# Patient Record
Sex: MALE | Race: WHITE | Employment: OTHER | URBAN - METROPOLITAN AREA
[De-identification: names, ages, dates, MRNs, and addresses within clinical notes are randomized per-mention and may not be internally consistent; named-entity substitution may affect disease eponyms.]

---

## 2017-01-24 ENCOUNTER — OFFICE VISIT (OUTPATIENT)
Dept: CARDIOLOGY | Age: 72
End: 2017-01-24

## 2017-01-24 VITALS
HEIGHT: 64 IN | RESPIRATION RATE: 12 BRPM | BODY MASS INDEX: 38.58 KG/M2 | WEIGHT: 226 LBS | HEART RATE: 56 BPM | SYSTOLIC BLOOD PRESSURE: 134 MMHG | DIASTOLIC BLOOD PRESSURE: 84 MMHG | OXYGEN SATURATION: 93 %

## 2017-01-24 DIAGNOSIS — I10 ESSENTIAL HYPERTENSION: ICD-10-CM

## 2017-01-24 DIAGNOSIS — E78.5 HYPERLIPIDEMIA, UNSPECIFIED HYPERLIPIDEMIA TYPE: Primary | ICD-10-CM

## 2017-01-24 DIAGNOSIS — N20.0 KIDNEY STONES: ICD-10-CM

## 2017-01-24 PROCEDURE — G8427 DOCREV CUR MEDS BY ELIG CLIN: HCPCS | Performed by: INTERNAL MEDICINE

## 2017-01-24 PROCEDURE — 1036F TOBACCO NON-USER: CPT | Performed by: INTERNAL MEDICINE

## 2017-01-24 PROCEDURE — G8419 CALC BMI OUT NRM PARAM NOF/U: HCPCS | Performed by: INTERNAL MEDICINE

## 2017-01-24 PROCEDURE — 99213 OFFICE O/P EST LOW 20 MIN: CPT | Performed by: INTERNAL MEDICINE

## 2017-01-24 PROCEDURE — G8484 FLU IMMUNIZE NO ADMIN: HCPCS | Performed by: INTERNAL MEDICINE

## 2017-01-24 PROCEDURE — 3017F COLORECTAL CA SCREEN DOC REV: CPT | Performed by: INTERNAL MEDICINE

## 2017-01-24 PROCEDURE — 1123F ACP DISCUSS/DSCN MKR DOCD: CPT | Performed by: INTERNAL MEDICINE

## 2017-01-24 PROCEDURE — 4040F PNEUMOC VAC/ADMIN/RCVD: CPT | Performed by: INTERNAL MEDICINE

## 2017-01-24 RX ORDER — IMIQUIMOD 12.5 MG/.25G
CREAM TOPICAL
Refills: 0 | COMMUNITY
Start: 2017-01-09 | End: 2017-09-05

## 2017-01-24 ASSESSMENT — ENCOUNTER SYMPTOMS
SHORTNESS OF BREATH: 0
APNEA: 0
COUGH: 0
CHEST TIGHTNESS: 0
COLOR CHANGE: 0

## 2017-03-08 ENCOUNTER — OFFICE VISIT (OUTPATIENT)
Dept: FAMILY MEDICINE CLINIC | Age: 72
End: 2017-03-08

## 2017-03-08 VITALS
OXYGEN SATURATION: 96 % | BODY MASS INDEX: 37.9 KG/M2 | SYSTOLIC BLOOD PRESSURE: 134 MMHG | HEART RATE: 53 BPM | WEIGHT: 222 LBS | HEIGHT: 64 IN | DIASTOLIC BLOOD PRESSURE: 80 MMHG

## 2017-03-08 DIAGNOSIS — N40.0 BENIGN NON-NODULAR PROSTATIC HYPERPLASIA WITHOUT LOWER URINARY TRACT SYMPTOMS: ICD-10-CM

## 2017-03-08 DIAGNOSIS — R06.09 DOE (DYSPNEA ON EXERTION): ICD-10-CM

## 2017-03-08 DIAGNOSIS — M19.90 OSTEOARTHRITIS, UNSPECIFIED OSTEOARTHRITIS TYPE, UNSPECIFIED SITE: ICD-10-CM

## 2017-03-08 DIAGNOSIS — K21.9 GASTROESOPHAGEAL REFLUX DISEASE WITHOUT ESOPHAGITIS: Primary | ICD-10-CM

## 2017-03-08 DIAGNOSIS — Z11.59 NEED FOR HEPATITIS C SCREENING TEST: ICD-10-CM

## 2017-03-08 DIAGNOSIS — G47.33 OSA (OBSTRUCTIVE SLEEP APNEA): ICD-10-CM

## 2017-03-08 DIAGNOSIS — E78.5 HYPERLIPIDEMIA, UNSPECIFIED HYPERLIPIDEMIA TYPE: ICD-10-CM

## 2017-03-08 DIAGNOSIS — I10 ESSENTIAL HYPERTENSION: ICD-10-CM

## 2017-03-08 DIAGNOSIS — R00.2 PALPITATIONS: ICD-10-CM

## 2017-03-08 DIAGNOSIS — C44.91 MALIGNANT BASAL CELL NEOPLASM OF SKIN: ICD-10-CM

## 2017-03-08 LAB
ALBUMIN SERPL-MCNC: 3.6 G/DL (ref 3.9–4.9)
ALP BLD-CCNC: 82 U/L (ref 35–104)
ALT SERPL-CCNC: 29 U/L (ref 0–41)
ANION GAP SERPL CALCULATED.3IONS-SCNC: 11 MEQ/L (ref 7–13)
AST SERPL-CCNC: 26 U/L (ref 0–40)
BILIRUB SERPL-MCNC: 0.4 MG/DL (ref 0–1.2)
BUN BLDV-MCNC: 16 MG/DL (ref 8–23)
CALCIUM SERPL-MCNC: 8.6 MG/DL (ref 8.6–10.2)
CHLORIDE BLD-SCNC: 105 MEQ/L (ref 98–107)
CHOLESTEROL, TOTAL: 110 MG/DL (ref 0–199)
CO2: 24 MEQ/L (ref 22–29)
CREAT SERPL-MCNC: 1.14 MG/DL (ref 0.7–1.2)
GFR AFRICAN AMERICAN: >60
GFR NON-AFRICAN AMERICAN: >60
GLOBULIN: 2.6 G/DL (ref 2.3–3.5)
GLUCOSE BLD-MCNC: 102 MG/DL (ref 74–109)
HCT VFR BLD CALC: 45.1 % (ref 42–52)
HDLC SERPL-MCNC: 38 MG/DL (ref 40–59)
HEMOGLOBIN: 15.2 G/DL (ref 14–18)
LDL CHOLESTEROL CALCULATED: 54 MG/DL (ref 0–129)
MCH RBC QN AUTO: 30.8 PG (ref 27–31.3)
MCHC RBC AUTO-ENTMCNC: 33.7 % (ref 33–37)
MCV RBC AUTO: 91.5 FL (ref 80–100)
PDW BLD-RTO: 13.4 % (ref 11.5–14.5)
PLATELET # BLD: 178 K/UL (ref 130–400)
POTASSIUM SERPL-SCNC: 4 MEQ/L (ref 3.5–5.1)
RBC # BLD: 4.93 M/UL (ref 4.7–6.1)
SODIUM BLD-SCNC: 140 MEQ/L (ref 132–144)
TOTAL PROTEIN: 6.2 G/DL (ref 6.4–8.1)
TRIGL SERPL-MCNC: 89 MG/DL (ref 0–200)
WBC # BLD: 6.8 K/UL (ref 4.8–10.8)

## 2017-03-08 PROCEDURE — 3017F COLORECTAL CA SCREEN DOC REV: CPT | Performed by: FAMILY MEDICINE

## 2017-03-08 PROCEDURE — G8427 DOCREV CUR MEDS BY ELIG CLIN: HCPCS | Performed by: FAMILY MEDICINE

## 2017-03-08 PROCEDURE — 1036F TOBACCO NON-USER: CPT | Performed by: FAMILY MEDICINE

## 2017-03-08 PROCEDURE — G8419 CALC BMI OUT NRM PARAM NOF/U: HCPCS | Performed by: FAMILY MEDICINE

## 2017-03-08 PROCEDURE — 99214 OFFICE O/P EST MOD 30 MIN: CPT | Performed by: FAMILY MEDICINE

## 2017-03-08 PROCEDURE — 4040F PNEUMOC VAC/ADMIN/RCVD: CPT | Performed by: FAMILY MEDICINE

## 2017-03-08 PROCEDURE — G8484 FLU IMMUNIZE NO ADMIN: HCPCS | Performed by: FAMILY MEDICINE

## 2017-03-08 PROCEDURE — 1123F ACP DISCUSS/DSCN MKR DOCD: CPT | Performed by: FAMILY MEDICINE

## 2017-03-11 LAB — HEPATITIS C ANTIBODY INTERPRETATION: NORMAL

## 2017-03-14 RX ORDER — ALLOPURINOL 100 MG/1
TABLET ORAL
Qty: 30 TABLET | Refills: 5 | Status: SHIPPED | OUTPATIENT
Start: 2017-03-14 | End: 2017-09-04 | Stop reason: SDUPTHER

## 2017-04-06 RX ORDER — FINASTERIDE 5 MG/1
TABLET, FILM COATED ORAL
Qty: 90 TABLET | Refills: 2 | Status: SHIPPED | OUTPATIENT
Start: 2017-04-06 | End: 2017-12-03 | Stop reason: SDUPTHER

## 2017-04-06 RX ORDER — TERAZOSIN 10 MG/1
CAPSULE ORAL
Qty: 90 CAPSULE | Refills: 2 | Status: SHIPPED | OUTPATIENT
Start: 2017-04-06 | End: 2017-12-03 | Stop reason: SDUPTHER

## 2017-06-12 RX ORDER — METOPROLOL TARTRATE 50 MG/1
TABLET, FILM COATED ORAL
Qty: 60 TABLET | Refills: 5 | Status: SHIPPED | OUTPATIENT
Start: 2017-06-12 | End: 2017-12-03 | Stop reason: SDUPTHER

## 2017-06-12 RX ORDER — HYDROCHLOROTHIAZIDE 25 MG/1
TABLET ORAL
Qty: 30 TABLET | Refills: 5 | Status: SHIPPED | OUTPATIENT
Start: 2017-06-12 | End: 2017-12-03 | Stop reason: SDUPTHER

## 2017-07-10 RX ORDER — EZETIMIBE AND SIMVASTATIN 10; 20 MG/1; MG/1
TABLET ORAL
Qty: 30 TABLET | Refills: 5 | Status: CANCELLED | OUTPATIENT
Start: 2017-07-10

## 2017-07-10 RX ORDER — EZETIMIBE AND SIMVASTATIN 10; 20 MG/1; MG/1
TABLET ORAL
Qty: 30 TABLET | Refills: 5 | Status: SHIPPED | OUTPATIENT
Start: 2017-07-10 | End: 2017-12-03 | Stop reason: SDUPTHER

## 2017-07-25 ENCOUNTER — OFFICE VISIT (OUTPATIENT)
Dept: CARDIOLOGY | Age: 72
End: 2017-07-25

## 2017-07-25 VITALS
HEIGHT: 64 IN | OXYGEN SATURATION: 98 % | DIASTOLIC BLOOD PRESSURE: 82 MMHG | RESPIRATION RATE: 12 BRPM | SYSTOLIC BLOOD PRESSURE: 138 MMHG | TEMPERATURE: 96.9 F | BODY MASS INDEX: 39.44 KG/M2 | WEIGHT: 231 LBS | HEART RATE: 56 BPM

## 2017-07-25 DIAGNOSIS — G47.33 OSA (OBSTRUCTIVE SLEEP APNEA): ICD-10-CM

## 2017-07-25 DIAGNOSIS — E78.5 HYPERLIPIDEMIA, UNSPECIFIED HYPERLIPIDEMIA TYPE: ICD-10-CM

## 2017-07-25 DIAGNOSIS — N40.0 BENIGN NON-NODULAR PROSTATIC HYPERPLASIA WITHOUT LOWER URINARY TRACT SYMPTOMS: ICD-10-CM

## 2017-07-25 DIAGNOSIS — I10 ESSENTIAL HYPERTENSION: ICD-10-CM

## 2017-07-25 DIAGNOSIS — K21.9 GASTROESOPHAGEAL REFLUX DISEASE WITHOUT ESOPHAGITIS: Primary | ICD-10-CM

## 2017-07-25 PROCEDURE — 1123F ACP DISCUSS/DSCN MKR DOCD: CPT | Performed by: INTERNAL MEDICINE

## 2017-07-25 PROCEDURE — G8417 CALC BMI ABV UP PARAM F/U: HCPCS | Performed by: INTERNAL MEDICINE

## 2017-07-25 PROCEDURE — G8428 CUR MEDS NOT DOCUMENT: HCPCS | Performed by: INTERNAL MEDICINE

## 2017-07-25 PROCEDURE — 3017F COLORECTAL CA SCREEN DOC REV: CPT | Performed by: INTERNAL MEDICINE

## 2017-07-25 PROCEDURE — 99213 OFFICE O/P EST LOW 20 MIN: CPT | Performed by: INTERNAL MEDICINE

## 2017-07-25 PROCEDURE — 1036F TOBACCO NON-USER: CPT | Performed by: INTERNAL MEDICINE

## 2017-07-25 PROCEDURE — 4040F PNEUMOC VAC/ADMIN/RCVD: CPT | Performed by: INTERNAL MEDICINE

## 2017-07-25 ASSESSMENT — ENCOUNTER SYMPTOMS
CHEST TIGHTNESS: 0
APNEA: 0
COUGH: 0
SHORTNESS OF BREATH: 0
COLOR CHANGE: 0

## 2017-09-05 ENCOUNTER — OFFICE VISIT (OUTPATIENT)
Dept: FAMILY MEDICINE CLINIC | Age: 72
End: 2017-09-05

## 2017-09-05 VITALS
BODY MASS INDEX: 39.34 KG/M2 | SYSTOLIC BLOOD PRESSURE: 122 MMHG | DIASTOLIC BLOOD PRESSURE: 80 MMHG | WEIGHT: 230.4 LBS | OXYGEN SATURATION: 98 % | HEIGHT: 64 IN | HEART RATE: 51 BPM

## 2017-09-05 DIAGNOSIS — G47.33 OSA (OBSTRUCTIVE SLEEP APNEA): ICD-10-CM

## 2017-09-05 DIAGNOSIS — Z23 NEEDS FLU SHOT: ICD-10-CM

## 2017-09-05 DIAGNOSIS — E78.5 HYPERLIPIDEMIA, UNSPECIFIED HYPERLIPIDEMIA TYPE: ICD-10-CM

## 2017-09-05 DIAGNOSIS — N40.0 BENIGN NON-NODULAR PROSTATIC HYPERPLASIA WITHOUT LOWER URINARY TRACT SYMPTOMS: ICD-10-CM

## 2017-09-05 DIAGNOSIS — I10 ESSENTIAL HYPERTENSION: ICD-10-CM

## 2017-09-05 DIAGNOSIS — K21.9 GASTROESOPHAGEAL REFLUX DISEASE WITHOUT ESOPHAGITIS: Primary | ICD-10-CM

## 2017-09-05 DIAGNOSIS — E79.0 HYPERURICEMIA: ICD-10-CM

## 2017-09-05 LAB
ALBUMIN SERPL-MCNC: 3.9 G/DL (ref 3.9–4.9)
ALP BLD-CCNC: 72 U/L (ref 35–104)
ALT SERPL-CCNC: 23 U/L (ref 0–41)
ANION GAP SERPL CALCULATED.3IONS-SCNC: 15 MEQ/L (ref 7–13)
AST SERPL-CCNC: 26 U/L (ref 0–40)
BILIRUB SERPL-MCNC: 0.4 MG/DL (ref 0–1.2)
BUN BLDV-MCNC: 18 MG/DL (ref 8–23)
CALCIUM SERPL-MCNC: 8.5 MG/DL (ref 8.6–10.2)
CHLORIDE BLD-SCNC: 103 MEQ/L (ref 98–107)
CHOLESTEROL, TOTAL: 111 MG/DL (ref 0–199)
CO2: 24 MEQ/L (ref 22–29)
CREAT SERPL-MCNC: 1 MG/DL (ref 0.7–1.2)
GFR AFRICAN AMERICAN: >60
GFR NON-AFRICAN AMERICAN: >60
GLOBULIN: 2.7 G/DL (ref 2.3–3.5)
GLUCOSE BLD-MCNC: 115 MG/DL (ref 74–109)
HCT VFR BLD CALC: 45.7 % (ref 42–52)
HDLC SERPL-MCNC: 41 MG/DL (ref 40–59)
HEMOGLOBIN: 15.4 G/DL (ref 14–18)
LDL CHOLESTEROL CALCULATED: 43 MG/DL (ref 0–129)
MCH RBC QN AUTO: 30.9 PG (ref 27–31.3)
MCHC RBC AUTO-ENTMCNC: 33.7 % (ref 33–37)
MCV RBC AUTO: 91.8 FL (ref 80–100)
PDW BLD-RTO: 13.7 % (ref 11.5–14.5)
PLATELET # BLD: 157 K/UL (ref 130–400)
POTASSIUM SERPL-SCNC: 3.7 MEQ/L (ref 3.5–5.1)
RBC # BLD: 4.98 M/UL (ref 4.7–6.1)
SODIUM BLD-SCNC: 142 MEQ/L (ref 132–144)
TOTAL PROTEIN: 6.6 G/DL (ref 6.4–8.1)
TRIGL SERPL-MCNC: 135 MG/DL (ref 0–200)
URIC ACID, SERUM: 6.7 MG/DL (ref 3.4–7)
WBC # BLD: 7.4 K/UL (ref 4.8–10.8)

## 2017-09-05 PROCEDURE — 3017F COLORECTAL CA SCREEN DOC REV: CPT | Performed by: FAMILY MEDICINE

## 2017-09-05 PROCEDURE — G8427 DOCREV CUR MEDS BY ELIG CLIN: HCPCS | Performed by: FAMILY MEDICINE

## 2017-09-05 PROCEDURE — G0008 ADMIN INFLUENZA VIRUS VAC: HCPCS | Performed by: FAMILY MEDICINE

## 2017-09-05 PROCEDURE — 1036F TOBACCO NON-USER: CPT | Performed by: FAMILY MEDICINE

## 2017-09-05 PROCEDURE — 90662 IIV NO PRSV INCREASED AG IM: CPT | Performed by: FAMILY MEDICINE

## 2017-09-05 PROCEDURE — G8417 CALC BMI ABV UP PARAM F/U: HCPCS | Performed by: FAMILY MEDICINE

## 2017-09-05 PROCEDURE — 4040F PNEUMOC VAC/ADMIN/RCVD: CPT | Performed by: FAMILY MEDICINE

## 2017-09-05 PROCEDURE — 99213 OFFICE O/P EST LOW 20 MIN: CPT | Performed by: FAMILY MEDICINE

## 2017-09-05 PROCEDURE — 1123F ACP DISCUSS/DSCN MKR DOCD: CPT | Performed by: FAMILY MEDICINE

## 2017-09-05 RX ORDER — MULTIVIT-MIN/IRON/FOLIC ACID/K 18-600-40
CAPSULE ORAL
COMMUNITY
End: 2022-09-12

## 2017-09-05 RX ORDER — ALLOPURINOL 100 MG/1
TABLET ORAL
Qty: 30 TABLET | Refills: 5 | Status: SHIPPED | OUTPATIENT
Start: 2017-09-05 | End: 2018-03-02 | Stop reason: SDUPTHER

## 2017-09-05 ASSESSMENT — PATIENT HEALTH QUESTIONNAIRE - PHQ9
2. FEELING DOWN, DEPRESSED OR HOPELESS: 0
1. LITTLE INTEREST OR PLEASURE IN DOING THINGS: 0
SUM OF ALL RESPONSES TO PHQ QUESTIONS 1-9: 0
SUM OF ALL RESPONSES TO PHQ9 QUESTIONS 1 & 2: 0

## 2017-12-04 RX ORDER — TERAZOSIN 10 MG/1
CAPSULE ORAL
Qty: 90 CAPSULE | Refills: 2 | Status: SHIPPED | OUTPATIENT
Start: 2017-12-04 | End: 2018-08-29 | Stop reason: SDUPTHER

## 2017-12-04 RX ORDER — EZETIMIBE AND SIMVASTATIN 10; 20 MG/1; MG/1
TABLET ORAL
Qty: 30 TABLET | Refills: 5 | Status: SHIPPED | OUTPATIENT
Start: 2017-12-04 | End: 2018-05-31 | Stop reason: SDUPTHER

## 2017-12-04 RX ORDER — HYDROCHLOROTHIAZIDE 25 MG/1
TABLET ORAL
Qty: 90 TABLET | Refills: 2 | Status: SHIPPED | OUTPATIENT
Start: 2017-12-04 | End: 2018-08-29 | Stop reason: SDUPTHER

## 2017-12-04 RX ORDER — FINASTERIDE 5 MG/1
TABLET, FILM COATED ORAL
Qty: 90 TABLET | Refills: 2 | Status: SHIPPED | OUTPATIENT
Start: 2017-12-04 | End: 2018-08-29 | Stop reason: SDUPTHER

## 2017-12-04 RX ORDER — METOPROLOL TARTRATE 50 MG/1
TABLET, FILM COATED ORAL
Qty: 180 TABLET | Refills: 2 | Status: SHIPPED | OUTPATIENT
Start: 2017-12-04 | End: 2018-08-29 | Stop reason: SDUPTHER

## 2018-01-24 ENCOUNTER — OFFICE VISIT (OUTPATIENT)
Dept: CARDIOLOGY CLINIC | Age: 73
End: 2018-01-24
Payer: MEDICARE

## 2018-01-24 VITALS
RESPIRATION RATE: 16 BRPM | WEIGHT: 226 LBS | BODY MASS INDEX: 38.79 KG/M2 | DIASTOLIC BLOOD PRESSURE: 64 MMHG | SYSTOLIC BLOOD PRESSURE: 112 MMHG | OXYGEN SATURATION: 98 % | HEART RATE: 62 BPM

## 2018-01-24 DIAGNOSIS — R00.2 PALPITATIONS: ICD-10-CM

## 2018-01-24 DIAGNOSIS — K21.9 GASTROESOPHAGEAL REFLUX DISEASE WITHOUT ESOPHAGITIS: ICD-10-CM

## 2018-01-24 DIAGNOSIS — G47.33 OSA (OBSTRUCTIVE SLEEP APNEA): ICD-10-CM

## 2018-01-24 DIAGNOSIS — I10 ESSENTIAL HYPERTENSION: Primary | ICD-10-CM

## 2018-01-24 DIAGNOSIS — R06.09 DOE (DYSPNEA ON EXERTION): ICD-10-CM

## 2018-01-24 PROCEDURE — G8484 FLU IMMUNIZE NO ADMIN: HCPCS | Performed by: INTERNAL MEDICINE

## 2018-01-24 PROCEDURE — G8427 DOCREV CUR MEDS BY ELIG CLIN: HCPCS | Performed by: INTERNAL MEDICINE

## 2018-01-24 PROCEDURE — 3017F COLORECTAL CA SCREEN DOC REV: CPT | Performed by: INTERNAL MEDICINE

## 2018-01-24 PROCEDURE — G8417 CALC BMI ABV UP PARAM F/U: HCPCS | Performed by: INTERNAL MEDICINE

## 2018-01-24 PROCEDURE — 99214 OFFICE O/P EST MOD 30 MIN: CPT | Performed by: INTERNAL MEDICINE

## 2018-01-24 PROCEDURE — 1123F ACP DISCUSS/DSCN MKR DOCD: CPT | Performed by: INTERNAL MEDICINE

## 2018-01-24 PROCEDURE — 4040F PNEUMOC VAC/ADMIN/RCVD: CPT | Performed by: INTERNAL MEDICINE

## 2018-01-24 PROCEDURE — 1036F TOBACCO NON-USER: CPT | Performed by: INTERNAL MEDICINE

## 2018-01-24 ASSESSMENT — ENCOUNTER SYMPTOMS
SHORTNESS OF BREATH: 0
CHEST TIGHTNESS: 0
APNEA: 0

## 2018-01-24 NOTE — PROGRESS NOTES
or call office sooner if needed.  > If refills are needed after appointment contact pharmacy.         Electronically signed by: Bryan Hernandez MD  1/24/2018 1:04 PM

## 2018-03-05 ENCOUNTER — OFFICE VISIT (OUTPATIENT)
Dept: FAMILY MEDICINE CLINIC | Age: 73
End: 2018-03-05
Payer: MEDICARE

## 2018-03-05 VITALS
BODY MASS INDEX: 38.48 KG/M2 | HEIGHT: 64 IN | SYSTOLIC BLOOD PRESSURE: 122 MMHG | DIASTOLIC BLOOD PRESSURE: 78 MMHG | HEART RATE: 50 BPM | WEIGHT: 225.4 LBS | OXYGEN SATURATION: 97 %

## 2018-03-05 DIAGNOSIS — K21.9 GASTROESOPHAGEAL REFLUX DISEASE WITHOUT ESOPHAGITIS: Primary | ICD-10-CM

## 2018-03-05 DIAGNOSIS — G47.33 OSA (OBSTRUCTIVE SLEEP APNEA): ICD-10-CM

## 2018-03-05 DIAGNOSIS — E78.5 HYPERLIPIDEMIA, UNSPECIFIED HYPERLIPIDEMIA TYPE: ICD-10-CM

## 2018-03-05 DIAGNOSIS — E79.0 HYPERURICEMIA: ICD-10-CM

## 2018-03-05 DIAGNOSIS — R73.9 HYPERGLYCEMIA: ICD-10-CM

## 2018-03-05 DIAGNOSIS — I10 ESSENTIAL HYPERTENSION: ICD-10-CM

## 2018-03-05 LAB
ALBUMIN SERPL-MCNC: 4.2 G/DL (ref 3.9–4.9)
ALP BLD-CCNC: 74 U/L (ref 35–104)
ALT SERPL-CCNC: 35 U/L (ref 0–41)
ANION GAP SERPL CALCULATED.3IONS-SCNC: 17 MEQ/L (ref 7–13)
AST SERPL-CCNC: 28 U/L (ref 0–40)
BILIRUB SERPL-MCNC: 0.3 MG/DL (ref 0–1.2)
BUN BLDV-MCNC: 20 MG/DL (ref 8–23)
CALCIUM SERPL-MCNC: 9.1 MG/DL (ref 8.6–10.2)
CHLORIDE BLD-SCNC: 100 MEQ/L (ref 98–107)
CHOLESTEROL, TOTAL: 121 MG/DL (ref 0–199)
CO2: 24 MEQ/L (ref 22–29)
CREAT SERPL-MCNC: 1.09 MG/DL (ref 0.7–1.2)
GFR AFRICAN AMERICAN: >60
GFR NON-AFRICAN AMERICAN: >60
GLOBULIN: 2.8 G/DL (ref 2.3–3.5)
GLUCOSE BLD-MCNC: 105 MG/DL (ref 74–109)
HBA1C MFR BLD: 6 % (ref 4.8–5.9)
HCT VFR BLD CALC: 45.3 % (ref 42–52)
HDLC SERPL-MCNC: 43 MG/DL (ref 40–59)
HEMOGLOBIN: 15.4 G/DL (ref 14–18)
LDL CHOLESTEROL CALCULATED: 48 MG/DL (ref 0–129)
MCH RBC QN AUTO: 31.5 PG (ref 27–31.3)
MCHC RBC AUTO-ENTMCNC: 34 % (ref 33–37)
MCV RBC AUTO: 92.6 FL (ref 80–100)
PDW BLD-RTO: 13.9 % (ref 11.5–14.5)
PLATELET # BLD: 165 K/UL (ref 130–400)
POTASSIUM SERPL-SCNC: 3.9 MEQ/L (ref 3.5–5.1)
RBC # BLD: 4.89 M/UL (ref 4.7–6.1)
SODIUM BLD-SCNC: 141 MEQ/L (ref 132–144)
TOTAL PROTEIN: 7 G/DL (ref 6.4–8.1)
TRIGL SERPL-MCNC: 148 MG/DL (ref 0–200)
URIC ACID, SERUM: 6.9 MG/DL (ref 3.4–7)
WBC # BLD: 7.4 K/UL (ref 4.8–10.8)

## 2018-03-05 PROCEDURE — 1123F ACP DISCUSS/DSCN MKR DOCD: CPT | Performed by: FAMILY MEDICINE

## 2018-03-05 PROCEDURE — G8484 FLU IMMUNIZE NO ADMIN: HCPCS | Performed by: FAMILY MEDICINE

## 2018-03-05 PROCEDURE — 3017F COLORECTAL CA SCREEN DOC REV: CPT | Performed by: FAMILY MEDICINE

## 2018-03-05 PROCEDURE — 99214 OFFICE O/P EST MOD 30 MIN: CPT | Performed by: FAMILY MEDICINE

## 2018-03-05 PROCEDURE — 1036F TOBACCO NON-USER: CPT | Performed by: FAMILY MEDICINE

## 2018-03-05 PROCEDURE — 4040F PNEUMOC VAC/ADMIN/RCVD: CPT | Performed by: FAMILY MEDICINE

## 2018-03-05 PROCEDURE — G8417 CALC BMI ABV UP PARAM F/U: HCPCS | Performed by: FAMILY MEDICINE

## 2018-03-05 PROCEDURE — G8427 DOCREV CUR MEDS BY ELIG CLIN: HCPCS | Performed by: FAMILY MEDICINE

## 2018-03-05 RX ORDER — ALLOPURINOL 100 MG/1
TABLET ORAL
Qty: 30 TABLET | Refills: 5 | Status: SHIPPED | OUTPATIENT
Start: 2018-03-05 | End: 2019-01-23 | Stop reason: SDUPTHER

## 2018-03-05 NOTE — PROGRESS NOTES
122/78 (Site: Right Arm)   Pulse 50   Ht 5' 4\" (1.626 m)   Wt 225 lb 6.4 oz (102.2 kg)   SpO2 97%   BMI 38.69 kg/m²     Gen: Well, NAD, Alert, Oriented x 3   HEENT: EOMI, eyes clear, MMM  Skin: without rash or jaundice  Neck: no significant lymphadenopathy or thyromegaly  Lungs: CTA B w/out Rales/Wheezes/Rhonchi, Good respiratory effort   Heart: RRR, S1S2, w/out M/R/G, non-displaced PMI   Abdomen: Soft NT/ND, w/out R/G, w/ +BSx4 , rectus diastasis  Ext: No C/C/E Bilaterally. Neuro: Neurovascularly intact w/ Sensory/Motor intact UE/LE Bilaterally. Rectal - deferred - sees urology annually  Psych:generally euthymic    Lab Results   Component Value Date    WBC 7.4 09/05/2017    HGB 15.4 09/05/2017    HCT 45.7 09/05/2017     09/05/2017    CHOL 111 09/05/2017    TRIG 135 09/05/2017    HDL 41 09/05/2017    ALT 23 09/05/2017    AST 26 09/05/2017     09/05/2017    K 3.7 09/05/2017     09/05/2017    CREATININE 1.00 09/05/2017    BUN 18 09/05/2017    CO2 24 09/05/2017    TSH 1.800 01/14/2015    PSA 1.0 04/01/2012    LABA1C 5.9 09/03/2013           A&P  1. Gastroesophageal reflux disease without esophagitis     2. Hyperlipidemia, unspecified hyperlipidemia type  Lipid Panel   3. Essential hypertension  Comprehensive Metabolic Panel    CBC   4. JOSE (obstructive sleep apnea)     5. Hyperglycemia  Hemoglobin A1C   6. Hyperuricemia  Uric Acid       Reviewed all chronic issues   Labs today    Chronic issues are generally well-controlled. Continue present regimen of medications    Patient Counseling:  --Nutrition: Stressed importance of moderation in sodium/caffeine intake, saturated fat and cholesterol, caloric balance, sufficient intake of fresh fruits, vegetables, fiber-  --Exercise: Stressed the importance of regular exercise.  he does useStairmaster[facility]treadmill [facility] the [facility]  --Dental health: Discussed importance of regulardental visits  --Immunizations reviewed - flu shot  --Discussed benefits of screening colonoscopy - last 9/20/13  --Discussed pros and cons of prostate CA screening - sees urology, will have PSA and rectal done there        Continue follow ups q6 mos.     Annual follow up with urology    He follows with derm    Labs as ordered    Karina Mccormack MD

## 2018-03-29 RX ORDER — ALLOPURINOL 100 MG/1
TABLET ORAL
Qty: 30 TABLET | Refills: 5 | Status: SHIPPED | OUTPATIENT
Start: 2018-03-29 | End: 2018-09-05 | Stop reason: SDUPTHER

## 2018-06-01 RX ORDER — EZETIMIBE AND SIMVASTATIN 10; 20 MG/1; MG/1
TABLET ORAL
Qty: 30 TABLET | Refills: 5 | Status: SHIPPED | OUTPATIENT
Start: 2018-06-01 | End: 2018-11-28 | Stop reason: SDUPTHER

## 2018-07-25 ENCOUNTER — OFFICE VISIT (OUTPATIENT)
Dept: CARDIOLOGY CLINIC | Age: 73
End: 2018-07-25
Payer: MEDICARE

## 2018-07-25 VITALS
DIASTOLIC BLOOD PRESSURE: 82 MMHG | TEMPERATURE: 97.5 F | HEIGHT: 64 IN | HEART RATE: 61 BPM | BODY MASS INDEX: 38.31 KG/M2 | OXYGEN SATURATION: 95 % | SYSTOLIC BLOOD PRESSURE: 122 MMHG | WEIGHT: 224.4 LBS | RESPIRATION RATE: 18 BRPM

## 2018-07-25 DIAGNOSIS — I10 ESSENTIAL HYPERTENSION: Primary | ICD-10-CM

## 2018-07-25 DIAGNOSIS — R00.2 PALPITATIONS: ICD-10-CM

## 2018-07-25 DIAGNOSIS — R06.09 DOE (DYSPNEA ON EXERTION): ICD-10-CM

## 2018-07-25 PROCEDURE — 1123F ACP DISCUSS/DSCN MKR DOCD: CPT | Performed by: INTERNAL MEDICINE

## 2018-07-25 PROCEDURE — 3017F COLORECTAL CA SCREEN DOC REV: CPT | Performed by: INTERNAL MEDICINE

## 2018-07-25 PROCEDURE — 4040F PNEUMOC VAC/ADMIN/RCVD: CPT | Performed by: INTERNAL MEDICINE

## 2018-07-25 PROCEDURE — 99213 OFFICE O/P EST LOW 20 MIN: CPT | Performed by: INTERNAL MEDICINE

## 2018-07-25 PROCEDURE — G8427 DOCREV CUR MEDS BY ELIG CLIN: HCPCS | Performed by: INTERNAL MEDICINE

## 2018-07-25 PROCEDURE — G8417 CALC BMI ABV UP PARAM F/U: HCPCS | Performed by: INTERNAL MEDICINE

## 2018-07-25 PROCEDURE — 1101F PT FALLS ASSESS-DOCD LE1/YR: CPT | Performed by: INTERNAL MEDICINE

## 2018-07-25 PROCEDURE — 1036F TOBACCO NON-USER: CPT | Performed by: INTERNAL MEDICINE

## 2018-07-25 ASSESSMENT — ENCOUNTER SYMPTOMS
ABDOMINAL DISTENTION: 0
VOMITING: 0
APNEA: 0
NAUSEA: 0
CHEST TIGHTNESS: 0
BLOOD IN STOOL: 0
COLOR CHANGE: 0
SHORTNESS OF BREATH: 0
DIARRHEA: 0

## 2018-07-25 NOTE — PROGRESS NOTES
Subsequent Progress Note  Patient: Pablo Raza  YOB: 1945  MRN: [id number]    Chief Complaint:  Chief Complaint   Patient presents with    6 Month Follow-Up    Hypertension    Palpitations         Subjective/HPI:  7/25/18: Patient presents today for follow-up of hypertension hyperlipidemia. Retired from [employer]. Has kidney stones that is stable. Still fairly active. No chest pain angina. See me in 6 months     1/24/2018: Patient presents today for follow-up of hypertension hyperlipidemia. He is retired from [employer]. Has lost about 7 pounds. Denies any chest pain congestive heart failure. Has a history of kidney stones that are stable. See me in 6 months.        7/25/17: Patient presents today for follow-up of chest pain. Has hypertension and hyperlipidemia that stable also has a history of kidney stone. He kayaks near his home. Fairly active. No chest pain congestive heart failure symptoms or syncope. See me in 6 months in the [place] office.     1/24/17: For follow-up of chest pain. Doing well otherwise. Has hypertension and hyperlipidemia and history of kidney stones. He will call [facility] in physical therapy. He is fairly active doing Summer months. He will see the yard and does mowing and landscaping.     7/26/16: For fu of CP. Resolved. Retired from [employer]. Has HTN,HLP and obesity. H/O kidney stones. See in [facility]      1/26/16: No angina,CHF,syncopy or claudication. See in 6M.         Past Medical History:   Diagnosis Date    Basal cell cancer     multiple,  follows with Dr. Joyce Hanson BPH (benign prostatic hypertrophy)     Cataract     ophthalmologist - Dr. Omar Newman [facility] [place]    Chest pain     Chronic kidney disease     ARRIAGA (dyspnea on exertion)     GERD (gastroesophageal reflux disease)     Hyperlipidemia     Hypertension     Kidney stones     HX of    Normal cardiac stress test 1/2015    JOSE (obstructive sleep apnea)     Osteoarthritis     Palpitation     Rectus diastasis 9/10/2014       Past Surgical History:   Procedure Laterality Date    COLONOSCOPY  2005    COLONOSCOPY  9/20/13    DR. [facility] SURGERY  2012    cataracts    KNEE SURGERY      Left TKR    NOSE SURGERY      TONSILLECTOMY AND ADENOIDECTOMY         Family History   Problem Relation Age of Onset    Family history unknown: Yes       Social History     Social History    Marital status:      Spouse name: N/A    Number of children: N/A    Years of education: N/A     Social History Main Topics    Smoking status: Never Smoker    Smokeless tobacco: Never Used    Alcohol use No    Drug use: No    Sexual activity: Yes     Partners: Female     Other Topics Concern    None     Social History Narrative    None       No Known Allergies    Current Outpatient Prescriptions   Medication Sig Dispense Refill    ezetimibe-simvastatin (VYTORIN) 10-20 MG per tablet TAKE 1 TABLET BY MOUTH NIGHTLY 30 tablet 5    allopurinol (ZYLOPRIM) 100 MG tablet Take 1 tablet by mouth daily. 30 tablet 5    allopurinol (ZYLOPRIM) 100 MG tablet Take 1 tablet by mouth daily. 30 tablet 5    metoprolol tartrate (LOPRESSOR) 50 MG tablet TAKE 1 TABLET BY MOUTH 2 TIMES DAILY. 180 tablet 2    terazosin (HYTRIN) 10 MG capsule Take 1 Capsule by mouth at bedtime. 90 capsule 2    finasteride (PROSCAR) 5 MG tablet Take 1 Tablet by mouth daily. 90 tablet 2    hydrochlorothiazide (HYDRODIURIL) 25 MG tablet TAKE 1 TABLET BY MOUTH DAILY. 90 tablet 2    Cholecalciferol (VITAMIN D) 2000 units CAPS capsule Take by mouth      esomeprazole (NEXIUM) 20 MG delayed release capsule Take 1 capsule by mouth every morning (before breakfast) 30 capsule 5    Coenzyme Q10 (CO Q 10 PO) Take  by mouth.  aspirin 325 MG tablet Take 325 mg by mouth daily. No current facility-administered medications for this visit.         Review of Systems:   Review of Systems   Constitutional: Negative for appetite change, diaphoresis and fatigue. HENT: Negative for nosebleeds. Respiratory: Negative for apnea, chest tightness and shortness of breath. Cardiovascular: Negative for chest pain, palpitations and leg swelling. Gastrointestinal: Negative for abdominal distention, blood in stool, diarrhea, nausea and vomiting. Musculoskeletal: Negative for myalgias, neck pain and neck stiffness. Skin: Negative for color change, pallor, rash and wound. Neurological: Negative for dizziness, seizures, syncope, weakness, light-headedness, numbness and headaches. Hematological: Does not bruise/bleed easily. Psychiatric/Behavioral: Negative for agitation, behavioral problems and confusion. The patient is not nervous/anxious and is not hyperactive. All other systems reviewed and are negative. Review of System is negative except for as mentioned above. Physical Examination:    /82 (Site: Right Arm, Position: Sitting, Cuff Size: Medium Adult)   Pulse 61   Temp 97.5 °F (36.4 °C) (Temporal)   Resp 18   Ht 5' 4\" (1.626 m)   Wt 224 lb 6.4 oz (101.8 kg)   SpO2 95%   BMI 38.52 kg/m²    Physical Exam   Constitutional: He appears healthy. HENT:   Nose: Nose normal.   Mouth/Throat: Dentition is normal. Oropharynx is clear. Eyes: Pupils are equal, round, and reactive to light. Neck: Normal range of motion. Cardiovascular: Normal rate, regular rhythm, S1 normal, S2 normal, normal heart sounds, intact distal pulses and normal pulses. No extrasystoles are present. Exam reveals no gallop. No murmur heard. Pulmonary/Chest: Effort normal and breath sounds normal. He has no wheezes. He has no rales. He exhibits no tenderness. Abdominal: Soft. Bowel sounds are normal. He exhibits no distension and no mass. There is no splenomegaly or hepatomegaly. There is no tenderness. Musculoskeletal: Normal range of motion. He exhibits no edema, tenderness or deformity.    Neurological: He is alert and oriented to person, place, and time. He has normal motor skills and normal reflexes. Gait normal.   Skin: Skin is warm and dry.        LABS:  CBC:   Lab Results   Component Value Date    WBC 7.4 03/05/2018    RBC 4.89 03/05/2018    RBC 4.81 03/02/2012    HGB 15.4 03/05/2018    HCT 45.3 03/05/2018    MCV 92.6 03/05/2018    MCH 31.5 03/05/2018    MCHC 34.0 03/05/2018    RDW 13.9 03/05/2018     03/05/2018    MPV 10.0 09/10/2015     Lipids:  Lab Results   Component Value Date    CHOL 121 03/05/2018    CHOL 111 09/05/2017    CHOL 110 03/08/2017     Lab Results   Component Value Date    TRIG 148 03/05/2018    TRIG 135 09/05/2017    TRIG 89 03/08/2017     Lab Results   Component Value Date    HDL 43 03/05/2018    HDL 41 09/05/2017    HDL 38 (L) 03/08/2017     Lab Results   Component Value Date    LDLCALC 48 03/05/2018    LDLCALC 43 09/05/2017    LDLCALC 54 03/08/2017     No results found for: LABVLDL, VLDL  Lab Results   Component Value Date    CHOLHDLRATIO 2.2 03/06/2013    CHOLHDLRATIO 2.9 03/02/2012     CMP:    Lab Results   Component Value Date     03/05/2018    K 3.9 03/05/2018     03/05/2018    CO2 24 03/05/2018    BUN 20 03/05/2018    CREATININE 1.09 03/05/2018    GFRAA >60.0 03/05/2018    LABGLOM >60.0 03/05/2018    GLUCOSE 105 03/05/2018    GLUCOSE 121 03/02/2012    PROT 7.0 03/05/2018    LABALBU 4.2 03/05/2018    LABALBU 3.8 03/02/2012    CALCIUM 9.1 03/05/2018    BILITOT 0.3 03/05/2018    ALKPHOS 74 03/05/2018    AST 28 03/05/2018    ALT 35 03/05/2018     BMP:    Lab Results   Component Value Date     03/05/2018    K 3.9 03/05/2018     03/05/2018    CO2 24 03/05/2018    BUN 20 03/05/2018    LABALBU 4.2 03/05/2018    LABALBU 3.8 03/02/2012    CREATININE 1.09 03/05/2018    CALCIUM 9.1 03/05/2018    GFRAA >60.0 03/05/2018    LABGLOM >60.0 03/05/2018    GLUCOSE 105 03/05/2018    GLUCOSE 121 03/02/2012     Magnesium:  No results found for: MG  TSH:  Lab Results   Component Value Date    TSH 1.800 01/14/2015

## 2018-08-30 RX ORDER — HYDROCHLOROTHIAZIDE 25 MG/1
TABLET ORAL
Qty: 90 TABLET | Refills: 2 | Status: SHIPPED | OUTPATIENT
Start: 2018-08-30 | End: 2019-05-19 | Stop reason: SDUPTHER

## 2018-08-30 RX ORDER — FINASTERIDE 5 MG/1
TABLET, FILM COATED ORAL
Qty: 90 TABLET | Refills: 2 | Status: SHIPPED | OUTPATIENT
Start: 2018-08-30 | End: 2019-05-19 | Stop reason: SDUPTHER

## 2018-08-30 RX ORDER — METOPROLOL TARTRATE 50 MG/1
TABLET, FILM COATED ORAL
Qty: 180 TABLET | Refills: 2 | Status: SHIPPED | OUTPATIENT
Start: 2018-08-30 | End: 2019-05-19 | Stop reason: SDUPTHER

## 2018-08-30 RX ORDER — TERAZOSIN 10 MG/1
CAPSULE ORAL
Qty: 90 CAPSULE | Refills: 2 | Status: SHIPPED | OUTPATIENT
Start: 2018-08-30 | End: 2019-05-19 | Stop reason: SDUPTHER

## 2018-09-05 ENCOUNTER — OFFICE VISIT (OUTPATIENT)
Dept: FAMILY MEDICINE CLINIC | Age: 73
End: 2018-09-05
Payer: MEDICARE

## 2018-09-05 VITALS
SYSTOLIC BLOOD PRESSURE: 130 MMHG | WEIGHT: 229 LBS | BODY MASS INDEX: 39.09 KG/M2 | OXYGEN SATURATION: 97 % | HEIGHT: 64 IN | HEART RATE: 62 BPM | DIASTOLIC BLOOD PRESSURE: 86 MMHG

## 2018-09-05 DIAGNOSIS — R73.9 HYPERGLYCEMIA: ICD-10-CM

## 2018-09-05 DIAGNOSIS — I10 ESSENTIAL HYPERTENSION: ICD-10-CM

## 2018-09-05 DIAGNOSIS — K21.9 GASTROESOPHAGEAL REFLUX DISEASE WITHOUT ESOPHAGITIS: Primary | ICD-10-CM

## 2018-09-05 DIAGNOSIS — E78.5 HYPERLIPIDEMIA, UNSPECIFIED HYPERLIPIDEMIA TYPE: ICD-10-CM

## 2018-09-05 DIAGNOSIS — E79.0 HYPERURICEMIA: ICD-10-CM

## 2018-09-05 DIAGNOSIS — C44.91 MALIGNANT BASAL CELL NEOPLASM OF SKIN: ICD-10-CM

## 2018-09-05 DIAGNOSIS — Z23 NEEDS FLU SHOT: ICD-10-CM

## 2018-09-05 DIAGNOSIS — R06.09 DOE (DYSPNEA ON EXERTION): ICD-10-CM

## 2018-09-05 DIAGNOSIS — R00.2 PALPITATIONS: ICD-10-CM

## 2018-09-05 DIAGNOSIS — G47.33 OSA (OBSTRUCTIVE SLEEP APNEA): ICD-10-CM

## 2018-09-05 LAB
ALBUMIN SERPL-MCNC: 4.2 G/DL (ref 3.9–4.9)
ALP BLD-CCNC: 76 U/L (ref 35–104)
ALT SERPL-CCNC: 32 U/L (ref 0–41)
ANION GAP SERPL CALCULATED.3IONS-SCNC: 14 MEQ/L (ref 7–13)
AST SERPL-CCNC: 31 U/L (ref 0–40)
BILIRUB SERPL-MCNC: 0.4 MG/DL (ref 0–1.2)
BUN BLDV-MCNC: 16 MG/DL (ref 8–23)
CALCIUM SERPL-MCNC: 9.2 MG/DL (ref 8.6–10.2)
CHLORIDE BLD-SCNC: 102 MEQ/L (ref 98–107)
CHOLESTEROL, TOTAL: 117 MG/DL (ref 0–199)
CO2: 26 MEQ/L (ref 22–29)
CREAT SERPL-MCNC: 1.29 MG/DL (ref 0.7–1.2)
GFR AFRICAN AMERICAN: >60
GFR NON-AFRICAN AMERICAN: 54.5
GLOBULIN: 3.2 G/DL (ref 2.3–3.5)
GLUCOSE BLD-MCNC: 104 MG/DL (ref 74–109)
HBA1C MFR BLD: 6.3 % (ref 4.8–5.9)
HCT VFR BLD CALC: 46.9 % (ref 42–52)
HDLC SERPL-MCNC: 40 MG/DL (ref 40–59)
HEMOGLOBIN: 15.9 G/DL (ref 14–18)
LDL CHOLESTEROL CALCULATED: 48 MG/DL (ref 0–129)
MCH RBC QN AUTO: 32 PG (ref 27–31.3)
MCHC RBC AUTO-ENTMCNC: 33.8 % (ref 33–37)
MCV RBC AUTO: 94.6 FL (ref 80–100)
PDW BLD-RTO: 14 % (ref 11.5–14.5)
PLATELET # BLD: 163 K/UL (ref 130–400)
POTASSIUM SERPL-SCNC: 4.6 MEQ/L (ref 3.5–5.1)
RBC # BLD: 4.96 M/UL (ref 4.7–6.1)
SODIUM BLD-SCNC: 142 MEQ/L (ref 132–144)
TOTAL PROTEIN: 7.4 G/DL (ref 6.4–8.1)
TRIGL SERPL-MCNC: 143 MG/DL (ref 0–200)
URIC ACID, SERUM: 7.2 MG/DL (ref 3.4–7)
WBC # BLD: 7.3 K/UL (ref 4.8–10.8)

## 2018-09-05 PROCEDURE — 1101F PT FALLS ASSESS-DOCD LE1/YR: CPT | Performed by: FAMILY MEDICINE

## 2018-09-05 PROCEDURE — G0008 ADMIN INFLUENZA VIRUS VAC: HCPCS | Performed by: FAMILY MEDICINE

## 2018-09-05 PROCEDURE — 1036F TOBACCO NON-USER: CPT | Performed by: FAMILY MEDICINE

## 2018-09-05 PROCEDURE — 1123F ACP DISCUSS/DSCN MKR DOCD: CPT | Performed by: FAMILY MEDICINE

## 2018-09-05 PROCEDURE — 3017F COLORECTAL CA SCREEN DOC REV: CPT | Performed by: FAMILY MEDICINE

## 2018-09-05 PROCEDURE — 90662 IIV NO PRSV INCREASED AG IM: CPT | Performed by: FAMILY MEDICINE

## 2018-09-05 PROCEDURE — 4040F PNEUMOC VAC/ADMIN/RCVD: CPT | Performed by: FAMILY MEDICINE

## 2018-09-05 PROCEDURE — G8427 DOCREV CUR MEDS BY ELIG CLIN: HCPCS | Performed by: FAMILY MEDICINE

## 2018-09-05 PROCEDURE — 99213 OFFICE O/P EST LOW 20 MIN: CPT | Performed by: FAMILY MEDICINE

## 2018-09-05 PROCEDURE — G8417 CALC BMI ABV UP PARAM F/U: HCPCS | Performed by: FAMILY MEDICINE

## 2018-09-05 NOTE — PROGRESS NOTES
Vaccine Information Sheet, \"Influenza - Inactivated\"  given to [facility], or parent/legal guardian of  [facility] and verbalized understanding. Patient responses:    Have you ever had a reaction to a flu vaccine? No  Are you able to eat eggs without adverse effects? Yes  Do you have any current illness? No  Have you ever had Guillian [place] Syndrome? No    Flu vaccine given per order. Please see immunization tab.
Ht 5' 4\" (1.626 m)   Wt 229 lb (103.9 kg)   SpO2 97%   BMI 39.31 kg/m²     Gen: Well, NAD, Alert, Oriented x 3   HEENT: EOMI, eyes clear, MMM  Skin: without rash or jaundice  Neck: no significant lymphadenopathy or thyromegaly  Lungs: CTA B w/out Rales/Wheezes/Rhonchi, Good respiratory effort   Heart: RRR, S1S2, w/out M/R/G, non-displaced PMI   Abdomen: Soft NT/ND, w/out R/G, w/ +BSx4 , rectus diastasis  Ext: trace BLE edema   Neuro: Neurovascularly intact w/ Sensory/Motor intact UE/LE Bilaterally. Rectal - deferred - sees urology annually  Psych:generally euthymic    Lab Results   Component Value Date    WBC 7.4 03/05/2018    HGB 15.4 03/05/2018    HCT 45.3 03/05/2018     03/05/2018    CHOL 121 03/05/2018    TRIG 148 03/05/2018    HDL 43 03/05/2018    ALT 35 03/05/2018    AST 28 03/05/2018     03/05/2018    K 3.9 03/05/2018     03/05/2018    CREATININE 1.09 03/05/2018    BUN 20 03/05/2018    CO2 24 03/05/2018    TSH 1.800 01/14/2015    PSA 1.0 04/01/2012    LABA1C 6.0 (H) 03/05/2018           A&P   Diagnosis Orders   1. Gastroesophageal reflux disease without esophagitis     2. Hyperlipidemia, unspecified hyperlipidemia type  Lipid Panel   3. Essential hypertension  Comprehensive Metabolic Panel    CBC   4. JOSE (obstructive sleep apnea)     5. Malignant basal cell neoplasm of skin     6. ARRIAGA (dyspnea on exertion)     7. Palpitations     8. Needs flu shot  INFLUENZA, HIGH DOSE, 65 YRS +, IM, PF, PREFILL SYR, 0.5ML (FLUZONE HD)   9. Hyperglycemia  Hemoglobin A1C   10. Hyperuricemia  Uric Acid       psa through urology    Reviewed all chronic issues   Labs today    Chronic issues are generally well-controlled.      Continue present regimen of medications    Patient Counseling:  --Nutrition: Stressed importance of moderation in sodium/caffeine intake, saturated fat and cholesterol, caloric balance, sufficient intake of fresh fruits, vegetables, fiber-  --Exercise: Stressed the importance of

## 2018-10-01 RX ORDER — ALLOPURINOL 100 MG/1
TABLET ORAL
Qty: 30 TABLET | Refills: 5 | Status: SHIPPED | OUTPATIENT
Start: 2018-10-01 | End: 2019-03-23 | Stop reason: SDUPTHER

## 2018-11-08 ENCOUNTER — OFFICE VISIT (OUTPATIENT)
Dept: FAMILY MEDICINE CLINIC | Age: 73
End: 2018-11-08
Payer: MEDICARE

## 2018-11-08 VITALS
OXYGEN SATURATION: 98 % | TEMPERATURE: 97.6 F | HEIGHT: 64 IN | DIASTOLIC BLOOD PRESSURE: 100 MMHG | HEART RATE: 70 BPM | RESPIRATION RATE: 20 BRPM | BODY MASS INDEX: 39.71 KG/M2 | WEIGHT: 232.6 LBS | SYSTOLIC BLOOD PRESSURE: 140 MMHG

## 2018-11-08 DIAGNOSIS — L73.9 FOLLICULITIS: Primary | ICD-10-CM

## 2018-11-08 PROCEDURE — G8510 SCR DEP NEG, NO PLAN REQD: HCPCS | Performed by: NURSE PRACTITIONER

## 2018-11-08 PROCEDURE — 99213 OFFICE O/P EST LOW 20 MIN: CPT | Performed by: NURSE PRACTITIONER

## 2018-11-08 PROCEDURE — 3288F FALL RISK ASSESSMENT DOCD: CPT | Performed by: NURSE PRACTITIONER

## 2018-11-08 RX ORDER — CEPHALEXIN 500 MG/1
500 CAPSULE ORAL 3 TIMES DAILY
Qty: 21 CAPSULE | Refills: 0 | Status: SHIPPED | OUTPATIENT
Start: 2018-11-08 | End: 2018-11-15

## 2018-11-08 ASSESSMENT — ENCOUNTER SYMPTOMS
RHINORRHEA: 1
COUGH: 0
SHORTNESS OF BREATH: 0
CHEST TIGHTNESS: 0

## 2018-11-08 ASSESSMENT — PATIENT HEALTH QUESTIONNAIRE - PHQ9
SUM OF ALL RESPONSES TO PHQ9 QUESTIONS 1 & 2: 0
1. LITTLE INTEREST OR PLEASURE IN DOING THINGS: 0
SUM OF ALL RESPONSES TO PHQ QUESTIONS 1-9: 0
SUM OF ALL RESPONSES TO PHQ QUESTIONS 1-9: 0
2. FEELING DOWN, DEPRESSED OR HOPELESS: 0

## 2018-11-08 NOTE — PROGRESS NOTES
under left axilla. Surrounding area bright halo of erythema, as well as warm and firm. Psychiatric: He has a normal mood and affect. His behavior is normal. Judgment and thought content normal.   Vitals reviewed. POC Testing Today:No results found for this visit on 11/08/18. Assessment & Plan    Diagnosis Orders   1. Folliculitis  cephALEXin (KEFLEX) 500 MG capsule       No orders of the defined types were placed in this encounter. Patient is an MD.  Aware of treatment plan, verbalized understanding of treatment. Return if symptoms worsen or fail to improve, for follow up with your PCP. Side effects and adverse effects of any medication prescribed today, as well as treatment plan/rationale, follow-up care, and result expectations have been discussed with the patient. Expresses understanding and desires to proceed with treatment plan. Discussed signs and symptoms which require immediate follow-up in ED/call to 911. Understanding verbalized. I have reviewed and updated the electronic medical record.     Keri Almeida, APRN - CNP

## 2018-11-28 RX ORDER — EZETIMIBE AND SIMVASTATIN 10; 20 MG/1; MG/1
TABLET ORAL
Qty: 30 TABLET | Refills: 5 | Status: SHIPPED | OUTPATIENT
Start: 2018-11-28 | End: 2019-05-19 | Stop reason: SDUPTHER

## 2019-01-23 ENCOUNTER — OFFICE VISIT (OUTPATIENT)
Dept: CARDIOLOGY CLINIC | Age: 74
End: 2019-01-23
Payer: MEDICARE

## 2019-01-23 VITALS
HEIGHT: 64 IN | SYSTOLIC BLOOD PRESSURE: 130 MMHG | HEART RATE: 83 BPM | WEIGHT: 228.2 LBS | RESPIRATION RATE: 22 BRPM | OXYGEN SATURATION: 95 % | DIASTOLIC BLOOD PRESSURE: 82 MMHG | BODY MASS INDEX: 38.96 KG/M2

## 2019-01-23 DIAGNOSIS — I10 ESSENTIAL HYPERTENSION: Primary | ICD-10-CM

## 2019-01-23 DIAGNOSIS — R00.2 PALPITATIONS: ICD-10-CM

## 2019-01-23 PROCEDURE — 99213 OFFICE O/P EST LOW 20 MIN: CPT | Performed by: INTERNAL MEDICINE

## 2019-01-23 ASSESSMENT — ENCOUNTER SYMPTOMS
SHORTNESS OF BREATH: 0
COLOR CHANGE: 0
DIARRHEA: 0
APNEA: 0
BLOOD IN STOOL: 0
NAUSEA: 0
VOMITING: 0
CHEST TIGHTNESS: 0

## 2019-03-05 ENCOUNTER — OFFICE VISIT (OUTPATIENT)
Dept: FAMILY MEDICINE CLINIC | Age: 74
End: 2019-03-05
Payer: MEDICARE

## 2019-03-05 VITALS
WEIGHT: 231.4 LBS | OXYGEN SATURATION: 98 % | SYSTOLIC BLOOD PRESSURE: 122 MMHG | HEART RATE: 53 BPM | BODY MASS INDEX: 39.5 KG/M2 | HEIGHT: 64 IN | DIASTOLIC BLOOD PRESSURE: 74 MMHG

## 2019-03-05 DIAGNOSIS — K21.00 GASTROESOPHAGEAL REFLUX DISEASE WITH ESOPHAGITIS: Primary | ICD-10-CM

## 2019-03-05 DIAGNOSIS — R73.03 PREDIABETES: ICD-10-CM

## 2019-03-05 DIAGNOSIS — E78.5 HYPERLIPIDEMIA, UNSPECIFIED HYPERLIPIDEMIA TYPE: ICD-10-CM

## 2019-03-05 DIAGNOSIS — E79.0 HYPERURICEMIA: ICD-10-CM

## 2019-03-05 DIAGNOSIS — G47.33 OSA (OBSTRUCTIVE SLEEP APNEA): ICD-10-CM

## 2019-03-05 DIAGNOSIS — I10 ESSENTIAL HYPERTENSION: ICD-10-CM

## 2019-03-05 DIAGNOSIS — M19.011 PRIMARY OSTEOARTHRITIS OF RIGHT SHOULDER: ICD-10-CM

## 2019-03-05 DIAGNOSIS — R06.09 DOE (DYSPNEA ON EXERTION): ICD-10-CM

## 2019-03-05 LAB
ALBUMIN SERPL-MCNC: 4.6 G/DL (ref 3.5–4.6)
ALP BLD-CCNC: 66 U/L (ref 35–104)
ALT SERPL-CCNC: 37 U/L (ref 0–41)
ANION GAP SERPL CALCULATED.3IONS-SCNC: 16 MEQ/L (ref 9–15)
AST SERPL-CCNC: 33 U/L (ref 0–40)
BILIRUB SERPL-MCNC: 0.5 MG/DL (ref 0.2–0.7)
BUN BLDV-MCNC: 15 MG/DL (ref 8–23)
CALCIUM SERPL-MCNC: 9.3 MG/DL (ref 8.5–9.9)
CHLORIDE BLD-SCNC: 97 MEQ/L (ref 95–107)
CHOLESTEROL, TOTAL: 115 MG/DL (ref 0–199)
CO2: 25 MEQ/L (ref 20–31)
CREAT SERPL-MCNC: 1.13 MG/DL (ref 0.7–1.2)
GFR AFRICAN AMERICAN: >60
GFR NON-AFRICAN AMERICAN: >60
GLOBULIN: 3 G/DL (ref 2.3–3.5)
GLUCOSE BLD-MCNC: 80 MG/DL (ref 70–99)
HBA1C MFR BLD: 6.3 % (ref 4.8–5.9)
HCT VFR BLD CALC: 48 % (ref 42–52)
HDLC SERPL-MCNC: 40 MG/DL (ref 40–59)
HEMOGLOBIN: 16.3 G/DL (ref 14–18)
LDL CHOLESTEROL CALCULATED: 41 MG/DL (ref 0–129)
MCH RBC QN AUTO: 32.3 PG (ref 27–31.3)
MCHC RBC AUTO-ENTMCNC: 34 % (ref 33–37)
MCV RBC AUTO: 95.2 FL (ref 80–100)
PDW BLD-RTO: 13.9 % (ref 11.5–14.5)
PLATELET # BLD: 165 K/UL (ref 130–400)
POTASSIUM SERPL-SCNC: 4.9 MEQ/L (ref 3.4–4.9)
RBC # BLD: 5.05 M/UL (ref 4.7–6.1)
SODIUM BLD-SCNC: 138 MEQ/L (ref 135–144)
TOTAL PROTEIN: 7.6 G/DL (ref 6.3–8)
TRIGL SERPL-MCNC: 170 MG/DL (ref 0–150)
URIC ACID, SERUM: 6.7 MG/DL (ref 3.4–7)
WBC # BLD: 7.3 K/UL (ref 4.8–10.8)

## 2019-03-05 PROCEDURE — G8427 DOCREV CUR MEDS BY ELIG CLIN: HCPCS | Performed by: FAMILY MEDICINE

## 2019-03-05 PROCEDURE — 4040F PNEUMOC VAC/ADMIN/RCVD: CPT | Performed by: FAMILY MEDICINE

## 2019-03-05 PROCEDURE — 1123F ACP DISCUSS/DSCN MKR DOCD: CPT | Performed by: FAMILY MEDICINE

## 2019-03-05 PROCEDURE — 99213 OFFICE O/P EST LOW 20 MIN: CPT | Performed by: FAMILY MEDICINE

## 2019-03-05 PROCEDURE — 3017F COLORECTAL CA SCREEN DOC REV: CPT | Performed by: FAMILY MEDICINE

## 2019-03-05 PROCEDURE — 1036F TOBACCO NON-USER: CPT | Performed by: FAMILY MEDICINE

## 2019-03-05 PROCEDURE — G8482 FLU IMMUNIZE ORDER/ADMIN: HCPCS | Performed by: FAMILY MEDICINE

## 2019-03-05 PROCEDURE — 1101F PT FALLS ASSESS-DOCD LE1/YR: CPT | Performed by: FAMILY MEDICINE

## 2019-03-05 PROCEDURE — G8417 CALC BMI ABV UP PARAM F/U: HCPCS | Performed by: FAMILY MEDICINE

## 2019-03-05 RX ORDER — OMEPRAZOLE 10 MG/1
10 CAPSULE, DELAYED RELEASE ORAL DAILY
COMMUNITY

## 2019-03-05 RX ORDER — IBUPROFEN 200 MG
200 TABLET ORAL EVERY 6 HOURS PRN
COMMUNITY

## 2019-03-05 RX ORDER — DOCUSATE SODIUM 100 MG/1
100 CAPSULE, LIQUID FILLED ORAL 2 TIMES DAILY
COMMUNITY

## 2019-03-05 ASSESSMENT — PATIENT HEALTH QUESTIONNAIRE - PHQ9
SUM OF ALL RESPONSES TO PHQ QUESTIONS 1-9: 0
SUM OF ALL RESPONSES TO PHQ QUESTIONS 1-9: 0
1. LITTLE INTEREST OR PLEASURE IN DOING THINGS: 0
2. FEELING DOWN, DEPRESSED OR HOPELESS: 0
SUM OF ALL RESPONSES TO PHQ9 QUESTIONS 1 & 2: 0

## 2019-03-25 RX ORDER — ALLOPURINOL 100 MG/1
TABLET ORAL
Qty: 30 TABLET | Refills: 5 | Status: SHIPPED | OUTPATIENT
Start: 2019-03-25 | End: 2019-09-19 | Stop reason: SDUPTHER

## 2019-05-20 RX ORDER — METOPROLOL TARTRATE 50 MG/1
TABLET, FILM COATED ORAL
Qty: 180 TABLET | Refills: 2 | Status: SHIPPED | OUTPATIENT
Start: 2019-05-20 | End: 2020-02-18 | Stop reason: SDUPTHER

## 2019-05-20 RX ORDER — HYDROCHLOROTHIAZIDE 25 MG/1
TABLET ORAL
Qty: 90 TABLET | Refills: 2 | Status: SHIPPED | OUTPATIENT
Start: 2019-05-20 | End: 2020-03-09

## 2019-05-20 RX ORDER — EZETIMIBE AND SIMVASTATIN 10; 20 MG/1; MG/1
TABLET ORAL
Qty: 30 TABLET | Refills: 5 | Status: SHIPPED | OUTPATIENT
Start: 2019-05-20 | End: 2019-11-17 | Stop reason: SDUPTHER

## 2019-05-20 RX ORDER — TERAZOSIN 10 MG/1
CAPSULE ORAL
Qty: 90 CAPSULE | Refills: 2 | Status: SHIPPED | OUTPATIENT
Start: 2019-05-20 | End: 2020-02-18 | Stop reason: SDUPTHER

## 2019-05-20 RX ORDER — FINASTERIDE 5 MG/1
TABLET, FILM COATED ORAL
Qty: 90 TABLET | Refills: 2 | Status: SHIPPED | OUTPATIENT
Start: 2019-05-20 | End: 2020-02-18

## 2019-07-23 ENCOUNTER — OFFICE VISIT (OUTPATIENT)
Dept: CARDIOLOGY CLINIC | Age: 74
End: 2019-07-23
Payer: MEDICARE

## 2019-07-23 VITALS
HEART RATE: 56 BPM | WEIGHT: 231 LBS | BODY MASS INDEX: 39.44 KG/M2 | HEIGHT: 64 IN | RESPIRATION RATE: 12 BRPM | DIASTOLIC BLOOD PRESSURE: 80 MMHG | SYSTOLIC BLOOD PRESSURE: 120 MMHG

## 2019-07-23 DIAGNOSIS — I10 ESSENTIAL HYPERTENSION: Primary | ICD-10-CM

## 2019-07-23 DIAGNOSIS — R00.2 PALPITATIONS: ICD-10-CM

## 2019-07-23 PROCEDURE — G8417 CALC BMI ABV UP PARAM F/U: HCPCS | Performed by: INTERNAL MEDICINE

## 2019-07-23 PROCEDURE — 3017F COLORECTAL CA SCREEN DOC REV: CPT | Performed by: INTERNAL MEDICINE

## 2019-07-23 PROCEDURE — 99213 OFFICE O/P EST LOW 20 MIN: CPT | Performed by: INTERNAL MEDICINE

## 2019-07-23 PROCEDURE — G8427 DOCREV CUR MEDS BY ELIG CLIN: HCPCS | Performed by: INTERNAL MEDICINE

## 2019-07-23 PROCEDURE — 4040F PNEUMOC VAC/ADMIN/RCVD: CPT | Performed by: INTERNAL MEDICINE

## 2019-07-23 PROCEDURE — 1036F TOBACCO NON-USER: CPT | Performed by: INTERNAL MEDICINE

## 2019-07-23 PROCEDURE — 1123F ACP DISCUSS/DSCN MKR DOCD: CPT | Performed by: INTERNAL MEDICINE

## 2019-07-23 RX ORDER — PHENOL 1.4 %
1 AEROSOL, SPRAY (ML) MUCOUS MEMBRANE DAILY
COMMUNITY
End: 2021-11-05

## 2019-07-23 ASSESSMENT — ENCOUNTER SYMPTOMS
ANAL BLEEDING: 0
ABDOMINAL PAIN: 0
BLOOD IN STOOL: 0
APNEA: 0
SHORTNESS OF BREATH: 0
NAUSEA: 0
DIARRHEA: 0
VOMITING: 0
COUGH: 0
CHEST TIGHTNESS: 0
COLOR CHANGE: 0
ABDOMINAL DISTENTION: 0

## 2019-07-23 NOTE — PROGRESS NOTES
None     Emotionally abused: None     Physically abused: None     Forced sexual activity: None   Other Topics Concern    None   Social History Narrative    None       No Known Allergies    Current Outpatient Medications   Medication Sig Dispense Refill    Multiple Vitamins-Minerals (MULTIVITAMIN ADULTS 50+) TABS Take 1 tablet by mouth daily      finasteride (PROSCAR) 5 MG tablet TAKE 1 TABLET BY MOUTH DAILY 90 tablet 2    terazosin (HYTRIN) 10 MG capsule TAKE 1 CAPSULE BY MOUTH DAILY AT BEDTIME 90 capsule 2    metoprolol tartrate (LOPRESSOR) 50 MG tablet TAKE 1 TABLET BY MOUTH TWICE DAILY 180 tablet 2    ezetimibe-simvastatin (VYTORIN) 10-20 MG per tablet TAKE 1 TABLET BY MOUTH NIGHTLY 30 tablet 5    hydrochlorothiazide (HYDRODIURIL) 25 MG tablet TAKE 1 TABLET BY MOUTH DAILY 90 tablet 2    allopurinol (ZYLOPRIM) 100 MG tablet Take 1 tablet by mouth daily. 30 tablet 5    omeprazole (PRILOSEC) 10 MG delayed release capsule Take 10 mg by mouth daily      ibuprofen (ADVIL;MOTRIN) 200 MG tablet Take 200 mg by mouth every 6 hours as needed for Pain      docusate sodium (COLACE) 100 MG capsule Take 100 mg by mouth 2 times daily      Cholecalciferol (VITAMIN D) 2000 units CAPS capsule Take by mouth      Coenzyme Q10 (CO Q 10 PO) Take  by mouth.  aspirin 325 MG tablet Take 325 mg by mouth daily. No current facility-administered medications for this visit. Review of Systems:   Review of Systems   Constitutional: Negative for activity change, appetite change, diaphoresis and fatigue. Respiratory: Negative for apnea, cough, chest tightness and shortness of breath. Cardiovascular: Negative for chest pain, palpitations and leg swelling. Gastrointestinal: Negative for abdominal distention, abdominal pain, anal bleeding, blood in stool, diarrhea, nausea and vomiting. Musculoskeletal: Negative for gait problem and myalgias. Skin: Negative for color change, pallor, rash and wound. Neurological: Negative for dizziness, syncope, speech difficulty, weakness, light-headedness, numbness and headaches. Hematological: Does not bruise/bleed easily. Psychiatric/Behavioral: Negative for agitation, behavioral problems and confusion. The patient is not nervous/anxious and is not hyperactive. All other systems reviewed and are negative. Review of System is negative except for as mentioned above. Physical Examination:    /80   Pulse 56   Resp 12   Ht 5' 4\" (1.626 m)   Wt 231 lb (104.8 kg)   BMI 39.65 kg/m²    Physical Exam   Constitutional: He appears healthy. HENT:   Nose: Nose normal.   Mouth/Throat: Dentition is normal. Oropharynx is clear. Eyes: Pupils are equal, round, and reactive to light. Neck: Normal range of motion. Cardiovascular: Normal rate, regular rhythm, S1 normal, S2 normal, normal heart sounds, intact distal pulses and normal pulses. No extrasystoles are present. Exam reveals no gallop. No murmur heard. Pulmonary/Chest: Effort normal and breath sounds normal. He has no wheezes. He has no rales. He exhibits no tenderness. Abdominal: Soft. Bowel sounds are normal. He exhibits no distension and no mass. There is no splenomegaly or hepatomegaly. There is no tenderness. Musculoskeletal: Normal range of motion. He exhibits no edema, tenderness or deformity. Neurological: He is alert and oriented to person, place, and time. He has normal motor skills and normal reflexes. Gait normal.   Skin: Skin is warm and dry.            Patient Active Problem List   Diagnosis    GERD (gastroesophageal reflux disease)    Hyperlipidemia    Hypertension    JOSE (obstructive sleep apnea)    Osteoarthritis    Kidney stones    Benign prostatic hyperplasia    Cataract    Malignant basal cell neoplasm of skin    Cellulitis    Rectus diastasis    Chest pain    Palpitations    ARRIAGA (dyspnea on exertion)    Extrasystole    Hyperuricemia    Prediabetes

## 2019-09-05 ENCOUNTER — OFFICE VISIT (OUTPATIENT)
Dept: FAMILY MEDICINE CLINIC | Age: 74
End: 2019-09-05
Payer: MEDICARE

## 2019-09-05 VITALS
HEART RATE: 48 BPM | HEIGHT: 64 IN | SYSTOLIC BLOOD PRESSURE: 130 MMHG | DIASTOLIC BLOOD PRESSURE: 86 MMHG | OXYGEN SATURATION: 96 % | BODY MASS INDEX: 38.62 KG/M2 | WEIGHT: 226.2 LBS

## 2019-09-05 DIAGNOSIS — E79.0 HYPERURICEMIA: ICD-10-CM

## 2019-09-05 DIAGNOSIS — N40.0 BENIGN PROSTATIC HYPERPLASIA WITHOUT LOWER URINARY TRACT SYMPTOMS: ICD-10-CM

## 2019-09-05 DIAGNOSIS — G47.33 OSA (OBSTRUCTIVE SLEEP APNEA): ICD-10-CM

## 2019-09-05 DIAGNOSIS — K21.9 GASTROESOPHAGEAL REFLUX DISEASE, ESOPHAGITIS PRESENCE NOT SPECIFIED: ICD-10-CM

## 2019-09-05 DIAGNOSIS — I10 ESSENTIAL HYPERTENSION: ICD-10-CM

## 2019-09-05 DIAGNOSIS — R73.03 PREDIABETES: ICD-10-CM

## 2019-09-05 DIAGNOSIS — E78.5 HYPERLIPIDEMIA, UNSPECIFIED HYPERLIPIDEMIA TYPE: Primary | ICD-10-CM

## 2019-09-05 DIAGNOSIS — M19.90 OSTEOARTHRITIS, UNSPECIFIED OSTEOARTHRITIS TYPE, UNSPECIFIED SITE: ICD-10-CM

## 2019-09-05 DIAGNOSIS — Z23 NEEDS FLU SHOT: ICD-10-CM

## 2019-09-05 DIAGNOSIS — Z23 NEED FOR SHINGLES VACCINE: ICD-10-CM

## 2019-09-05 LAB
ALBUMIN SERPL-MCNC: 4.2 G/DL (ref 3.5–4.6)
ALP BLD-CCNC: 75 U/L (ref 35–104)
ALT SERPL-CCNC: 34 U/L (ref 0–41)
ANION GAP SERPL CALCULATED.3IONS-SCNC: 14 MEQ/L (ref 9–15)
AST SERPL-CCNC: 29 U/L (ref 0–40)
BILIRUB SERPL-MCNC: 0.5 MG/DL (ref 0.2–0.7)
BUN BLDV-MCNC: 20 MG/DL (ref 8–23)
CALCIUM SERPL-MCNC: 9.7 MG/DL (ref 8.5–9.9)
CHLORIDE BLD-SCNC: 107 MEQ/L (ref 95–107)
CHOLESTEROL, TOTAL: 120 MG/DL (ref 0–199)
CO2: 23 MEQ/L (ref 20–31)
CREAT SERPL-MCNC: 1.37 MG/DL (ref 0.7–1.2)
GFR AFRICAN AMERICAN: >60
GFR NON-AFRICAN AMERICAN: 50.7
GLOBULIN: 3.5 G/DL (ref 2.3–3.5)
GLUCOSE BLD-MCNC: 114 MG/DL (ref 70–99)
HBA1C MFR BLD: 6.2 % (ref 4.8–5.9)
HCT VFR BLD CALC: 46.7 % (ref 42–52)
HDLC SERPL-MCNC: 42 MG/DL (ref 40–59)
HEMOGLOBIN: 15.4 G/DL (ref 14–18)
LDL CHOLESTEROL CALCULATED: 51 MG/DL (ref 0–129)
MCH RBC QN AUTO: 31.7 PG (ref 27–31.3)
MCHC RBC AUTO-ENTMCNC: 33 % (ref 33–37)
MCV RBC AUTO: 95.9 FL (ref 80–100)
PDW BLD-RTO: 13.7 % (ref 11.5–14.5)
PLATELET # BLD: 166 K/UL (ref 130–400)
POTASSIUM SERPL-SCNC: 4.7 MEQ/L (ref 3.4–4.9)
RBC # BLD: 4.87 M/UL (ref 4.7–6.1)
SODIUM BLD-SCNC: 144 MEQ/L (ref 135–144)
TOTAL PROTEIN: 7.7 G/DL (ref 6.3–8)
TRIGL SERPL-MCNC: 136 MG/DL (ref 0–150)
URIC ACID, SERUM: 5.9 MG/DL (ref 3.4–7)
WBC # BLD: 7 K/UL (ref 4.8–10.8)

## 2019-09-05 PROCEDURE — 4040F PNEUMOC VAC/ADMIN/RCVD: CPT | Performed by: FAMILY MEDICINE

## 2019-09-05 PROCEDURE — G8427 DOCREV CUR MEDS BY ELIG CLIN: HCPCS | Performed by: FAMILY MEDICINE

## 2019-09-05 PROCEDURE — 90653 IIV ADJUVANT VACCINE IM: CPT | Performed by: FAMILY MEDICINE

## 2019-09-05 PROCEDURE — 3017F COLORECTAL CA SCREEN DOC REV: CPT | Performed by: FAMILY MEDICINE

## 2019-09-05 PROCEDURE — 1036F TOBACCO NON-USER: CPT | Performed by: FAMILY MEDICINE

## 2019-09-05 PROCEDURE — G8417 CALC BMI ABV UP PARAM F/U: HCPCS | Performed by: FAMILY MEDICINE

## 2019-09-05 PROCEDURE — G0008 ADMIN INFLUENZA VIRUS VAC: HCPCS | Performed by: FAMILY MEDICINE

## 2019-09-05 PROCEDURE — 99214 OFFICE O/P EST MOD 30 MIN: CPT | Performed by: FAMILY MEDICINE

## 2019-09-05 PROCEDURE — 1123F ACP DISCUSS/DSCN MKR DOCD: CPT | Performed by: FAMILY MEDICINE

## 2019-09-05 NOTE — PROGRESS NOTES
Chief Complaint   Patient presents with    Gastroesophageal Reflux     6 month        HPI:  Aman Salazar is a 76 y.o.   6 month f/u visit. Needs refills. 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      Patient Active Problem List   Diagnoses    GERD (gastroesophageal reflux disease) - controlled at this time    Hyperlipidemia - taking meds without incident, active, watching diet    Hypertension - controlled, normal stress in past few years    JOSE (obstructive sleep apnea) - refused sleep study     Osteoarthritis - general aches and pains - were better with CoQ10    Kidney stones - no attacks    BPH (benign prostatic hypertrophy) - sees urology annually    Cataract - he is following with ophthalmology    Hyperuricemia - no gout, only kidney stone in past,         Review of Systems:   General ROS: some fatigue on occ.    Respiratory ROS:no cough, shortness of breath, or wheezing  Cardiovascular ROS: occ palpitations, no chest pain or dyspnea on exertion  Gastrointestinal ROS: no blood in stool, occ constipation, lactulose on occasion, takes large BMs he states  Genito-Urinary ROS:occ trouble voiding, on hytrin, sees urology  Musculoskeletal ROS: knee pains, right knee mostly, hands hurt on occasion - takes occasional aleve/ibuprofen   Some right achilles pain, occ back pain  Neurological ROS:has area of paresthesia on right hand, reports cervical radiculopathy symptoms on occasion negative for - behavioral changes, memory loss, numbness/tingling, tremors or weakness  Skin ROS: basal cell, seeing derm    In general patient otherwise reports feeling well. Doing some exercise    Physical Exam:  /86 (Site: Left Upper Arm)   Pulse (!) 48   Ht 5' 4\" (1.626 m)   Wt 226 lb 3.2 oz (102.6 kg)   SpO2 96%   BMI 38.83 kg/m²     Gen: Well, NAD, Alert, Oriented x 3   HEENT: EOMI, eyes clear, MMM  Skin: without rash or jaundice  Neck: no significant lymphadenopathy or thyromegaly  Lungs: CTA B w/out Rales/Wheezes/Rhonchi, Good respiratory effort   Heart: RRR, S1S2, w/out M/R/G, non-displaced PMI   Abdomen: Soft NT/ND, w/out R/G, w/ +BSx4 , rectus diastasis  Ext: trace BLE edema   Neuro: Neurovascularly intact w/ Sensory/Motor intact UE/LE Bilaterally. Rectal - deferred - sees urology annually  Psych:generally euthymic    Lab Results   Component Value Date    WBC 7.3 03/05/2019    HGB 16.3 03/05/2019    HCT 48.0 03/05/2019     03/05/2019    CHOL 115 03/05/2019    TRIG 170 (H) 03/05/2019    HDL 40 03/05/2019    ALT 37 03/05/2019    AST 33 03/05/2019     03/05/2019    K 4.9 03/05/2019    CL 97 03/05/2019    CREATININE 1.13 03/05/2019    BUN 15 03/05/2019    CO2 25 03/05/2019    TSH 1.800 01/14/2015    PSA 1.0 04/01/2012    LABA1C 6.3 (H) 03/05/2019           A&P   Diagnosis Orders   1. Hyperlipidemia, unspecified hyperlipidemia type     2. Needs flu shot  INFLUENZA, TRIV, INACTIVATED, SUBUNIT, ADJUVANTED, 65 YRS AND OLDER, IM, PREFILL SYR, 0.5ML (FLUAD TRIV)   3. Need for shingles vaccine  zoster recombinant adjuvanted vaccine [facility]) 50 MCG/0.5ML SUSR injection   4. Gastroesophageal reflux disease, esophagitis presence not specified     5. Essential hypertension  CBC    Comprehensive Metabolic Panel    Lipid Panel   6. JOSE (obstructive sleep apnea)     7. Osteoarthritis, unspecified osteoarthritis type, unspecified site     8. Hyperuricemia  Uric Acid   9. Prediabetes  Hemoglobin A1C   10.  Benign prostatic hyperplasia without lower urinary tract symptoms       No changes to regimen    psa through urology    Cardiology follow up

## 2019-09-19 RX ORDER — ALLOPURINOL 100 MG/1
TABLET ORAL
Qty: 30 TABLET | Refills: 5 | Status: SHIPPED | OUTPATIENT
Start: 2019-09-19 | End: 2020-03-23

## 2019-11-19 RX ORDER — EZETIMIBE AND SIMVASTATIN 10; 20 MG/1; MG/1
TABLET ORAL
Qty: 30 TABLET | Refills: 5 | Status: SHIPPED | OUTPATIENT
Start: 2019-11-19 | End: 2020-05-14

## 2019-11-30 ENCOUNTER — OFFICE VISIT (OUTPATIENT)
Dept: FAMILY MEDICINE CLINIC | Age: 74
End: 2019-11-30
Payer: MEDICARE

## 2019-11-30 VITALS
DIASTOLIC BLOOD PRESSURE: 76 MMHG | HEIGHT: 64 IN | BODY MASS INDEX: 38.58 KG/M2 | OXYGEN SATURATION: 97 % | SYSTOLIC BLOOD PRESSURE: 137 MMHG | HEART RATE: 54 BPM | WEIGHT: 226 LBS

## 2019-11-30 DIAGNOSIS — L02.91 CUTANEOUS ABSCESS, UNSPECIFIED SITE: Primary | ICD-10-CM

## 2019-11-30 DIAGNOSIS — L73.9 FOLLICULITIS OF LEFT AXILLA: ICD-10-CM

## 2019-11-30 PROCEDURE — 1036F TOBACCO NON-USER: CPT | Performed by: NURSE PRACTITIONER

## 2019-11-30 PROCEDURE — 1123F ACP DISCUSS/DSCN MKR DOCD: CPT | Performed by: NURSE PRACTITIONER

## 2019-11-30 PROCEDURE — G8482 FLU IMMUNIZE ORDER/ADMIN: HCPCS | Performed by: NURSE PRACTITIONER

## 2019-11-30 PROCEDURE — 99213 OFFICE O/P EST LOW 20 MIN: CPT | Performed by: NURSE PRACTITIONER

## 2019-11-30 PROCEDURE — G8427 DOCREV CUR MEDS BY ELIG CLIN: HCPCS | Performed by: NURSE PRACTITIONER

## 2019-11-30 PROCEDURE — 3017F COLORECTAL CA SCREEN DOC REV: CPT | Performed by: NURSE PRACTITIONER

## 2019-11-30 PROCEDURE — 4040F PNEUMOC VAC/ADMIN/RCVD: CPT | Performed by: NURSE PRACTITIONER

## 2019-11-30 PROCEDURE — G8417 CALC BMI ABV UP PARAM F/U: HCPCS | Performed by: NURSE PRACTITIONER

## 2019-11-30 RX ORDER — CLINDAMYCIN HYDROCHLORIDE 300 MG/1
300 CAPSULE ORAL 3 TIMES DAILY
Qty: 30 CAPSULE | Refills: 0 | Status: SHIPPED | OUTPATIENT
Start: 2019-11-30 | End: 2019-12-10

## 2019-11-30 ASSESSMENT — ENCOUNTER SYMPTOMS
EYE ITCHING: 0
DIARRHEA: 0
NAUSEA: 0
EYE DISCHARGE: 0
ABDOMINAL PAIN: 0
COUGH: 0
EYE REDNESS: 0

## 2019-12-02 ENCOUNTER — OFFICE VISIT (OUTPATIENT)
Dept: FAMILY MEDICINE CLINIC | Age: 74
End: 2019-12-02
Payer: MEDICARE

## 2019-12-02 VITALS
SYSTOLIC BLOOD PRESSURE: 124 MMHG | OXYGEN SATURATION: 98 % | WEIGHT: 230.2 LBS | BODY MASS INDEX: 39.3 KG/M2 | HEIGHT: 64 IN | RESPIRATION RATE: 16 BRPM | DIASTOLIC BLOOD PRESSURE: 74 MMHG | HEART RATE: 67 BPM | TEMPERATURE: 96.3 F

## 2019-12-02 DIAGNOSIS — L02.91 ABSCESS: Primary | ICD-10-CM

## 2019-12-02 PROCEDURE — 10060 I&D ABSCESS SIMPLE/SINGLE: CPT | Performed by: PHYSICIAN ASSISTANT

## 2019-12-02 ASSESSMENT — PATIENT HEALTH QUESTIONNAIRE - PHQ9
SUM OF ALL RESPONSES TO PHQ QUESTIONS 1-9: 0
SUM OF ALL RESPONSES TO PHQ QUESTIONS 1-9: 0
2. FEELING DOWN, DEPRESSED OR HOPELESS: 0
1. LITTLE INTEREST OR PLEASURE IN DOING THINGS: 0
SUM OF ALL RESPONSES TO PHQ9 QUESTIONS 1 & 2: 0

## 2019-12-03 RX ORDER — LIDOCAINE HYDROCHLORIDE AND EPINEPHRINE 10; 10 MG/ML; UG/ML
3 INJECTION, SOLUTION INFILTRATION; PERINEURAL ONCE
Status: DISCONTINUED | OUTPATIENT
Start: 2019-12-03 | End: 2022-03-10

## 2019-12-05 ASSESSMENT — ENCOUNTER SYMPTOMS: COLOR CHANGE: 1

## 2020-01-21 ENCOUNTER — OFFICE VISIT (OUTPATIENT)
Dept: CARDIOLOGY CLINIC | Age: 75
End: 2020-01-21
Payer: MEDICARE

## 2020-01-21 VITALS
BODY MASS INDEX: 39.27 KG/M2 | HEIGHT: 64 IN | RESPIRATION RATE: 18 BRPM | WEIGHT: 230 LBS | SYSTOLIC BLOOD PRESSURE: 122 MMHG | HEART RATE: 51 BPM | DIASTOLIC BLOOD PRESSURE: 84 MMHG | OXYGEN SATURATION: 98 %

## 2020-01-21 PROCEDURE — G8427 DOCREV CUR MEDS BY ELIG CLIN: HCPCS | Performed by: INTERNAL MEDICINE

## 2020-01-21 PROCEDURE — 1123F ACP DISCUSS/DSCN MKR DOCD: CPT | Performed by: INTERNAL MEDICINE

## 2020-01-21 PROCEDURE — G8417 CALC BMI ABV UP PARAM F/U: HCPCS | Performed by: INTERNAL MEDICINE

## 2020-01-21 PROCEDURE — 3017F COLORECTAL CA SCREEN DOC REV: CPT | Performed by: INTERNAL MEDICINE

## 2020-01-21 PROCEDURE — 1036F TOBACCO NON-USER: CPT | Performed by: INTERNAL MEDICINE

## 2020-01-21 PROCEDURE — 4040F PNEUMOC VAC/ADMIN/RCVD: CPT | Performed by: INTERNAL MEDICINE

## 2020-01-21 PROCEDURE — G8482 FLU IMMUNIZE ORDER/ADMIN: HCPCS | Performed by: INTERNAL MEDICINE

## 2020-01-21 PROCEDURE — 99213 OFFICE O/P EST LOW 20 MIN: CPT | Performed by: INTERNAL MEDICINE

## 2020-01-21 ASSESSMENT — ENCOUNTER SYMPTOMS
VOMITING: 0
APNEA: 0
BLOOD IN STOOL: 0
NAUSEA: 0
DIARRHEA: 0
SHORTNESS OF BREATH: 0
ABDOMINAL DISTENTION: 0
COLOR CHANGE: 0
CHEST TIGHTNESS: 0

## 2020-01-21 NOTE — PROGRESS NOTES
[facility] CARDIOLOGY OFFICE FOLLOW-UP      Patient: Ashley Lucas  YOB: 1945  MRN: [id number]    Chief Complaint:  Chief Complaint   Patient presents with    6 Month Follow-Up    Hypertension    Palpitations         Subjective/HPI:  1/21/2020: Patient presents today for follow-up of hypertension hyperlipidemia. Retired from [employer]. Goes to see Dr. Sherryle Bouchard ([facility]) seizure. No congestive heart failure symptoms. In the summer months is busy. Mows his lawn. He has kayak. See me in 6 months.     7/23/19: Patient presents today for for follow-up of hypertension and hyperlipidemia. Retired from [employer]. Has slowed down a little bit. Still has not taken [place] the lake. He plans to do that this weekend. Has chronic back issues. No smoking or alcohol use. Continue same medication and see me in 6 months     1/23/19: Patient presents today for follow-up of hypertension hyperlipidemia. He is retired from [employer]. No chest pain congestive heart failure symptoms or syncope. History of kidney stones . No recent episodes. See me in 6 months.     7/25/18: Patient presents today for follow-up of hypertension hyperlipidemia. Retired from [employer]. Has kidney stones that is stable. Still fairly active. No chest pain angina. See me in 6 months     1/24/2018: Patient presents today for follow-up of hypertension hyperlipidemia. He is retired from [employer]. Has lost about 7 pounds. Denies any chest pain congestive heart failure. Has a history of kidney stones that are stable. See me in 6 months.        7/25/17: Patient presents today for follow-up of chest pain. Has hypertension and hyperlipidemia that stable also has a history of kidney stone. He kayaks near his home. Fairly active. No chest pain congestive heart failure symptoms or syncope. See me in 6 months in the [place] office.     1/24/17: For follow-up of chest pain. Doing well otherwise.  Has hypertension and hyperlipidemia and history of

## 2020-02-18 RX ORDER — TERAZOSIN 10 MG/1
CAPSULE ORAL
Qty: 90 CAPSULE | Refills: 0 | Status: SHIPPED
Start: 2020-02-18 | End: 2020-02-19 | Stop reason: SDUPTHER

## 2020-02-18 RX ORDER — METOPROLOL TARTRATE 50 MG/1
TABLET, FILM COATED ORAL
Qty: 180 TABLET | Refills: 0 | Status: SHIPPED
Start: 2020-02-18 | End: 2020-02-19 | Stop reason: SDUPTHER

## 2020-02-18 RX ORDER — FINASTERIDE 5 MG/1
TABLET, FILM COATED ORAL
Qty: 90 TABLET | Refills: 0 | Status: SHIPPED
Start: 2020-02-18 | End: 2020-02-19 | Stop reason: SDUPTHER

## 2020-02-19 RX ORDER — METOPROLOL TARTRATE 50 MG/1
TABLET, FILM COATED ORAL
Qty: 180 TABLET | Refills: 0 | Status: SHIPPED | OUTPATIENT
Start: 2020-02-19 | End: 2020-08-09

## 2020-02-19 RX ORDER — TERAZOSIN 10 MG/1
CAPSULE ORAL
Qty: 90 CAPSULE | Refills: 2 | Status: SHIPPED | OUTPATIENT
Start: 2020-02-19 | End: 2021-05-20

## 2020-03-05 ENCOUNTER — OFFICE VISIT (OUTPATIENT)
Dept: FAMILY MEDICINE CLINIC | Age: 75
End: 2020-03-05
Payer: MEDICARE

## 2020-03-05 VITALS
SYSTOLIC BLOOD PRESSURE: 116 MMHG | BODY MASS INDEX: 38.76 KG/M2 | DIASTOLIC BLOOD PRESSURE: 66 MMHG | HEIGHT: 64 IN | OXYGEN SATURATION: 95 % | WEIGHT: 227 LBS | HEART RATE: 55 BPM

## 2020-03-05 DIAGNOSIS — Z12.5 SCREENING PSA (PROSTATE SPECIFIC ANTIGEN): ICD-10-CM

## 2020-03-05 DIAGNOSIS — E79.0 HYPERURICEMIA: ICD-10-CM

## 2020-03-05 DIAGNOSIS — I10 ESSENTIAL HYPERTENSION: ICD-10-CM

## 2020-03-05 PROBLEM — E66.01 MORBIDLY OBESE (HCC): Status: ACTIVE | Noted: 2020-03-05

## 2020-03-05 LAB
ALBUMIN SERPL-MCNC: 4.1 G/DL (ref 3.5–4.6)
ALP BLD-CCNC: 72 U/L (ref 35–104)
ALT SERPL-CCNC: 31 U/L (ref 0–41)
ANION GAP SERPL CALCULATED.3IONS-SCNC: 15 MEQ/L (ref 9–15)
AST SERPL-CCNC: 30 U/L (ref 0–40)
BILIRUB SERPL-MCNC: 0.4 MG/DL (ref 0.2–0.7)
BUN BLDV-MCNC: 18 MG/DL (ref 8–23)
CALCIUM SERPL-MCNC: 9.3 MG/DL (ref 8.5–9.9)
CHLORIDE BLD-SCNC: 100 MEQ/L (ref 95–107)
CHOLESTEROL, TOTAL: 112 MG/DL (ref 0–199)
CO2: 25 MEQ/L (ref 20–31)
CREAT SERPL-MCNC: 1.28 MG/DL (ref 0.7–1.2)
GFR AFRICAN AMERICAN: >60
GFR NON-AFRICAN AMERICAN: 54.8
GLOBULIN: 3.6 G/DL (ref 2.3–3.5)
GLUCOSE BLD-MCNC: 114 MG/DL (ref 70–99)
HCT VFR BLD CALC: 46.9 % (ref 42–52)
HDLC SERPL-MCNC: 44 MG/DL (ref 40–59)
HEMOGLOBIN: 15.8 G/DL (ref 14–18)
LDL CHOLESTEROL CALCULATED: 41 MG/DL (ref 0–129)
MCH RBC QN AUTO: 31.9 PG (ref 27–31.3)
MCHC RBC AUTO-ENTMCNC: 33.7 % (ref 33–37)
MCV RBC AUTO: 94.6 FL (ref 80–100)
PDW BLD-RTO: 13.5 % (ref 11.5–14.5)
PLATELET # BLD: 170 K/UL (ref 130–400)
POTASSIUM SERPL-SCNC: 4 MEQ/L (ref 3.4–4.9)
PROSTATE SPECIFIC ANTIGEN: 0.64 NG/ML (ref 0–6.22)
RBC # BLD: 4.96 M/UL (ref 4.7–6.1)
SODIUM BLD-SCNC: 140 MEQ/L (ref 135–144)
TOTAL PROTEIN: 7.7 G/DL (ref 6.3–8)
TRIGL SERPL-MCNC: 134 MG/DL (ref 0–150)
URIC ACID, SERUM: 7.2 MG/DL (ref 3.4–7)
WBC # BLD: 8.1 K/UL (ref 4.8–10.8)

## 2020-03-05 PROCEDURE — G8482 FLU IMMUNIZE ORDER/ADMIN: HCPCS | Performed by: FAMILY MEDICINE

## 2020-03-05 PROCEDURE — 1123F ACP DISCUSS/DSCN MKR DOCD: CPT | Performed by: FAMILY MEDICINE

## 2020-03-05 PROCEDURE — 4040F PNEUMOC VAC/ADMIN/RCVD: CPT | Performed by: FAMILY MEDICINE

## 2020-03-05 PROCEDURE — 3017F COLORECTAL CA SCREEN DOC REV: CPT | Performed by: FAMILY MEDICINE

## 2020-03-05 PROCEDURE — G0438 PPPS, INITIAL VISIT: HCPCS | Performed by: FAMILY MEDICINE

## 2020-03-05 ASSESSMENT — PATIENT HEALTH QUESTIONNAIRE - PHQ9
SUM OF ALL RESPONSES TO PHQ QUESTIONS 1-9: 0
SUM OF ALL RESPONSES TO PHQ QUESTIONS 1-9: 0

## 2020-03-05 ASSESSMENT — LIFESTYLE VARIABLES: HOW OFTEN DO YOU HAVE A DRINK CONTAINING ALCOHOL: 0

## 2020-03-05 NOTE — PROGRESS NOTES
Medicare Annual Wellness Visit  Name: Nito Cullen Date: 3/5/2020   MRN: [id number] Sex: Male   Age: 76 y.o. Ethnicity: Non-/Non    : 1945 Race: Risa Ferguson is here for Medicare AWV    Screenings for behavioral, psychosocial and functional/safety risks, and cognitive dysfunction are all negative except as indicated below. These results, as well as other patient data from the [address] [facility] [address] form, are documented in Flowsheets linked to this Encounter. Nothing acute    Chronic issues stable    Patient Active Problem List   Diagnosis    GERD (gastroesophageal reflux disease)    Hyperlipidemia    Hypertension    JOSE (obstructive sleep apnea)    Osteoarthritis    Kidney stones    Benign prostatic hyperplasia    Cataract    Malignant basal cell neoplasm of skin    Cellulitis    Rectus diastasis    Chest pain    Palpitations    ARRIAGA (dyspnea on exertion)    Extrasystole    Hyperuricemia    Prediabetes    Morbidly obese (HCC)         No Known Allergies      Prior to Visit Medications    Medication Sig Taking?  Authorizing Provider   terazosin (HYTRIN) 10 MG capsule TAKE 1 CAPSULE BY MOUTH DAILY AT BEDTIME Yes Araceli Swenson MD   metoprolol tartrate (LOPRESSOR) 50 MG tablet TAKE 1 TABLET BY MOUTH TWICE DAILY Yes Araceli Swenson MD   ezetimibe-simvastatin (VYTORIN) 10-20 MG per tablet TAKE 1 TABLET BY MOUTH NIGHTLY Yes Araceli Swenson MD   allopurinol (ZYLOPRIM) 100 MG tablet TAKE 1 TABLET BY MOUTH DAILY Yes Araceli Swenson MD   Multiple Vitamins-Minerals (MULTIVITAMIN ADULTS 50+) TABS Take 1 tablet by mouth daily Yes Historical Provider, MD   hydrochlorothiazide (HYDRODIURIL) 25 MG tablet TAKE 1 TABLET BY MOUTH DAILY Yes Araceli Swenson MD   omeprazole (PRILOSEC) 10 MG delayed release capsule Take 10 mg by mouth daily Yes Historical Provider, MD   ibuprofen (ADVIL;MOTRIN) 200 MG tablet Take 200 mg by mouth every 6 hours as needed for Pain Yes Historical Provider, MD docusate sodium (COLACE) 100 MG capsule Take 100 mg by mouth 2 times daily Yes Historical Provider, MD   Cholecalciferol (VITAMIN D) 2000 units CAPS capsule Take by mouth Yes Historical Provider, MD   Coenzyme Q10 (CO Q 10 PO) Take  by mouth. Yes Historical Provider, MD   aspirin 325 MG tablet Take 325 mg by mouth daily. Yes Historical Provider, MD         Past Medical History:   Diagnosis Date    Basal cell cancer     multiple,  follows with Dr. Ruth Crawley (benign prostatic hypertrophy)     Cataract     ophthalmologist - Dr. Miriam [facility]en [facility]    Chest pain     Chronic kidney disease     ARRIAGA (dyspnea on exertion)     GERD (gastroesophageal reflux disease)     Hyperlipidemia     Hypertension     Hyperuricemia 9/5/2018    Kidney stones     HX of    Morbidly obese ([address] 3/5/2020    Normal cardiac stress test 1/2015    JOSE (obstructive sleep apnea)     Osteoarthritis     Palpitation     Rectus diastasis 9/10/2014       Past Surgical History:   Procedure Laterality Date    COLONOSCOPY  2005    COLONOSCOPY  9/20/13     [address] [address] EYE SURGERY  2012    cataracts    KNEE SURGERY      Left TKR    NOSE SURGERY      TONSILLECTOMY AND ADENOIDECTOMY           Family History   Family history unknown: Yes       CareTeam (Including outside providers/suppliers regularly involved in providing care):   Patient Care Team:  Tucker Aguilar MD as PCP - General (Family Medicine)  Tucker Aguilar MD as PCP - [facility] Empaneled Provider  Luisito Mota MD as Physician (Cardiology)  Joseph Oliva MD as Consulting Physician (Gastroenterology)    Wt Readings from Last 3 Encounters:   03/05/20 227 lb (103 kg)   01/21/20 230 lb (104.3 kg)   12/02/19 230 lb 3.2 oz (104.4 kg)     Vitals:    03/05/20 1014   BP: 116/66   Site: Left Upper Arm   Pulse: 55   SpO2: 95%   Weight: 227 lb (103 kg)   Height: 5' 4\" (1.626 m)     Body mass index is 38.96 kg/m².     Based upon direct observation of the patient, evaluation of cognition reveals recent and remote memory intact. Physical Exam:  /66 (Site: Left Upper Arm)   Pulse 55   Ht 5' 4\" (1.626 m)   Wt 227 lb (103 kg)   SpO2 95%   BMI 38.96 kg/m²     Gen: Well, NAD, Alert, Oriented x 3   HEENT: EOMI, eyes clear, MMM  Skin: without rash or jaundice  Neck: no significant lymphadenopathy or thyromegaly  Lungs: CTA B w/out Rales/Wheezes/Rhonchi, Good respiratory effort   Heart: RRR, S1S2, w/out M/R/G, non-displaced PMI   Abdomen: Soft NT/ND, w/out R/G, w/ +BSx4   Ext: No C/C/E Bilaterally. Neuro: Neurovascularly intact w/ Sensory/Motor intact UE/LE Bilaterally. Patient's complete Health Risk Assessment and screening values have been reviewed and are found in Flowsheets. The following problems were reviewed today and where indicated follow up appointments were made and/or referrals ordered. Positive Risk Factor Screenings with Interventions:     Health Habits/Nutrition:  Health Habits/Nutrition  Do you exercise for at least 20 minutes 2-3 times per week?: Yes  Have you lost any weight without trying in the past 3 months?: No  Do you eat fewer than 2 meals per day?: No  Have you seen a dentist within the past year?: Yes  Body mass index is 38.96 kg/m².   Health Habits/Nutrition Interventions:  · weight loss always advised    Safety:  Safety  Do you have working smoke detectors?: Yes  Have all throw rugs been removed or fastened?: (!) No  Do you have non-slip mats or surfaces in all bathtubs/showers?: Yes  Do all of your stairways have a railing or banister?: Yes  Are your doorways, halls and stairs free of clutter?: Yes  Do you always fasten your seatbelt when you are in a car?: Yes  Safety Interventions:  · Home safety tips provided    Personalized Preventive Plan   Current Health Maintenance Status  Immunization History   Administered Date(s) Administered    Influenza 10/12/2012    Influenza Virus Vaccine 09/22/2014    Influenza, High Dose (Fluzone

## 2020-03-09 RX ORDER — HYDROCHLOROTHIAZIDE 25 MG/1
TABLET ORAL
Qty: 90 TABLET | Refills: 2 | Status: SHIPPED | OUTPATIENT
Start: 2020-03-09 | End: 2020-11-08

## 2020-03-23 RX ORDER — ALLOPURINOL 100 MG/1
TABLET ORAL
Qty: 30 TABLET | Refills: 5 | Status: SHIPPED | OUTPATIENT
Start: 2020-03-23 | End: 2020-09-08

## 2020-05-14 RX ORDER — EZETIMIBE AND SIMVASTATIN 10; 20 MG/1; MG/1
TABLET ORAL
Qty: 30 TABLET | Refills: 5 | Status: SHIPPED | OUTPATIENT
Start: 2020-05-14 | End: 2020-11-08

## 2020-07-21 ENCOUNTER — VIRTUAL VISIT (OUTPATIENT)
Dept: CARDIOLOGY CLINIC | Age: 75
End: 2020-07-21
Payer: MEDICARE

## 2020-07-21 PROCEDURE — 99443 PR PHYS/QHP TELEPHONE EVALUATION 21-30 MIN: CPT | Performed by: INTERNAL MEDICINE

## 2020-07-21 ASSESSMENT — ENCOUNTER SYMPTOMS
COLOR CHANGE: 0
BLOOD IN STOOL: 0
CHEST TIGHTNESS: 0
DIARRHEA: 0
ABDOMINAL DISTENTION: 0
VOMITING: 0
SHORTNESS OF BREATH: 0
NAUSEA: 0
APNEA: 0

## 2020-07-21 NOTE — PROGRESS NOTES
plans to do that this weekend. Has chronic back issues. No smoking or alcohol use. Continue same medication and see me in 6 months     1/23/19: Patient presents today for follow-up of hypertension hyperlipidemia. He is retired from [employer]. No chest pain congestive heart failure symptoms or syncope. History of kidney stones . No recent episodes. See me in 6 months.     7/25/18: Patient presents today for follow-up of hypertension hyperlipidemia. Retired from [employer]. Has kidney stones that is stable. Still fairly active. No chest pain angina. See me in 6 months     1/24/2018: Patient presents today for follow-up of hypertension hyperlipidemia. He is retired from [employer]. Has lost about 7 pounds. Denies any chest pain congestive heart failure. Has a history of kidney stones that are stable. See me in 6 months.        7/25/17: Patient presents today for follow-up of chest pain. Has hypertension and hyperlipidemia that stable also has a history of kidney stone. He kayaks near his home. Fairly active. No chest pain congestive heart failure symptoms or syncope. See me in 6 months in the [place] office.     1/24/17: For follow-up of chest pain. Doing well otherwise. Has hypertension and hyperlipidemia and history of kidney stones. He will call [employer] in physical therapy. He is fairly active doing Summer months. He will see the yard and does mowing and landscaping.     7/26/16: For fu of CP. Resolved. Retired from [employer]. Has HTN,HLP and obesity. H/O kidney stones. See in [facility]      1/26/16: No angina,CHF,syncopy or claudication. See in 6M.          Past Medical History:   Diagnosis Date    Basal cell cancer     multiple,  follows with Dr. Cecile Neal BPH (benign prostatic hypertrophy)     Cataract     ophthalmologist - Dr. Beverely Lima [facility] eye [place]    Chest pain     Chronic kidney disease     ARRIAGA (dyspnea on exertion)     GERD (gastroesophageal reflux disease)     Hyperlipidemia     Hypertension     Hyperuricemia 9/5/2018    Kidney stones     HX of    Morbidly obese ([address] 3/5/2020    Normal cardiac stress test 1/2015    JOSE (obstructive sleep apnea)     Osteoarthritis     Palpitation     Rectus diastasis 9/10/2014       Past Surgical History:   Procedure Laterality Date    COLONOSCOPY  2005    COLONOSCOPY  9/20/13    DR. [facility] SURGERY  2012    cataracts    KNEE SURGERY      Left TKR    NOSE SURGERY      TONSILLECTOMY AND ADENOIDECTOMY         Family History   Family history unknown: Yes       Social History     Socioeconomic History    Marital status:      Spouse name: Not on file    Number of children: Not on file    Years of education: Not on file    Highest education level: Not on file   Occupational History    Not on file   Social Needs    Financial resource strain: Not on file    Food insecurity     Worry: Not on file     Inability: Not on file    Transportation needs     Medical: Not on file     Non-medical: Not on file   Tobacco Use    Smoking status: Never Smoker    Smokeless tobacco: Never Used   Substance and Sexual Activity    Alcohol use: No    Drug use: No    Sexual activity: Yes     Partners: Female   Lifestyle    Physical activity     Days per week: Not on file     Minutes per session: Not on file    Stress: Not on file   Relationships    Social connections     Talks on phone: Not on file     Gets together: Not on file     Attends Mor[employer] service: Not on file     Active member of club or organization: Not on file     Attends meetings of clubs or organizations: Not on file     Relationship status: Not on file    Intimate partner violence     Fear of current or ex partner: Not on file     Emotionally abused: Not on file     Physically abused: Not on file     Forced sexual activity: Not on file   Other Topics Concern    Not on file   Social History Narrative    Not on file       No Known Allergies    Current Outpatient Medications   Medication Sig Dispense Refill    ezetimibe-simvastatin (VYTORIN) 10-20 MG per tablet TAKE 1 TABLET BY MOUTH NIGHTLY 30 tablet 5    allopurinol (ZYLOPRIM) 100 MG tablet TAKE 1 TABLET BY MOUTH DAILY 30 tablet 5    hydroCHLOROthiazide (HYDRODIURIL) 25 MG tablet TAKE 1 TABLET BY MOUTH DAILY 90 tablet 2    terazosin (HYTRIN) 10 MG capsule TAKE 1 CAPSULE BY MOUTH DAILY AT BEDTIME 90 capsule 2    metoprolol tartrate (LOPRESSOR) 50 MG tablet TAKE 1 TABLET BY MOUTH TWICE DAILY 180 tablet 0    Multiple Vitamins-Minerals (MULTIVITAMIN ADULTS 50+) TABS Take 1 tablet by mouth daily      omeprazole (PRILOSEC) 10 MG delayed release capsule Take 10 mg by mouth daily      ibuprofen (ADVIL;MOTRIN) 200 MG tablet Take 200 mg by mouth every 6 hours as needed for Pain      docusate sodium (COLACE) 100 MG capsule Take 100 mg by mouth 2 times daily      Cholecalciferol (VITAMIN D) 2000 units CAPS capsule Take by mouth      Coenzyme Q10 (CO Q 10 PO) Take  by mouth.  aspirin 325 MG tablet Take 325 mg by mouth daily. Current Facility-Administered Medications   Medication Dose Route Frequency Provider Last Rate Last Dose    lidocaine-EPINEPHrine 1 percent-1:[id number] injection 3 mL  3 mL Other Once Jamglue, PA-C           Review of Systems:   Review of Systems   Constitutional: Negative for appetite change, diaphoresis and fatigue. HENT: Negative for nosebleeds. Respiratory: Negative for apnea, chest tightness and shortness of breath. Cardiovascular: Negative for chest pain, palpitations and leg swelling. Gastrointestinal: Negative for abdominal distention, blood in stool, diarrhea, nausea and vomiting. Musculoskeletal: Negative for myalgias, neck pain and neck stiffness. Skin: Negative for color change, pallor, rash and wound. Neurological: Negative for dizziness, seizures, syncope, weakness, light-headedness, numbness and headaches. Hematological: Does not bruise/bleed easily.    Psychiatric/Behavioral: Negative for agitation, behavioral problems and confusion. The patient is not nervous/anxious and is not hyperactive. All other systems reviewed and are negative. Review of System is negative except for as mentioned above. Physical Examination:    There were no vitals taken for this visit. Physical Exam   Constitutional: He appears healthy. HENT:   Nose: Nose normal.   Mouth/Throat: Dentition is normal. Oropharynx is clear. Eyes: Pupils are equal, round, and reactive to light. Neck: Normal range of motion. Cardiovascular: Normal rate, regular rhythm, S1 normal, S2 normal, normal heart sounds, intact distal pulses and normal pulses. No extrasystoles are present. Exam reveals no gallop. No murmur heard. Pulmonary/Chest: Effort normal and breath sounds normal. He has no wheezes. He has no rales. He exhibits no tenderness. Abdominal: Soft. Bowel sounds are normal. He exhibits no distension and no mass. There is no splenomegaly or hepatomegaly. There is no abdominal tenderness. Musculoskeletal: Normal range of motion. General: No tenderness, deformity or edema. Neurological: He is alert and oriented to person, place, and time. He has normal motor skills and normal reflexes. Gait normal.   Skin: Skin is warm and dry. Patient Active Problem List   Diagnosis    GERD (gastroesophageal reflux disease)    Hyperlipidemia    Hypertension    JOSE (obstructive sleep apnea)    Osteoarthritis    Kidney stones    Benign prostatic hyperplasia    Cataract    Malignant basal cell neoplasm of skin    Cellulitis    Rectus diastasis    Chest pain    Palpitations    ARRIAGA (dyspnea on exertion)    Extrasystole    Hyperuricemia    Prediabetes    Morbidly obese (HCC)           No orders of the defined types were placed in this encounter. No orders of the defined types were placed in this encounter. Assessment:    1. Essential hypertension    2.  Palpitations Plan:     Stay on same medications. See me in 6 months. This note was partially generated using Dragon voice recognition system, and there may be some incorrect words, spellings, punctuation that were not noticed in checking the note before saving.         Electronically signed by Bunny King MD on 7/21/2020 at 2:13 PM

## 2020-08-09 RX ORDER — METOPROLOL TARTRATE 50 MG/1
TABLET, FILM COATED ORAL
Qty: 180 TABLET | Refills: 1 | Status: SHIPPED | OUTPATIENT
Start: 2020-08-09 | End: 2021-02-16

## 2020-09-04 ENCOUNTER — OFFICE VISIT (OUTPATIENT)
Dept: FAMILY MEDICINE CLINIC | Age: 75
End: 2020-09-04
Payer: MEDICARE

## 2020-09-04 VITALS
OXYGEN SATURATION: 97 % | DIASTOLIC BLOOD PRESSURE: 80 MMHG | BODY MASS INDEX: 38.76 KG/M2 | TEMPERATURE: 97.1 F | HEART RATE: 42 BPM | HEIGHT: 64 IN | SYSTOLIC BLOOD PRESSURE: 118 MMHG | WEIGHT: 227 LBS

## 2020-09-04 DIAGNOSIS — R73.03 PREDIABETES: ICD-10-CM

## 2020-09-04 DIAGNOSIS — I10 ESSENTIAL HYPERTENSION: ICD-10-CM

## 2020-09-04 DIAGNOSIS — E79.0 HYPERURICEMIA: ICD-10-CM

## 2020-09-04 LAB
ALBUMIN SERPL-MCNC: 3.9 G/DL (ref 3.5–4.6)
ALP BLD-CCNC: 66 U/L (ref 35–104)
ALT SERPL-CCNC: 26 U/L (ref 0–41)
ANION GAP SERPL CALCULATED.3IONS-SCNC: 13 MEQ/L (ref 9–15)
AST SERPL-CCNC: 25 U/L (ref 0–40)
BILIRUB SERPL-MCNC: 0.4 MG/DL (ref 0.2–0.7)
BUN BLDV-MCNC: 18 MG/DL (ref 8–23)
CALCIUM SERPL-MCNC: 9.3 MG/DL (ref 8.5–9.9)
CHLORIDE BLD-SCNC: 105 MEQ/L (ref 95–107)
CHOLESTEROL, TOTAL: 113 MG/DL (ref 0–199)
CO2: 23 MEQ/L (ref 20–31)
CREAT SERPL-MCNC: 1.07 MG/DL (ref 0.7–1.2)
GFR AFRICAN AMERICAN: >60
GFR NON-AFRICAN AMERICAN: >60
GLOBULIN: 3.2 G/DL (ref 2.3–3.5)
GLUCOSE BLD-MCNC: 114 MG/DL (ref 70–99)
HBA1C MFR BLD: 6.3 % (ref 4.8–5.9)
HCT VFR BLD CALC: 45.9 % (ref 42–52)
HDLC SERPL-MCNC: 40 MG/DL (ref 40–59)
HEMOGLOBIN: 15.6 G/DL (ref 14–18)
LDL CHOLESTEROL CALCULATED: 45 MG/DL (ref 0–129)
MCH RBC QN AUTO: 32.4 PG (ref 27–31.3)
MCHC RBC AUTO-ENTMCNC: 34 % (ref 33–37)
MCV RBC AUTO: 95.3 FL (ref 80–100)
PDW BLD-RTO: 13.5 % (ref 11.5–14.5)
PLATELET # BLD: 156 K/UL (ref 130–400)
POTASSIUM SERPL-SCNC: 4.2 MEQ/L (ref 3.4–4.9)
RBC # BLD: 4.81 M/UL (ref 4.7–6.1)
SODIUM BLD-SCNC: 141 MEQ/L (ref 135–144)
TOTAL PROTEIN: 7.1 G/DL (ref 6.3–8)
TRIGL SERPL-MCNC: 139 MG/DL (ref 0–150)
URIC ACID, SERUM: 6.1 MG/DL (ref 3.4–7)
WBC # BLD: 7.7 K/UL (ref 4.8–10.8)

## 2020-09-04 PROCEDURE — 1036F TOBACCO NON-USER: CPT | Performed by: FAMILY MEDICINE

## 2020-09-04 PROCEDURE — 1123F ACP DISCUSS/DSCN MKR DOCD: CPT | Performed by: FAMILY MEDICINE

## 2020-09-04 PROCEDURE — 3017F COLORECTAL CA SCREEN DOC REV: CPT | Performed by: FAMILY MEDICINE

## 2020-09-04 PROCEDURE — G8417 CALC BMI ABV UP PARAM F/U: HCPCS | Performed by: FAMILY MEDICINE

## 2020-09-04 PROCEDURE — 99214 OFFICE O/P EST MOD 30 MIN: CPT | Performed by: FAMILY MEDICINE

## 2020-09-04 PROCEDURE — G8427 DOCREV CUR MEDS BY ELIG CLIN: HCPCS | Performed by: FAMILY MEDICINE

## 2020-09-04 PROCEDURE — 4040F PNEUMOC VAC/ADMIN/RCVD: CPT | Performed by: FAMILY MEDICINE

## 2020-09-08 RX ORDER — ALLOPURINOL 100 MG/1
TABLET ORAL
Qty: 30 TABLET | Refills: 5 | Status: SHIPPED | OUTPATIENT
Start: 2020-09-08 | End: 2021-04-01

## 2020-10-23 ENCOUNTER — NURSE ONLY (OUTPATIENT)
Dept: FAMILY MEDICINE CLINIC | Age: 75
End: 2020-10-23
Payer: MEDICARE

## 2020-10-23 PROCEDURE — G0008 ADMIN INFLUENZA VIRUS VAC: HCPCS | Performed by: FAMILY MEDICINE

## 2020-10-23 PROCEDURE — 90694 VACC AIIV4 NO PRSRV 0.5ML IM: CPT | Performed by: FAMILY MEDICINE

## 2020-10-23 NOTE — PROGRESS NOTES
Vaccine Information Sheet, \"Influenza - Inactivated\"  given to Robyn Shahid, or parent/legal guardian of  Robyn Shahid and verbalized understanding. Patient responses:    Have you ever had a reaction to a flu vaccine? No  Are you able to eat eggs without adverse effects? Yes  Do you have any current illness? No  Have you ever had Guillian Yeoman Syndrome? No    Flu vaccine given per order. Please see immunization tab. After obtaining consent, and per orders of Dr. Mitzi Parks, injection of FLUAD given in Left deltoid by Robert Gasca. Patient instructed to remain in clinic for 20 minutes afterwards, and to report any adverse reaction to me immediately.

## 2020-11-08 RX ORDER — HYDROCHLOROTHIAZIDE 25 MG/1
TABLET ORAL
Qty: 90 TABLET | Refills: 2 | Status: SHIPPED | OUTPATIENT
Start: 2020-11-08 | End: 2021-09-07

## 2020-11-08 RX ORDER — EZETIMIBE AND SIMVASTATIN 10; 20 MG/1; MG/1
TABLET ORAL
Qty: 30 TABLET | Refills: 5 | Status: SHIPPED | OUTPATIENT
Start: 2020-11-08 | End: 2021-06-07

## 2021-01-19 ENCOUNTER — OFFICE VISIT (OUTPATIENT)
Dept: CARDIOLOGY CLINIC | Age: 76
End: 2021-01-19
Payer: MEDICARE

## 2021-01-19 VITALS
SYSTOLIC BLOOD PRESSURE: 122 MMHG | WEIGHT: 234.8 LBS | HEIGHT: 64 IN | DIASTOLIC BLOOD PRESSURE: 84 MMHG | HEART RATE: 62 BPM | OXYGEN SATURATION: 98 % | RESPIRATION RATE: 22 BRPM | BODY MASS INDEX: 40.08 KG/M2

## 2021-01-19 DIAGNOSIS — I10 ESSENTIAL HYPERTENSION: Primary | ICD-10-CM

## 2021-01-19 DIAGNOSIS — R00.2 PALPITATIONS: ICD-10-CM

## 2021-01-19 DIAGNOSIS — N40.0 BENIGN PROSTATIC HYPERPLASIA WITHOUT LOWER URINARY TRACT SYMPTOMS: ICD-10-CM

## 2021-01-19 DIAGNOSIS — R06.09 DOE (DYSPNEA ON EXERTION): ICD-10-CM

## 2021-01-19 PROCEDURE — 4040F PNEUMOC VAC/ADMIN/RCVD: CPT | Performed by: INTERNAL MEDICINE

## 2021-01-19 PROCEDURE — 3017F COLORECTAL CA SCREEN DOC REV: CPT | Performed by: INTERNAL MEDICINE

## 2021-01-19 PROCEDURE — G8484 FLU IMMUNIZE NO ADMIN: HCPCS | Performed by: INTERNAL MEDICINE

## 2021-01-19 PROCEDURE — 1036F TOBACCO NON-USER: CPT | Performed by: INTERNAL MEDICINE

## 2021-01-19 PROCEDURE — 99214 OFFICE O/P EST MOD 30 MIN: CPT | Performed by: INTERNAL MEDICINE

## 2021-01-19 PROCEDURE — G8417 CALC BMI ABV UP PARAM F/U: HCPCS | Performed by: INTERNAL MEDICINE

## 2021-01-19 PROCEDURE — G8427 DOCREV CUR MEDS BY ELIG CLIN: HCPCS | Performed by: INTERNAL MEDICINE

## 2021-01-19 PROCEDURE — 1123F ACP DISCUSS/DSCN MKR DOCD: CPT | Performed by: INTERNAL MEDICINE

## 2021-01-19 ASSESSMENT — ENCOUNTER SYMPTOMS
SHORTNESS OF BREATH: 0
VOMITING: 0
APNEA: 0
DIARRHEA: 0
BLOOD IN STOOL: 0
CHEST TIGHTNESS: 0
NAUSEA: 0

## 2021-01-19 NOTE — PROGRESS NOTES
[facility] CARDIOLOGY OFFICE FOLLOW-UP      Patient: Howard Borden  YOB: 1945  MRN: [id number]    Chief Complaint:  Chief Complaint   Patient presents with    6 Month Follow-Up    Hypertension    Palpitations         Subjective/HPI:  1/19/21: Patient presents today for follow-up of hypertension. Hyperuricemia. Complains of left shoulder discomfort. Still smokes cigars. Complains of occasional palpitations. Has GERD and hyperuricemia. And hypertension. And dyslipidemia. I am going to have [facility]. And hopefully his shoulder will be better before spring is here. He likes to do boating he has BPH that stable. See me in 6 months       7/21/2020 (VIRTUAL): For follow-up of  Hypertension mild CHF. Still smokes cigars. Has not gone boating this year because of the COVID has some infection in the right toe which is healing up. He had ingrown toenail. No angina congestive heart failure. No ankle edema. See me in 6 months he has dyslipidemia and hypertension which is controlled       1/21/2020: Patient presents today for follow-up of hypertension hyperlipidemia. Retired from [employer]. Goes to see Dr. Katarzyna Swift ([facility]) seizure. No congestive heart failure symptoms. In the summer months is busy. Mows his lawn. He has kayak. See me in 6 months.     7/23/19: Patient presents today for for follow-up of hypertension and hyperlipidemia.  Retired from [employer] slowed down a little bit. Xin Reed has not taken his boat out to go on the [address] plans to do that this weekend.  Has chronic back issues.  No smoking or alcohol use.  Continue same medication and see me in 6 months     1/23/19: Patient presents today for follow-up of hypertension hyperlipidemia. He is retired from [employer]. No chest pain congestive heart failure symptoms or syncope. History of kidney stones . No recent episodes.  See me in 6 months.    cataracts    KNEE SURGERY      Left TKR    NOSE SURGERY      TONSILLECTOMY AND ADENOIDECTOMY         Family History   Family history unknown: Yes       Social History     Socioeconomic History    Marital status:      Spouse name: None    Number of children: None    Years of education: None    Highest education level: None   Occupational History    None   Social Needs    Financial resource strain: None    Food insecurity     Worry: None     Inability: None    Transportation needs     Medical: None     Non-medical: None   Tobacco Use    Smoking status: Never Smoker    Smokeless tobacco: Never Used   Substance and Sexual Activity    Alcohol use: No    Drug use: No    Sexual activity: Yes     Partners: Female   Lifestyle    Physical activity     Days per week: None     Minutes per session: None    Stress: None   Relationships    Social connections     Talks on phone: None     Gets together: None     Attends Worship service: None     Active member of club or organization: None     Attends meetings of clubs or organizations: None     Relationship status: None    Intimate partner violence     Fear of current or ex partner: None     Emotionally abused: None     Physically abused: None     Forced sexual activity: None   Other Topics Concern    None   Social History Narrative    None       No Known Allergies    Current Outpatient Medications   Medication Sig Dispense Refill    ezetimibe-simvastatin (VYTORIN) 10-20 MG per tablet TAKE 1 TABLET BY MOUTH NIGHTLY 30 tablet 5    hydroCHLOROthiazide (HYDRODIURIL) 25 MG tablet TAKE 1 TABLET BY MOUTH DAILY 90 tablet 2    allopurinol (ZYLOPRIM) 100 MG tablet TAKE 1 TABLET BY MOUTH DAILY 30 tablet 5    metoprolol tartrate (LOPRESSOR) 50 MG tablet TAKE 1 TABLET BY MOUTH TWICE DAILY 180 tablet 1    terazosin (HYTRIN) 10 MG capsule TAKE 1 CAPSULE BY MOUTH DAILY AT BEDTIME 90 capsule 2 Physical Examination:    /84 (Site: Right Upper Arm, Position: Sitting, Cuff Size: Medium Adult)   Pulse 62   Resp 22   Ht 5' 4\" (1.626 m)   Wt 234 lb 12.8 oz (106.5 kg)   SpO2 98%   BMI 40.30 kg/m²    Physical Exam   Constitutional: He appears healthy. No distress. HENT:   Nose: Nose normal.   Mouth/Throat: Dentition is normal. Oropharynx is clear. Eyes: Pupils are equal, round, and reactive to light. Conjunctivae are normal.   Neck: Normal range of motion and thyroid normal. Neck supple. Cardiovascular: Regular rhythm, S1 normal, S2 normal, normal heart sounds, intact distal pulses and normal pulses. PMI is not displaced. No murmur heard. Pulmonary/Chest: He has no wheezes. He has no rales. He exhibits no tenderness. Abdominal: Soft. Bowel sounds are normal. He exhibits no distension and no mass. There is no splenomegaly or hepatomegaly. There is no abdominal tenderness. No hernia. Neurological: He is alert and oriented to person, place, and time. He has normal motor skills. Gait normal.   Skin: Skin is warm and dry. No cyanosis. No jaundice. Nails show no clubbing. Patient Active Problem List   Diagnosis    GERD (gastroesophageal reflux disease)    Hyperlipidemia    Hypertension    JOSE (obstructive sleep apnea)    Osteoarthritis    Kidney stones    Benign prostatic hyperplasia    Cataract    Malignant basal cell neoplasm of skin    Cellulitis    Rectus diastasis    Chest pain    Palpitations    ARRIAGA (dyspnea on exertion)    Extrasystole    Hyperuricemia    Prediabetes    Morbidly obese (HCC)           No orders of the defined types were placed in this encounter. No orders of the defined types were placed in this encounter. Assessment/Orders:       ICD-10-CM    1. Essential hypertension  I10    2. Palpitations  R00.2    3. Benign prostatic hyperplasia without lower urinary tract symptoms  N40.0    4.  ARRIAGA (dyspnea on exertion)  R06.00

## 2021-01-20 ASSESSMENT — ENCOUNTER SYMPTOMS
FACIAL SWELLING: 0
COLOR CHANGE: 0
VOICE CHANGE: 0
WHEEZING: 0
ABDOMINAL DISTENTION: 0
ANAL BLEEDING: 0
TROUBLE SWALLOWING: 0

## 2021-01-25 NOTE — PROGRESS NOTES
Chief Complaint   Patient presents with    Hyperlipidemia     6 month        HPI:  Duane Sorrow is a 76 y.o.     6 month f/u visit. Needs refills. 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      Patient Active Problem List   Diagnoses    GERD (gastroesophageal reflux disease) - controlled at this time    Hyperlipidemia - taking meds without incident, active, watching diet    Hypertension - controlled, normal stress in past few years    JOSE (obstructive sleep apnea) - refused sleep study     Osteoarthritis - general aches and pains - were better with CoQ10    Kidney stones - no attacks    BPH (benign prostatic hypertrophy) - sees urology annually    Cataract - he is following with ophthalmology    Hyperuricemia - no gout, only kidney stone in past,         Review of Systems:   General ROS: some fatigue on occ.    Respiratory ROS:no cough, shortness of breath, or wheezing  Cardiovascular ROS: occ palpitations, no chest pain or dyspnea on exertion  Gastrointestinal ROS: no blood in stool, occ constipation, lactulose on occasion, takes large BMs he states  Genito-Urinary ROS:occ trouble voiding, on hytrin, sees urology  Musculoskeletal ROS: knee pains, right knee mostly, hands hurt on occasion - takes occasional aleve/ibuprofen   Some right achilles pain, occ back pain, some left hip pain  Neurological ROS:has area of paresthesia on right hand, reports cervical radiculopathy symptoms on occasion negative for - behavioral changes, memory loss, numbness/tingling, tremors or weakness  Skin COVID-19

## 2021-01-29 ENCOUNTER — OFFICE VISIT (OUTPATIENT)
Dept: ORTHOPEDIC SURGERY | Age: 76
End: 2021-01-29
Payer: MEDICARE

## 2021-01-29 VITALS
BODY MASS INDEX: 39.95 KG/M2 | OXYGEN SATURATION: 97 % | WEIGHT: 234 LBS | TEMPERATURE: 96.4 F | HEART RATE: 63 BPM | HEIGHT: 64 IN

## 2021-01-29 DIAGNOSIS — M75.51 ACUTE BURSITIS OF RIGHT SHOULDER: Primary | ICD-10-CM

## 2021-01-29 PROCEDURE — 4040F PNEUMOC VAC/ADMIN/RCVD: CPT | Performed by: ORTHOPAEDIC SURGERY

## 2021-01-29 PROCEDURE — 3017F COLORECTAL CA SCREEN DOC REV: CPT | Performed by: ORTHOPAEDIC SURGERY

## 2021-01-29 PROCEDURE — 1036F TOBACCO NON-USER: CPT | Performed by: ORTHOPAEDIC SURGERY

## 2021-01-29 PROCEDURE — 20610 DRAIN/INJ JOINT/BURSA W/O US: CPT | Performed by: ORTHOPAEDIC SURGERY

## 2021-01-29 PROCEDURE — G8427 DOCREV CUR MEDS BY ELIG CLIN: HCPCS | Performed by: ORTHOPAEDIC SURGERY

## 2021-01-29 PROCEDURE — G8484 FLU IMMUNIZE NO ADMIN: HCPCS | Performed by: ORTHOPAEDIC SURGERY

## 2021-01-29 PROCEDURE — 1123F ACP DISCUSS/DSCN MKR DOCD: CPT | Performed by: ORTHOPAEDIC SURGERY

## 2021-01-29 PROCEDURE — G8417 CALC BMI ABV UP PARAM F/U: HCPCS | Performed by: ORTHOPAEDIC SURGERY

## 2021-01-29 PROCEDURE — 99203 OFFICE O/P NEW LOW 30 MIN: CPT | Performed by: ORTHOPAEDIC SURGERY

## 2021-01-29 RX ORDER — METHYLPREDNISOLONE ACETATE 80 MG/ML
80 INJECTION, SUSPENSION INTRA-ARTICULAR; INTRALESIONAL; INTRAMUSCULAR; SOFT TISSUE ONCE
Status: COMPLETED | OUTPATIENT
Start: 2021-01-29 | End: 2021-01-29

## 2021-01-29 RX ORDER — LIDOCAINE HYDROCHLORIDE 10 MG/ML
5 INJECTION, SOLUTION INFILTRATION; PERINEURAL ONCE
Status: COMPLETED | OUTPATIENT
Start: 2021-01-29 | End: 2021-01-29

## 2021-01-29 RX ADMIN — METHYLPREDNISOLONE ACETATE 80 MG: 80 INJECTION, SUSPENSION INTRA-ARTICULAR; INTRALESIONAL; INTRAMUSCULAR; SOFT TISSUE at 11:55

## 2021-01-29 RX ADMIN — LIDOCAINE HYDROCHLORIDE 5 ML: 10 INJECTION, SOLUTION INFILTRATION; PERINEURAL at 11:55

## 2021-01-29 NOTE — PROGRESS NOTES
Subjective:      Patient ID: Schuyler Cabot is a 76 y.o. male who presents today for:  Chief Complaint   Patient presents with    Shoulder Pain     pt here with c/o of right shoulder pain, pt states he has limited range of motion in the right shoulder and the left also bothers him, no injury to the shoulder. , no xrays       HPI    Patient comes in with chronic pain in his right shoulder. He has a little bit to some extent the left shoulder is been bothering enough that he like to have something done for it nonsurgical.  The patient has not had a previous work-up that I can appreciate of the shoulder in the recent months. Looking at his chart and reviewing the images the patient's had work-up in regards to multiple issues including his back and lumbar spine but nothing recently in regards to her shoulders. Past Medical History:   Diagnosis Date    Basal cell cancer     multiple,  follows with Dr. Violeta Ramirez (benign prostatic hypertrophy)     Cataract     ophthalmologist - Dr. Phuong Denise [facility]    Chest pain     Chronic kidney disease     ARRIAGA (dyspnea on exertion)     GERD (gastroesophageal reflux disease)     Hyperlipidemia     Hypertension     Hyperuricemia 9/5/2018    Kidney stones     HX of    Morbidly obese ([address] 3/5/2020    Normal cardiac stress test 1/2015    JOSE (obstructive sleep apnea)     Osteoarthritis     Palpitation     Rectus diastasis 9/10/2014     Past Surgical History:   Procedure Laterality Date    COLONOSCOPY  2005    COLONOSCOPY  9/20/13      [address] [address] EYE SURGERY  2012    cataracts    KNEE SURGERY      Left TKR    NOSE SURGERY      TONSILLECTOMY AND ADENOIDECTOMY       Social History     Socioeconomic History    Marital status:      Spouse name: Not on file    Number of children: Not on file    Years of education: Not on file    Highest education level: Not on file   Occupational History    Not on file   Social Needs  Financial resource strain: Not on file    Food insecurity     Worry: Not on file     Inability: Not on file   [place] [employer] needs     Medical: Not on file     Non-medical: Not on file   Tobacco Use    Smoking status: Never Smoker    Smokeless tobacco: Never Used   Substance and Sexual Activity    Alcohol use: No    Drug use: No    Sexual activity: Yes     Partners: Female   Lifestyle    Physical activity     Days per week: Not on file     Minutes per session: Not on file    Stress: Not on file   Relationships    Social connections     Talks on phone: Not on file     Gets together: Not on file     Attends Anabaptist service: Not on file     Active member of club or organization: Not on file     Attends meetings of clubs or organizations: Not on file     Relationship status: Not on file    Intimate partner violence     Fear of current or ex partner: Not on file     Emotionally abused: Not on file     Physically abused: Not on file     Forced sexual activity: Not on file   Other Topics Concern    Not on file   Social History Narrative    Not on file     Family History   Family history unknown: Yes     No Known Allergies  Current Outpatient Medications on File Prior to Visit   Medication Sig Dispense Refill    ezetimibe-simvastatin (VYTORIN) 10-20 MG per tablet TAKE 1 TABLET BY MOUTH NIGHTLY 30 tablet 5    hydroCHLOROthiazide (HYDRODIURIL) 25 MG tablet TAKE 1 TABLET BY MOUTH DAILY 90 tablet 2    allopurinol (ZYLOPRIM) 100 MG tablet TAKE 1 TABLET BY MOUTH DAILY 30 tablet 5    metoprolol tartrate (LOPRESSOR) 50 MG tablet TAKE 1 TABLET BY MOUTH TWICE DAILY 180 tablet 1    terazosin (HYTRIN) 10 MG capsule TAKE 1 CAPSULE BY MOUTH DAILY AT BEDTIME 90 capsule 2    Multiple Vitamins-Minerals (MULTIVITAMIN ADULTS 50+) TABS Take 1 tablet by mouth daily      omeprazole (PRILOSEC) 10 MG delayed release capsule Take 10 mg by mouth daily  ibuprofen (ADVIL;MOTRIN) 200 MG tablet Take 200 mg by mouth every 6 hours as needed for Pain      docusate sodium (COLACE) 100 MG capsule Take 100 mg by mouth 2 times daily      Cholecalciferol (VITAMIN D) 2000 units CAPS capsule Take by mouth      Coenzyme Q10 (CO Q 10 PO) Take  by mouth.  aspirin 325 MG tablet Take 325 mg by mouth daily. Current Facility-Administered Medications on File Prior to Visit   Medication Dose Route Frequency Provider Last Rate Last Admin    lidocaine-EPINEPHrine 1 percent-1:584300 injection 3 mL  3 mL Other Once [email or web address]              Review of Systems  No fever chills or night sweats. Objective:   Pulse 63   Temp 96.4 °F (35.8 °C) (Temporal)   Ht 5' 4\" (1.626 m)   Wt 234 lb (106.1 kg)   SpO2 97%   BMI 40.17 kg/m²     ORTHOEXAM    Physical examination demonstrates little crepitus in the shoulder. I would be surprised if he did not have a some degree of osteoarthritis. However at this time the patient with range of motion has little crepitus less pain but mostly pain against impingement testing. He has pain with overhead activities. Sensation is grossly intact that extremity. His elbow flexion extension wrist flexion is observed to be normal.  Assessment:       Diagnosis Orders   1.  Acute bursitis of right shoulder  [place] ARTHROCENTESIS ASPIR&/INJ MAJOR JT/BURSA W/O US    lidocaine 1 % injection 5 mL    methylPREDNISolone acetate (DEPO-MEDROL) injection 80 mg         Plan:      Orders Placed This Encounter   Procedures    [place] ARTHROCENTESIS ASPIR&/INJ MAJOR JT/BURSA W/O US     Orders Placed This Encounter   Medications    lidocaine 1 % injection 5 mL    methylPREDNISolone acetate (DEPO-MEDROL) injection 80 mg Although he probably has some some degree of arthritis at this point time we will get a try just given an injection is given some relief. If he gets relief with that no further work-up is necessary. If it is only temporary and works for several months and some it can be repeated. If is unsuccessful or the pain worsens I have given him the option of having further work-up which will include likely x-rays and an MRI. If that be the case and is continued pain on follow-up x-rays of the shoulder will be taken in 2 views. If he does get great improvement with the injection on the right and it stays great we can proceed with potential injection on the left. He is comfortable with the plan. Risks and benefits the injection of the shoulder were discussed. He wished to proceed in that manner. Understand those risks systemic and per the injection he wished to proceed. Injection is done with a cc of Depo-Medrol 80 mg mill with 4 cc lidocaine. He tolerated the injection well through a posterior approach I was easily able to get in the in the subacromial space which may communicate with the joint. Will be in 6 to 8 weeks. No follow-ups on file.       Chacha Combs MD

## 2021-02-16 RX ORDER — METOPROLOL TARTRATE 50 MG/1
TABLET, FILM COATED ORAL
Qty: 180 TABLET | Refills: 1 | Status: SHIPPED | OUTPATIENT
Start: 2021-02-16 | End: 2021-09-07

## 2021-03-08 ENCOUNTER — OFFICE VISIT (OUTPATIENT)
Dept: FAMILY MEDICINE CLINIC | Age: 76
End: 2021-03-08
Payer: MEDICARE

## 2021-03-08 VITALS
HEIGHT: 64 IN | DIASTOLIC BLOOD PRESSURE: 80 MMHG | BODY MASS INDEX: 39.95 KG/M2 | OXYGEN SATURATION: 97 % | HEART RATE: 50 BPM | TEMPERATURE: 98 F | SYSTOLIC BLOOD PRESSURE: 124 MMHG | WEIGHT: 234 LBS

## 2021-03-08 DIAGNOSIS — M19.90 OSTEOARTHRITIS, UNSPECIFIED OSTEOARTHRITIS TYPE, UNSPECIFIED SITE: ICD-10-CM

## 2021-03-08 DIAGNOSIS — Z12.5 SCREENING PSA (PROSTATE SPECIFIC ANTIGEN): ICD-10-CM

## 2021-03-08 DIAGNOSIS — N40.0 BENIGN PROSTATIC HYPERPLASIA WITHOUT LOWER URINARY TRACT SYMPTOMS: ICD-10-CM

## 2021-03-08 DIAGNOSIS — I10 ESSENTIAL HYPERTENSION: ICD-10-CM

## 2021-03-08 DIAGNOSIS — Z00.00 ROUTINE GENERAL MEDICAL EXAMINATION AT A HEALTH CARE FACILITY: Primary | ICD-10-CM

## 2021-03-08 DIAGNOSIS — E79.0 HYPERURICEMIA: ICD-10-CM

## 2021-03-08 DIAGNOSIS — R73.03 PREDIABETES: ICD-10-CM

## 2021-03-08 DIAGNOSIS — E78.5 HYPERLIPIDEMIA, UNSPECIFIED HYPERLIPIDEMIA TYPE: ICD-10-CM

## 2021-03-08 DIAGNOSIS — E66.01 MORBIDLY OBESE (HCC): ICD-10-CM

## 2021-03-08 DIAGNOSIS — G47.33 OSA (OBSTRUCTIVE SLEEP APNEA): ICD-10-CM

## 2021-03-08 LAB
ALBUMIN SERPL-MCNC: 4.2 G/DL (ref 3.5–4.6)
ALP BLD-CCNC: 75 U/L (ref 35–104)
ALT SERPL-CCNC: 36 U/L (ref 0–41)
ANION GAP SERPL CALCULATED.3IONS-SCNC: 18 MEQ/L (ref 9–15)
AST SERPL-CCNC: 27 U/L (ref 0–40)
BILIRUB SERPL-MCNC: 0.5 MG/DL (ref 0.2–0.7)
BUN BLDV-MCNC: 22 MG/DL (ref 8–23)
CALCIUM SERPL-MCNC: 9.4 MG/DL (ref 8.5–9.9)
CHLORIDE BLD-SCNC: 103 MEQ/L (ref 95–107)
CHOLESTEROL, TOTAL: 129 MG/DL (ref 0–199)
CO2: 21 MEQ/L (ref 20–31)
CREAT SERPL-MCNC: 1.41 MG/DL (ref 0.7–1.2)
GFR AFRICAN AMERICAN: 59.2
GFR NON-AFRICAN AMERICAN: 48.9
GLOBULIN: 3 G/DL (ref 2.3–3.5)
GLUCOSE BLD-MCNC: 121 MG/DL (ref 70–99)
HCT VFR BLD CALC: 47.9 % (ref 42–52)
HDLC SERPL-MCNC: 40 MG/DL (ref 40–59)
HEMOGLOBIN: 16.1 G/DL (ref 14–18)
LDL CHOLESTEROL CALCULATED: 48 MG/DL (ref 0–129)
MCH RBC QN AUTO: 31.5 PG (ref 27–31.3)
MCHC RBC AUTO-ENTMCNC: 33.6 % (ref 33–37)
MCV RBC AUTO: 93.7 FL (ref 80–100)
PDW BLD-RTO: 13.7 % (ref 11.5–14.5)
PLATELET # BLD: 171 K/UL (ref 130–400)
POTASSIUM SERPL-SCNC: 4 MEQ/L (ref 3.4–4.9)
PROSTATE SPECIFIC ANTIGEN: 0.6 NG/ML (ref 0–6.22)
RBC # BLD: 5.11 M/UL (ref 4.7–6.1)
SODIUM BLD-SCNC: 142 MEQ/L (ref 135–144)
TOTAL PROTEIN: 7.2 G/DL (ref 6.3–8)
TRIGL SERPL-MCNC: 206 MG/DL (ref 0–150)
URIC ACID, SERUM: 8.1 MG/DL (ref 3.4–7)
WBC # BLD: 7.3 K/UL (ref 4.8–10.8)

## 2021-03-08 PROCEDURE — 1123F ACP DISCUSS/DSCN MKR DOCD: CPT | Performed by: FAMILY MEDICINE

## 2021-03-08 PROCEDURE — 3017F COLORECTAL CA SCREEN DOC REV: CPT | Performed by: FAMILY MEDICINE

## 2021-03-08 PROCEDURE — G0439 PPPS, SUBSEQ VISIT: HCPCS | Performed by: FAMILY MEDICINE

## 2021-03-08 PROCEDURE — G8484 FLU IMMUNIZE NO ADMIN: HCPCS | Performed by: FAMILY MEDICINE

## 2021-03-08 PROCEDURE — 4040F PNEUMOC VAC/ADMIN/RCVD: CPT | Performed by: FAMILY MEDICINE

## 2021-03-08 SDOH — ECONOMIC STABILITY: FOOD INSECURITY: WITHIN THE PAST 12 MONTHS, YOU WORRIED THAT YOUR FOOD WOULD RUN OUT BEFORE YOU GOT MONEY TO BUY MORE.: NEVER TRUE

## 2021-03-08 SDOH — ECONOMIC STABILITY: TRANSPORTATION INSECURITY
IN THE PAST 12 MONTHS, HAS LACK OF TRANSPORTATION KEPT YOU FROM MEETINGS, WORK, OR FROM GETTING THINGS NEEDED FOR DAILY LIVING?: NO

## 2021-03-08 SDOH — ECONOMIC STABILITY: TRANSPORTATION INSECURITY
IN THE PAST 12 MONTHS, HAS THE LACK OF TRANSPORTATION KEPT YOU FROM MEDICAL APPOINTMENTS OR FROM GETTING MEDICATIONS?: NO

## 2021-03-08 ASSESSMENT — LIFESTYLE VARIABLES: HOW OFTEN DO YOU HAVE A DRINK CONTAINING ALCOHOL: 0

## 2021-03-08 ASSESSMENT — PATIENT HEALTH QUESTIONNAIRE - PHQ9
SUM OF ALL RESPONSES TO PHQ QUESTIONS 1-9: 0
SUM OF ALL RESPONSES TO PHQ9 QUESTIONS 1 & 2: 0
2. FEELING DOWN, DEPRESSED OR HOPELESS: 0
1. LITTLE INTEREST OR PLEASURE IN DOING THINGS: 0

## 2021-03-08 NOTE — PATIENT INSTRUCTIONS
Personalized Preventive Plan for [place]. - 3/8/2021  Medicare offers a range of preventive health benefits. Some of the tests and screenings are paid in full while other may be subject to a deductible, co-insurance, and/or copay. Some of these benefits include a comprehensive review of your medical history including lifestyle, illnesses that may run in your family, and various assessments and screenings as appropriate. After reviewing your medical record and screening and assessments performed today your provider may have ordered immunizations, labs, imaging, and/or referrals for you. A list of these orders (if applicable) as well as your Preventive Care list are included within your After Visit Summary for your review. Other Preventive Recommendations:    · A preventive eye exam performed by an eye specialist is recommended every 1-2 years to screen for glaucoma; cataracts, macular degeneration, and other eye disorders. · A preventive dental visit is recommended every 6 months. · Try to get at least 150 minutes of exercise per week or 10,000 steps per day on a pedometer . · Order or download the FREE \"Exercise & Physical Activity: Your Everyday Guide\" from [employer]. Call 7-134.761.3250 or search [employer] online. · You need 2556-6106 mg of calcium and 6153-6239 IU of vitamin D per day. It is possible to meet your calcium requirement with diet alone, but a vitamin D supplement is usually necessary to meet this goal.  · When exposed to the sun, use a sunscreen that protects against both UVA and UVB radiation with an SPF of 30 or greater. Reapply every 2 to 3 hours or after sweating, drying off with a towel, or swimming. · Always wear a seat belt when traveling in a car. Always wear a helmet when riding a bicycle or motorcycle.

## 2021-03-08 NOTE — PROGRESS NOTES
Medicare Annual Wellness Visit  Name: Paloma Simmons [id number] Date: 3/8/2021   MRN: [id number] Sex: Male   Age: 76 y.o. Ethnicity: Non-/Non    : 1945 Race: Shawna Marquez. is here for Medicare AWV    Screenings for behavioral, psychosocial and functional/safety risks, and cognitive dysfunction are all negative except as indicated below. These results, as well as other patient data from the [address] form, are documented in Flowsheets linked to this Encounter. Nothing acute    Chronic issues stable    Had both Pfizer shots at [facility]    Patient Active Problem List   Diagnosis    GERD (gastroesophageal reflux disease)    Hyperlipidemia    Hypertension    JOSE (obstructive sleep apnea)    Osteoarthritis    Kidney stones    Benign prostatic hyperplasia    Cataract    Malignant basal cell neoplasm of skin    Cellulitis    Rectus diastasis    Chest pain    Palpitations    ARRIAGA (dyspnea on exertion)    Extrasystole    Hyperuricemia    Prediabetes    Morbidly obese (HCC)    Acute bursitis of right shoulder         No Known Allergies      Prior to Visit Medications    Medication Sig Taking?  Authorizing Provider   metoprolol tartrate (LOPRESSOR) 50 MG tablet TAKE 1 TABLET BY MOUTH TWICE DAILY Yes Ariana Augustin MD   ezetimibe-simvastatin (VYTORIN) 10-20 MG per tablet TAKE 1 TABLET BY MOUTH NIGHTLY Yes Ariana Augustin MD   hydroCHLOROthiazide (HYDRODIURIL) 25 MG tablet TAKE 1 TABLET BY MOUTH DAILY Yes Ariana Augustin MD   allopurinol (ZYLOPRIM) 100 MG tablet TAKE 1 TABLET BY MOUTH DAILY Yes Ariana Augustin MD   terazosin (HYTRIN) 10 MG capsule TAKE 1 CAPSULE BY MOUTH DAILY AT BEDTIME Yes Ariana Augustin MD   omeprazole (PRILOSEC) 10 MG delayed release capsule Take 10 mg by mouth daily Yes Historical Provider, MD   ibuprofen (ADVIL;MOTRIN) 200 MG tablet Take 200 mg by mouth every 6 hours as needed for Pain Yes Historical Provider, MD   docusate sodium (COLACE) 100 MG capsule Take 100 mg by mouth 2 times daily Yes Historical Provider, MD   Cholecalciferol (VITAMIN D) 2000 units CAPS capsule Take by mouth Yes Historical Provider, MD   Coenzyme Q10 (CO Q 10 PO) Take  by mouth. Yes Historical Provider, MD   aspirin 325 MG tablet Take 325 mg by mouth daily. Yes Historical Provider, MD   Multiple Vitamins-Minerals (MULTIVITAMIN ADULTS 50+) TABS Take 1 tablet by mouth daily  Historical Provider, MD         Past Medical History:   Diagnosis Date    Basal cell cancer     multiple,  follows with Dr. Serena Weaver (benign prostatic hypertrophy)     Cataract     ophthalmologist - Dr. Kermit Magana [facility]    Chest pain     Chronic kidney disease     ARRIAGA (dyspnea on exertion)     GERD (gastroesophageal reflux disease)     Hyperlipidemia     Hypertension     Hyperuricemia 9/5/2018    Kidney stones     HX of    Morbidly obese ([address] 3/5/2020    Normal cardiac stress test 1/2015    JOSE (obstructive sleep apnea)     Osteoarthritis     Palpitation     Rectus diastasis 9/10/2014       Past Surgical History:   Procedure Laterality Date    COLONOSCOPY  2005    COLONOSCOPY  9/20/13      [address] EYE SURGERY  2012    cataracts    KNEE SURGERY      Left TKR    NOSE SURGERY      TONSILLECTOMY AND ADENOIDECTOMY           Family History   Family history unknown: Yes       CareTeam (Including outside providers/suppliers regularly involved in providing care):   Patient Care Team:  Vanessa Meyer MD as PCP - General (Family Medicine)  Vanessa Meyer MD as PCP - REHABILITATION [facility] Empaneled Provider  Cricket Reyna MD as Physician (Cardiology)  Janet Smart MD as Consulting Physician (Gastroenterology)    Wt Readings from Last 3 Encounters:   03/08/21 234 lb (106.1 kg)   01/29/21 234 lb (106.1 kg)   01/19/21 234 lb 12.8 oz (106.5 kg)     Vitals:    03/08/21 1004   BP: 124/80   Site: Left Upper Arm   Pulse: 50   Temp: 98 °F (36.7 °C)   SpO2: 97%   Weight: 234 lb (106.1 kg) Height: 5' 4\" (1.626 m)     Body mass index is 40.17 kg/m². Based upon direct observation of the patient, evaluation of cognition reveals recent and remote memory intact. Physical Exam:  /80 (Site: Left Upper Arm)   Pulse 50   Temp 98 °F (36.7 °C)   Ht 5' 4\" (1.626 m)   Wt 234 lb (106.1 kg)   SpO2 97%   BMI 40.17 kg/m²     Gen: Well, NAD, Alert, Oriented x 3   HEENT: EOMI, eyes clear, MMM  Skin: without rash or jaundice  Neck: no significant lymphadenopathy or thyromegaly  Lungs: CTA B w/out Rales/Wheezes/Rhonchi, Good respiratory effort   Heart: RRR, S1S2, w/out M/R/G, non-displaced PMI   Ext: No C/C/E Bilaterally. Neuro: Neurovascularly intact w/ Sensory/Motor intact UE/LE Bilaterally. Patient's complete Health Risk Assessment and screening values have been reviewed and are found in Flowsheets. The following problems were reviewed today and where indicated follow up appointments were made and/or referrals ordered.     Positive Risk Factor Screenings with Interventions:     Health Habits/Nutrition:  Health Habits/Nutrition  Do you exercise for at least 20 minutes 2-3 times per week?: Yes  Have you lost any weight without trying in the past 3 months?: No  Do you eat only one meal per day?: (!) Yes  Have you seen the dentist within the past year?: Yes  Body mass index: (!) 40.16  Health Habits/Nutrition Interventions:  · weight loss always advised   Safety Interventions:  · Home safety tips provided    Personalized Preventive Plan   Current Health Maintenance Status  Immunization History   Administered Date(s) Administered    COVID-19, NileGuide, PF, 30mcg/0.3mL 01/25/2021, 02/19/2021    Influenza 10/12/2012    Influenza Virus Vaccine 09/22/2014    Influenza, High Dose (Fluzone 65 yrs and older) 09/10/2015, 09/08/2016, 09/05/2017, 09/05/2018    Influenza, Quadv, adjuvanted, 65 yrs +, IM, PF (Fluad) 10/23/2020    Influenza, Triv, inactivated, subunit, adjuvanted, IM (Fluad 65 yrs and older) 09/05/2019    PPD Test 07/08/2002, 07/01/2003, 07/07/2004, 07/06/2005    Pneumococcal Conjugate 13-valent ([id number]) 09/10/2015    Pneumococcal Polysaccharide ([id number]) 09/01/2011    Tdap (Boostrix, Adacel) 09/01/2011    Zoster Recombinant (Shingrix) 10/16/2019, 01/17/2020        Health Maintenance   Topic Date Due    Annual Wellness Visit (AWV)  Never done    DTaP/Tdap/Td vaccine (2 - Td) 09/01/2021    A1C test (Diabetic or Prediabetic)  09/04/2021    Lipid screen  09/04/2021    Potassium monitoring  09/04/2021    Creatinine monitoring  09/04/2021    Colon cancer screen colonoscopy  09/20/2023    Flu vaccine  Completed    Shingles Vaccine  Completed    Pneumococcal 65+ years Vaccine  Completed    COVID-19 Vaccine  Completed    Hepatitis C screen  Completed    Hepatitis A vaccine  Aged Out    Hepatitis B vaccine  Aged Out    Hib vaccine  Aged Out    Meningococcal (ACWY) vaccine  Aged Out     Recommendations for [employer] Due: see orders and patient instructions/AVS.  . Recommended screening schedule for the next 5-10 years is provided to the patient in written form: see Patient Manuel Grant was seen today for medicare awv. Diagnoses and all orders for this visit:    Routine general medical examination at a health care facility    Morbidly obese [facility])    Essential hypertension  -     CBC; Future  -     Comprehensive Metabolic Panel; Future  -     Lipid Panel; Future    Hyperuricemia  -     Uric Acid; Future    Prediabetes    Hyperlipidemia, unspecified hyperlipidemia type    JOSE (obstructive sleep apnea)    Benign prostatic hyperplasia without lower urinary tract symptoms    Osteoarthritis, unspecified osteoarthritis type, unspecified site    Screening PSA (prostate specific antigen)  -     PSA Screening;  Future          Walking for exercise    Chronic conditions are stable  Continue current regimen  Follow up with appropriate specialists and here

## 2021-03-19 ENCOUNTER — OFFICE VISIT (OUTPATIENT)
Dept: ORTHOPEDIC SURGERY | Age: 76
End: 2021-03-19
Payer: MEDICARE

## 2021-03-19 VITALS
HEIGHT: 64 IN | WEIGHT: 234 LBS | OXYGEN SATURATION: 94 % | HEART RATE: 48 BPM | BODY MASS INDEX: 39.95 KG/M2 | TEMPERATURE: 97.1 F

## 2021-03-19 DIAGNOSIS — M75.52 ACUTE BURSITIS OF LEFT SHOULDER: Primary | ICD-10-CM

## 2021-03-19 DIAGNOSIS — M75.51 ACUTE BURSITIS OF RIGHT SHOULDER: ICD-10-CM

## 2021-03-19 PROCEDURE — G8484 FLU IMMUNIZE NO ADMIN: HCPCS | Performed by: ORTHOPAEDIC SURGERY

## 2021-03-19 PROCEDURE — G8427 DOCREV CUR MEDS BY ELIG CLIN: HCPCS | Performed by: ORTHOPAEDIC SURGERY

## 2021-03-19 PROCEDURE — G8417 CALC BMI ABV UP PARAM F/U: HCPCS | Performed by: ORTHOPAEDIC SURGERY

## 2021-03-19 PROCEDURE — 1123F ACP DISCUSS/DSCN MKR DOCD: CPT | Performed by: ORTHOPAEDIC SURGERY

## 2021-03-19 PROCEDURE — 1036F TOBACCO NON-USER: CPT | Performed by: ORTHOPAEDIC SURGERY

## 2021-03-19 PROCEDURE — 4040F PNEUMOC VAC/ADMIN/RCVD: CPT | Performed by: ORTHOPAEDIC SURGERY

## 2021-03-19 PROCEDURE — 3017F COLORECTAL CA SCREEN DOC REV: CPT | Performed by: ORTHOPAEDIC SURGERY

## 2021-03-19 PROCEDURE — 99213 OFFICE O/P EST LOW 20 MIN: CPT | Performed by: ORTHOPAEDIC SURGERY

## 2021-03-19 PROCEDURE — 20610 DRAIN/INJ JOINT/BURSA W/O US: CPT | Performed by: ORTHOPAEDIC SURGERY

## 2021-03-19 RX ORDER — FINASTERIDE 5 MG/1
TABLET, FILM COATED ORAL
COMMUNITY
Start: 2021-03-06 | End: 2021-05-20

## 2021-03-19 RX ORDER — METHYLPREDNISOLONE ACETATE 80 MG/ML
80 INJECTION, SUSPENSION INTRA-ARTICULAR; INTRALESIONAL; INTRAMUSCULAR; SOFT TISSUE ONCE
Status: COMPLETED | OUTPATIENT
Start: 2021-03-19 | End: 2021-03-19

## 2021-03-19 RX ORDER — LIDOCAINE HYDROCHLORIDE 10 MG/ML
5 INJECTION, SOLUTION INFILTRATION; PERINEURAL ONCE
Status: COMPLETED | OUTPATIENT
Start: 2021-03-19 | End: 2021-03-19

## 2021-03-19 RX ADMIN — METHYLPREDNISOLONE ACETATE 80 MG: 80 INJECTION, SUSPENSION INTRA-ARTICULAR; INTRALESIONAL; INTRAMUSCULAR; SOFT TISSUE at 11:25

## 2021-03-19 RX ADMIN — LIDOCAINE HYDROCHLORIDE 5 ML: 10 INJECTION, SOLUTION INFILTRATION; PERINEURAL at 11:22

## 2021-03-19 NOTE — PROGRESS NOTES
Subjective:      Patient ID: Sonido Buck is a 76 y.o. male who presents today for:  Chief Complaint   Patient presents with    Follow-up     6 wk f/u bursitis of right shoulder, pt says that shoulder feels pretty good; injection helped and he noticed a decrease in the pain in his neck first, still has a little discomfort       HPI    Patient presents status post a bursitis diagnosis on the right shoulder. He also has a little bit on the left as well. On the last time the right side bothers her somewhat she became an injection there. That shot helped his shoulder and his neck and he is very happy with that in fact his right shoulder feels so much better that now his left shoulder feels in relationship significantly more painful. He did so well with the right that he like to consider an injection on the left today. He has no trauma to the area as the pain has been chronic in nature bilaterally. No films are taken today are updated. Past Medical History:   Diagnosis Date    Basal cell cancer     multiple,  follows with Dr. Dread Craft (benign prostatic hypertrophy)     Cataract     ophthalmologist - Dr. Ross Render [facility]    Chest pain     Chronic kidney disease     ARRIAGA (dyspnea on exertion)     GERD (gastroesophageal reflux disease)     Hyperlipidemia     Hypertension     Hyperuricemia 9/5/2018    Kidney stones     HX of    Morbidly obese ([address] 3/5/2020    Normal cardiac stress test 1/2015    JOSE (obstructive sleep apnea)     Osteoarthritis     Palpitation     Rectus diastasis 9/10/2014     Past Surgical History:   Procedure Laterality Date    COLONOSCOPY  2005    COLONOSCOPY  9/20/13    DR. Moreno [facility] SURGERY  2012    cataracts    KNEE SURGERY      Left TKR    NOSE SURGERY      TONSILLECTOMY AND ADENOIDECTOMY       Social History     Socioeconomic History    Marital status:      Spouse name: Not on file    Number of children: Not on file    Years of education: Not on file    Highest education level: Not on file   Occupational History    Not on file   Social Needs    Financial resource strain: Not hard at all    Food insecurity     Worry: Never true     Inability: Never true   [employer] needs     Medical: No     Non-medical: No   Tobacco Use    Smoking status: Never Smoker    Smokeless tobacco: Never Used   Substance and Sexual Activity    Alcohol use: No    Drug use: No    Sexual activity: Yes     Partners: Female   Lifestyle    Physical activity     Days per week: Not on file     Minutes per session: Not on file    Stress: Not on file   Relationships    Social connections     Talks on phone: Not on file     Gets together: Not on file     Attends Restorationism service: Not on file     Active member of club or organization: Not on file     Attends meetings of clubs or organizations: Not on file     Relationship status: Not on file    Intimate partner violence     Fear of current or ex partner: Not on file     Emotionally abused: Not on file     Physically abused: Not on file     Forced sexual activity: Not on file   Other Topics Concern    Not on file   Social History Narrative    Not on file     Family History   Family history unknown: Yes     No Known Allergies  Current Outpatient Medications on File Prior to Visit   Medication Sig Dispense Refill    finasteride (PROSCAR) 5 MG tablet Take 1 Tablet by mouth daily.       metoprolol tartrate (LOPRESSOR) 50 MG tablet TAKE 1 TABLET BY MOUTH TWICE DAILY 180 tablet 1    ezetimibe-simvastatin (VYTORIN) 10-20 MG per tablet TAKE 1 TABLET BY MOUTH NIGHTLY 30 tablet 5    hydroCHLOROthiazide (HYDRODIURIL) 25 MG tablet TAKE 1 TABLET BY MOUTH DAILY 90 tablet 2    allopurinol (ZYLOPRIM) 100 MG tablet TAKE 1 TABLET BY MOUTH DAILY 30 tablet 5    terazosin (HYTRIN) 10 MG capsule TAKE 1 CAPSULE BY MOUTH DAILY AT BEDTIME 90 capsule 2    Multiple Vitamins-Minerals (MULTIVITAMIN ADULTS 50+) TABS Take 1 tablet by mouth daily      omeprazole (PRILOSEC) 10 MG delayed release capsule Take 10 mg by mouth daily      ibuprofen (ADVIL;MOTRIN) 200 MG tablet Take 200 mg by mouth every 6 hours as needed for Pain      docusate sodium (COLACE) 100 MG capsule Take 100 mg by mouth 2 times daily      Cholecalciferol (VITAMIN D) 2000 units CAPS capsule Take by mouth      Coenzyme Q10 (CO Q 10 PO) Take  by mouth.  aspirin 325 MG tablet Take 325 mg by mouth daily. Current Facility-Administered Medications on File Prior to Visit   Medication Dose Route Frequency Provider Last Rate Last Admin    lidocaine-EPINEPHrine 1 percent-1:066156 injection 3 mL  3 mL Other Once Gene Mota PA-C              Review of Systems  No change in review of systems no fever chills or night sweats noted today. Objective:   Pulse (!) 48   Temp 97.1 °F (36.2 °C) (Temporal)   Ht 5' 4\" (1.626 m)   Wt 234 lb (106.1 kg)   SpO2 94%   BMI 40.17 kg/m²     ORTHOEXAM    Patient has crepitus in both shoulders pain with over shoulder activities. A little pain extending to the neck is elbow flexion extension wrist flexion extension bilaterally are intact he sensory intact bilaterally as well. His left shoulder actually bothers him more with impingement testing today than his right. Patient has reasonable neck range of motion despite no doubt cervical spondylolysis. Assessment:       Diagnosis Orders   1. Acute bursitis of left shoulder  [place] ARTHROCENTESIS ASPIR&/INJ MAJOR JT/BURSA W/O US    methylPREDNISolone acetate (DEPO-MEDROL) injection 80 mg    lidocaine 1 % injection 5 mL   2. Acute bursitis of right shoulder           Plan:   The left side bothers him much more in the right and therefore I discussed with him given the great relief he had in the right we could proceed with a steroid injection on the left. He understands risks invariant and benefits inherent to such and wished to proceed in that manner.     Risks and benefits of a shoulder injection were discussed with the patient in detail. These risks include issues with hypertension systemically or blood glucose levels in the immediate. Patient also may have immediate pain over the next several days before the corticosteroid becomes effective. Understanding these risks the patient wished to proceed. Under Betadine alcohol prep using a 22-gauge needle a cc of Depo-Medrol 80 mg mill with 4 cc of lidocaine was infiltrated in the subacromial space posterior approach. Patient tolerated injection well without complication. A Band-Aid was applied and the shoulder was ranged. Follow-up in 2 months to see how he responds on the left and potentially be a candidate for repeat injections on the right only if necessary. He is comfortable with the plan and tolerated the injection well today. Orders Placed This Encounter   Procedures    [place] ARTHROCENTESIS ASPIR&/INJ MAJOR JT/BURSA W/O US     Orders Placed This Encounter   Medications    methylPREDNISolone acetate (DEPO-MEDROL) injection 80 mg    lidocaine 1 % injection 5 mL       No follow-ups on file.       Early Kussmaul, MD

## 2021-04-01 RX ORDER — ALLOPURINOL 100 MG/1
TABLET ORAL
Qty: 30 TABLET | Refills: 5 | Status: SHIPPED | OUTPATIENT
Start: 2021-04-01 | End: 2021-10-04

## 2021-05-20 ENCOUNTER — HOSPITAL ENCOUNTER (OUTPATIENT)
Dept: ORTHOPEDIC SURGERY | Age: 76
Discharge: HOME | End: 2021-05-22
Payer: MEDICARE

## 2021-05-20 ENCOUNTER — OFFICE VISIT (OUTPATIENT)
Dept: ORTHOPEDIC SURGERY | Age: 76
End: 2021-05-20
Payer: MEDICARE

## 2021-05-20 VITALS
TEMPERATURE: 97.8 F | HEIGHT: 64 IN | OXYGEN SATURATION: 98 % | BODY MASS INDEX: 39.95 KG/M2 | HEART RATE: 69 BPM | WEIGHT: 234 LBS

## 2021-05-20 DIAGNOSIS — M75.52 ACUTE BURSITIS OF LEFT SHOULDER: ICD-10-CM

## 2021-05-20 DIAGNOSIS — M75.51 ACUTE BURSITIS OF RIGHT SHOULDER: ICD-10-CM

## 2021-05-20 DIAGNOSIS — M75.51 ACUTE BURSITIS OF RIGHT SHOULDER: Primary | ICD-10-CM

## 2021-05-20 PROCEDURE — G8417 CALC BMI ABV UP PARAM F/U: HCPCS | Performed by: PHYSICIAN ASSISTANT

## 2021-05-20 PROCEDURE — 20610 DRAIN/INJ JOINT/BURSA W/O US: CPT | Performed by: PHYSICIAN ASSISTANT

## 2021-05-20 PROCEDURE — 73030 X-RAY EXAM OF SHOULDER: CPT

## 2021-05-20 PROCEDURE — 1123F ACP DISCUSS/DSCN MKR DOCD: CPT | Performed by: PHYSICIAN ASSISTANT

## 2021-05-20 PROCEDURE — 99214 OFFICE O/P EST MOD 30 MIN: CPT | Performed by: PHYSICIAN ASSISTANT

## 2021-05-20 PROCEDURE — G8427 DOCREV CUR MEDS BY ELIG CLIN: HCPCS | Performed by: PHYSICIAN ASSISTANT

## 2021-05-20 PROCEDURE — 4040F PNEUMOC VAC/ADMIN/RCVD: CPT | Performed by: PHYSICIAN ASSISTANT

## 2021-05-20 PROCEDURE — 1036F TOBACCO NON-USER: CPT | Performed by: PHYSICIAN ASSISTANT

## 2021-05-20 PROCEDURE — 73030 X-RAY EXAM OF SHOULDER: CPT | Performed by: ORTHOPAEDIC SURGERY

## 2021-05-20 RX ORDER — METHYLPREDNISOLONE ACETATE 80 MG/ML
80 INJECTION, SUSPENSION INTRA-ARTICULAR; INTRALESIONAL; INTRAMUSCULAR; SOFT TISSUE ONCE
Status: COMPLETED | OUTPATIENT
Start: 2021-05-20 | End: 2021-09-28

## 2021-05-20 RX ORDER — TERAZOSIN 10 MG/1
10 CAPSULE ORAL NIGHTLY
COMMUNITY
Start: 2021-04-07

## 2021-05-20 RX ORDER — LIDOCAINE HYDROCHLORIDE 10 MG/ML
5 INJECTION, SOLUTION INFILTRATION; PERINEURAL ONCE
Status: COMPLETED | OUTPATIENT
Start: 2021-05-20 | End: 2021-05-20

## 2021-05-20 RX ORDER — FINASTERIDE 5 MG/1
5 TABLET, FILM COATED ORAL DAILY
COMMUNITY
Start: 2021-04-07

## 2021-05-20 RX ADMIN — LIDOCAINE HYDROCHLORIDE 5 ML: 10 INJECTION, SOLUTION INFILTRATION; PERINEURAL at 13:18

## 2021-05-20 NOTE — PROGRESS NOTES
A timeout was performed immediately prior to the start of the Right Shoulder cortasone injection. procedure and included the correct patient (two identifiers), correct procedure and correct site(s). Procedure consent and allergies were also verified. Patient's name, date of birth, and allergies have been confirmed. Patient is aware that injection is to be given in Right upper extremity. Right shoulder) He/she is aware that they will be seeing Maira Persaud PA-C and the injection will be given by him.

## 2021-05-20 NOTE — PROGRESS NOTES
[facility]      Subjective:      Chief Complaint   Patient presents with    Follow-up     2 month follow up on Acute bursitis of left shoulder. He is pain having pain in both shoulder again. The pain in his neck is better which he feels may be from the injection. He feels the pain is in his deltoid bursa. HPI: Dana Nieves is a 68 y.o. male who is here for bilateral shoulder pain, left shoulder was injected 2 months ago. Still having some residual pain in the left side. Today the pain is worse on the right. There is no images of his shoulders. He declines to go to therapy, he is doing exercises at home, he is a retired podiatrist    Past Medical History:   Diagnosis Date    Basal cell cancer     multiple,  follows with Dr. Taryn Navarrete (benign prostatic hypertrophy)     Cataract     ophthalmologist - Dr. Hawa [facility]    Chest pain     Chronic kidney disease     ARRIAGA (dyspnea on exertion)     GERD (gastroesophageal reflux disease)     Hyperlipidemia     Hypertension     Hyperuricemia 9/5/2018    Kidney stones     HX of    Morbidly obese ([address] 3/5/2020    Normal cardiac stress test 1/2015    JOSE (obstructive sleep apnea)     Osteoarthritis     Palpitation     Rectus diastasis 9/10/2014      Past Surgical History:   Procedure Laterality Date    COLONOSCOPY  2005    COLONOSCOPY  9/20/13      [facility] SURGERY  2012    cataracts    KNEE SURGERY      Left TKR    NOSE SURGERY      TONSILLECTOMY AND ADENOIDECTOMY       Social History     Socioeconomic History    Marital status:      Spouse name: Not on file    Number of children: Not on file    Years of education: Not on file    Highest education level: Not on file   Occupational History    Not on file   Tobacco Use    Smoking status: Never Smoker    Smokeless tobacco: Never Used   Substance and Sexual Activity    Alcohol use: No    Drug use: No    Sexual activity: Yes     Partners: Female   Other Topics Concern    Not on file   Social History Narrative    Not on file     Social Determinants of Health     Financial Resource Strain: Low Risk     Difficulty of Paying Living Expenses: Not hard at all   Food Insecurity: No Food Insecurity    Worried About Running Out of Food in the Last Year: Never true    [address] in the Last Year: Never true   Transportation Needs: No Transportation Needs    Lack of Transportation (Medical): No    Lack of Transportation (Non-Medical):  No   Physical Activity:     Days of Exercise per Week:     Minutes of Exercise per Session:    Stress:     Feeling of Stress :    Social Connections:     Frequency of Communication with Friends and Family:     Frequency of Social Gatherings with Friends and Family:     Attends Catholic Services:     Active Member of Clubs or Organizations:     Attends Club or Organization Meetings:     Marital Status:    Intimate Partner Violence:     Fear of Current or Ex-Partner:     Emotionally Abused:     Physically Abused:     Sexually Abused:      Family History   Family history unknown: Yes     No Known Allergies  Current Outpatient Medications on File Prior to Visit   Medication Sig Dispense Refill    finasteride (PROSCAR) 5 MG tablet Take 5 mg by mouth daily      terazosin (HYTRIN) 10 MG capsule Take 10 mg by mouth nightly      allopurinol (ZYLOPRIM) 100 MG tablet TAKE 1 TABLET BY MOUTH DAILY 30 tablet 5    metoprolol tartrate (LOPRESSOR) 50 MG tablet TAKE 1 TABLET BY MOUTH TWICE DAILY 180 tablet 1    ezetimibe-simvastatin (VYTORIN) 10-20 MG per tablet TAKE 1 TABLET BY MOUTH NIGHTLY 30 tablet 5    hydroCHLOROthiazide (HYDRODIURIL) 25 MG tablet TAKE 1 TABLET BY MOUTH DAILY 90 tablet 2    Multiple Vitamins-Minerals (MULTIVITAMIN ADULTS 50+) TABS Take 1 tablet by mouth daily      omeprazole (PRILOSEC) 10 MG delayed release capsule Take 10 mg by mouth daily      ibuprofen (ADVIL;MOTRIN) 200 MG tablet Take 200 mg by mouth every 6 hours as needed for Pain      docusate sodium (COLACE) 100 MG capsule Take 100 mg by mouth 2 times daily      Cholecalciferol (VITAMIN D) 2000 units CAPS capsule Take by mouth      Coenzyme Q10 (CO Q 10 PO) Take  by mouth.  aspirin 325 MG tablet Take 325 mg by mouth daily. Current Facility-Administered Medications on File Prior to Visit   Medication Dose Route Frequency Provider Last Rate Last Admin    lidocaine-EPINEPHrine 1 percent-1:168412 injection 3 mL  3 mL Other [facility]           Objective:   Pulse 69   Temp 97.8 °F (36.6 °C) (Temporal)   Ht 5' 4\" (1.626 m)   Wt 234 lb (106.1 kg)   SpO2 98%   BMI 40.17 kg/m²       Radiographs and Laboratory Studies:   Previous diagnostic imaging studies were reviewed. Osteophtes inferiorly,. trype 2 acromion       Laboratory Studies:   Lab Results   Component Value Date    WBC 7.3 03/08/2021    HGB 16.1 03/08/2021    HCT 47.9 03/08/2021    MCV 93.7 03/08/2021     03/08/2021     No results found for: SEDRATE  No results found for: CRP    Assessment and Plan:      Diagnosis Orders   1. Acute bursitis of right shoulder  XR SHOULDER RIGHT (MIN 2 VIEWS)   2. Acute bursitis of left shoulder         Retired podiatrist   here for bilateral rotator cuff tendinopathy's. He was injected in the left side 2 months ago. This helped him but now his pain is starting to return. The right is bothering him more than the left. Because he is outside the time frame window for the left shoulder he cannot get an injection there. We can go ahead with the right side today. He understands the risks and benefits of this injection. He continues to decline therapy as he has been doing exercises on his own in his house. Not taking any anti-inflammatories nor does he want to. See him back in a month if he needs for his left shoulder for serial injection. He should get an x-ray at that visit. SHOULDER INJECTION:  Subacromial steroid injection of right shoulder: The risks, benefits, alternatives, procedure, and convalescence for corticosteroid injection of right shoulder subacromial bursa was discussed with patient. The risk discussed included, but were not limited to, infection, limited efficacy, blanching of the skin, localized atrophy of the adipose and muscle tissue, localized contusion and swelling, and persistent or recurrent discomfort. Patient verbalized understanding and agreed to proceed after all questions were answered. The patient was injected in seated position with the right upper arm adducted against the torso. The lateral and posterolateral aspects of the shoulder were prepped with Betadine solution. Topical cold spray was applied at the planned injection site. An injection consisting of 1 mL of Depo-Medrol and 2 mL of 1% lidocaine was administered from an entry point along posterior lateral extent of the acromion using a syringe and a 21-gauge needle directed toward the coracoid. Hemostasis was achieved using direct pressure, and the injection site was cleansed with alcohol pads and a bandage applied. This was well-tolerated by the patient, and the patient was neurovascularly intact following the procedure. The above plan was discussed in thorough detail with Dr. Lauren Roman and the patient. No orders of the defined types were placed in this encounter. No orders of the defined types were placed in this encounter. No follow-ups on file.     Yasmin Alfredo PA-C  [facility]  [phone]

## 2021-06-07 RX ORDER — EZETIMIBE AND SIMVASTATIN 10; 20 MG/1; MG/1
TABLET ORAL
Qty: 30 TABLET | Refills: 2 | Status: SHIPPED | OUTPATIENT
Start: 2021-06-07 | End: 2021-09-07

## 2021-07-07 ENCOUNTER — OFFICE VISIT (OUTPATIENT)
Dept: ORTHOPEDIC SURGERY | Age: 76
End: 2021-07-07
Payer: MEDICARE

## 2021-07-07 VITALS
OXYGEN SATURATION: 97 % | HEART RATE: 62 BPM | TEMPERATURE: 97.4 F | HEIGHT: 64 IN | WEIGHT: 234 LBS | BODY MASS INDEX: 39.95 KG/M2

## 2021-07-07 DIAGNOSIS — M75.52 ACUTE BURSITIS OF LEFT SHOULDER: ICD-10-CM

## 2021-07-07 DIAGNOSIS — M75.51 ACUTE BURSITIS OF RIGHT SHOULDER: Primary | ICD-10-CM

## 2021-07-07 PROCEDURE — 1123F ACP DISCUSS/DSCN MKR DOCD: CPT | Performed by: ORTHOPAEDIC SURGERY

## 2021-07-07 PROCEDURE — 99213 OFFICE O/P EST LOW 20 MIN: CPT | Performed by: ORTHOPAEDIC SURGERY

## 2021-07-07 PROCEDURE — 1036F TOBACCO NON-USER: CPT | Performed by: ORTHOPAEDIC SURGERY

## 2021-07-07 PROCEDURE — G8417 CALC BMI ABV UP PARAM F/U: HCPCS | Performed by: ORTHOPAEDIC SURGERY

## 2021-07-07 PROCEDURE — G8427 DOCREV CUR MEDS BY ELIG CLIN: HCPCS | Performed by: ORTHOPAEDIC SURGERY

## 2021-07-07 PROCEDURE — 4040F PNEUMOC VAC/ADMIN/RCVD: CPT | Performed by: ORTHOPAEDIC SURGERY

## 2021-07-07 NOTE — PROGRESS NOTES
History Narrative    Not on file     Social Determinants of Health     Financial Resource Strain: Low Risk     Difficulty of Paying Living Expenses: Not hard at all   Food Insecurity: No Food Insecurity    Worried About Running Out of Food in the Last Year: Never true    [address] in the Last Year: Never true   Transportation Needs: No Transportation Needs    Lack of Transportation (Medical): No    Lack of Transportation (Non-Medical):  No   Physical Activity:     Days of Exercise per Week:     Minutes of Exercise per Session:    Stress:     Feeling of Stress :    Social Connections:     Frequency of Communication with Friends and Family:     Frequency of Social Gatherings with Friends and Family:     Attends Orthodox Services:     Active Member of Clubs or Organizations:     Attends Club or Organization Meetings:     Marital Status:    Intimate Partner Violence:     Fear of Current or Ex-Partner:     Emotionally Abused:     Physically Abused:     Sexually Abused:      Family History   Family history unknown: Yes     No Known Allergies  Current Outpatient Medications on File Prior to Visit   Medication Sig Dispense Refill    ezetimibe-simvastatin (VYTORIN) 10-20 MG per tablet TAKE 1 TABLET BY MOUTH NIGHTLY 30 tablet 2    finasteride (PROSCAR) 5 MG tablet Take 5 mg by mouth daily      terazosin (HYTRIN) 10 MG capsule Take 10 mg by mouth nightly      allopurinol (ZYLOPRIM) 100 MG tablet TAKE 1 TABLET BY MOUTH DAILY 30 tablet 5    metoprolol tartrate (LOPRESSOR) 50 MG tablet TAKE 1 TABLET BY MOUTH TWICE DAILY 180 tablet 1    hydroCHLOROthiazide (HYDRODIURIL) 25 MG tablet TAKE 1 TABLET BY MOUTH DAILY 90 tablet 2    Multiple Vitamins-Minerals (MULTIVITAMIN ADULTS 50+) TABS Take 1 tablet by mouth daily      omeprazole (PRILOSEC) 10 MG delayed release capsule Take 10 mg by mouth daily      ibuprofen (ADVIL;MOTRIN) 200 MG tablet Take 200 mg by mouth every 6 hours as needed for Pain      docusate sodium (COLACE) 100 MG capsule Take 100 mg by mouth 2 times daily      Cholecalciferol (VITAMIN D) 2000 units CAPS capsule Take by mouth      Coenzyme Q10 (CO Q 10 PO) Take  by mouth.  aspirin 325 MG tablet Take 325 mg by mouth daily. Current Facility-Administered Medications on File Prior to Visit   Medication Dose Route Frequency Provider Last Rate Last Admin    lidocaine 1 % injection 5 mL  5 mL Intra-articular Once Sabiha Back, PA-C        methylPREDNISolone acetate (DEPO-MEDROL) injection 80 mg  80 mg Intra-articular Once Sabiha Back, PA-C        lidocaine-EPINEPHrine 1 percent-1:767685 injection 3 mL  3 mL Other Once Junita Dates, PA-C              Review of Systems  No fever chills night sweats. Objective:   Pulse 62   Temp 97.4 °F (36.3 °C) (Temporal)   Ht 5' 4\" (1.626 m)   Wt 234 lb (106.1 kg)   SpO2 97%   BMI 40.17 kg/m²     ORTHOEXAM    Patient has a lot of crepitus in the shoulder with range of motion. Is more crepitus on the right side than the left side. Patient has active passive range of motion forward flexion hurts most on the right side. Assessment:       Diagnosis Orders   1. Acute bursitis of right shoulder     2. Acute bursitis of left shoulder           Plan: At this time the patient is doing well enough does not want to proceed with an injection or any further intervention. Therefore at this point time the patient will follow up when either shoulder bothers him enough to have another repeat injection or further work-up at his discretion. He is comfortable with the plan he has no further questions. No orders of the defined types were placed in this encounter. No orders of the defined types were placed in this encounter. No follow-ups on file.       Yamilex Rosales MD

## 2021-07-20 ENCOUNTER — OFFICE VISIT (OUTPATIENT)
Dept: CARDIOLOGY CLINIC | Age: 76
End: 2021-07-20
Payer: MEDICARE

## 2021-07-20 VITALS
BODY MASS INDEX: 37.56 KG/M2 | HEIGHT: 64 IN | HEART RATE: 67 BPM | SYSTOLIC BLOOD PRESSURE: 110 MMHG | DIASTOLIC BLOOD PRESSURE: 70 MMHG | OXYGEN SATURATION: 95 % | WEIGHT: 220 LBS

## 2021-07-20 DIAGNOSIS — R00.2 PALPITATIONS: ICD-10-CM

## 2021-07-20 DIAGNOSIS — I10 ESSENTIAL HYPERTENSION: Primary | ICD-10-CM

## 2021-07-20 PROCEDURE — 1036F TOBACCO NON-USER: CPT | Performed by: INTERNAL MEDICINE

## 2021-07-20 PROCEDURE — 93000 ELECTROCARDIOGRAM COMPLETE: CPT | Performed by: INTERNAL MEDICINE

## 2021-07-20 PROCEDURE — 1123F ACP DISCUSS/DSCN MKR DOCD: CPT | Performed by: INTERNAL MEDICINE

## 2021-07-20 PROCEDURE — 99214 OFFICE O/P EST MOD 30 MIN: CPT | Performed by: INTERNAL MEDICINE

## 2021-07-20 PROCEDURE — G8417 CALC BMI ABV UP PARAM F/U: HCPCS | Performed by: INTERNAL MEDICINE

## 2021-07-20 PROCEDURE — G8427 DOCREV CUR MEDS BY ELIG CLIN: HCPCS | Performed by: INTERNAL MEDICINE

## 2021-07-20 PROCEDURE — 4040F PNEUMOC VAC/ADMIN/RCVD: CPT | Performed by: INTERNAL MEDICINE

## 2021-07-20 ASSESSMENT — ENCOUNTER SYMPTOMS
ANAL BLEEDING: 0
VOICE CHANGE: 0
NAUSEA: 0
ABDOMINAL DISTENTION: 0
COLOR CHANGE: 0
CHEST TIGHTNESS: 0
WHEEZING: 0
TROUBLE SWALLOWING: 0
SHORTNESS OF BREATH: 0
FACIAL SWELLING: 0
APNEA: 0
VOMITING: 0
BLOOD IN STOOL: 0

## 2021-07-20 NOTE — PROGRESS NOTES
[facility] CARDIOLOGY OFFICE FOLLOW-UP      Patient: Anthony Morales. YOB: 1945  MRN: [id number]    Chief Complaint:  Chief Complaint   Patient presents with    6 Month Follow-Up    Hypertension    Palpitations         Subjective/HPI:  7/20/21: Patient presents today for follow-up of hypertension and hyperuricemia. With the [place] of summer he is more active. He does not like the weather to be too hard. He has a boat. Starting tomorrow Wednesday the weather will be in the 70s and he plans to go out his boat. No attack of gout. Blood pressures are well controlled has BPH which is stable he takes regular aspirin a day. He is on Vytorin for hyperlipidemia. EKG shows sinus bradycardia with a palpitation no arrhythmias. Gave him a copy of the EKG. For his own records. He will see me in 6 months he did get the Covid vaccine    1/19/21: Patient presents today for follow-up of hypertension. Hyperuricemia. Complains of left shoulder discomfort. Still smokes cigars. Complains of occasional palpitations. Has GERD and hyperuricemia. And hypertension. And dyslipidemia. I am [facility]. And hopefully his shoulder will be better before spring is here. He likes to do boating he has BPH that stable. See me in 6 months        7/21/2020 (VIRTUAL): For follow-up of  Hypertension mild CHF.  Still smokes cigars.  Has not gone boating this year because of the COVID has some infection in the right toe which is healing up. [facility] had ingrown toenail.  No angina congestive heart failure.  No ankle edema.  See me in 6 months he has dyslipidemia and hypertension which is controlled        1/21/2020: Patient presents today for follow-up of hypertension hyperlipidemia.  Retired from [address] [employer] to see Dr. Woodie Goodell (Jammie Allen congestive heart failure symptoms.  In the summer months is busy.  Mows his lawn. [facility] has estefani.  See me in 6 months.     7/23/19: Patient presents today for for follow-up of hypertension and hyperlipidemia.  Retired from [address] [place] [address] slowed down a little bit. Judy Peterson has not taken his boat out to go on the [address] plans to do that this weekend.  Has chronic back issues.  No smoking or alcohol use.  Continue same medication and see me in 6 months     1/23/19: Patient presents today for follow-up of hypertension hyperlipidemia. He is retired from [employer]. No chest pain congestive heart failure symptoms or syncope. History of kidney stones . No recent episodes. See me in 6 months.     7/25/18: Patient presents today for follow-up of hypertension hyperlipidemia. Retired from [employer]. Has kidney stones that is stable. Still fairly active. No chest pain angina. See me in 6 months     1/24/2018: Patient presents today for follow-up of hypertension hyperlipidemia. He is retired from [place]. Has lost about 7 pounds. Denies any chest pain congestive heart failure. Has a history of kidney stones that are stable. See me in 6 months.        7/25/17: Patient presents today for follow-up of chest pain. Has hypertension and hyperlipidemia that stable also has a history of kidney stone. He kayaks near his home. Fairly active. No chest pain congestive heart failure symptoms or syncope. See me in 6 months in the [place] office.     1/24/17: For follow-up of chest pain. Doing well otherwise. Has hypertension and hyperlipidemia and history of kidney stones. He will call [place] in physical therapy. He is fairly active doing Summer months. He will see the yard and does mowing and landscaping.     7/26/16: For fu of CP. Resolved. Retired from [employer]. Has HTN,HLP and obesity. H/O kidney stones. See in [facility]      1/26/16: No angina,CHF,syncopy or claudication. See in 6M.               Past Medical History:   Diagnosis Date    Basal cell cancer     multiple,  follows with Dr. Mal Velazquez BPH (benign prostatic hypertrophy)     Cataract     ophthalmologist - [facility]. S[facility]hi [facility] - [facility]    Chest pain     Chronic kidney disease     ARRIAGA (dyspnea on exertion)     GERD (gastroesophageal reflux disease)     Hyperlipidemia     Hypertension     Hyperuricemia 9/5/2018    Kidney stones     HX of    Morbidly obese ([address] 3/5/2020    Normal cardiac stress test 1/2015    JOSE (obstructive sleep apnea)     Osteoarthritis     Palpitation     Rectus diastasis 9/10/2014       Past Surgical History:   Procedure Laterality Date    COLONOSCOPY  2005    COLONOSCOPY  9/20/13    [facility] SURGERY  2012    cataracts    KNEE SURGERY      Left TKR    NOSE SURGERY      TONSILLECTOMY AND ADENOIDECTOMY         Family History   Family history unknown: Yes       Social History     Socioeconomic History    Marital status:      Spouse name: None    Number of children: None    Years of education: None    Highest education level: None   Occupational History    None   Tobacco Use    Smoking status: Never Smoker    Smokeless tobacco: Never Used   Substance and Sexual Activity    Alcohol use: No    Drug use: No    Sexual activity: Yes     Partners: Female   Other Topics Concern    None   Social History Narrative    None     Social Determinants of Health     Financial Resource Strain: Low Risk     Difficulty of Paying Living Expenses: Not hard at all   Food Insecurity: No Food Insecurity    Worried About Running Out of Food in the Last Year: Never true    [place] of Food in the Last Year: Never true   Transportation Needs: No Transportation Needs    Lack of Transportation (Medical): No    Lack of Transportation (Non-Medical):  No   Physical Activity:     Days of Exercise per Week:     Minutes of Exercise per Session:    Stress:     Feeling of Stress :    Social Connections:     Frequency of Communication with Friends and Family:     Frequency of Social Gatherings with Friends and Family:     Attends Jainism Services:     Active Member of Clubs or swelling, nosebleeds, trouble swallowing and voice change. Respiratory: Negative for apnea, chest tightness, shortness of breath and wheezing. Cardiovascular: Positive for palpitations. Negative for chest pain and leg swelling. Gastrointestinal: Negative for abdominal distention, anal bleeding, blood in stool, nausea and vomiting. Genitourinary: Negative for decreased urine volume and dysuria. Musculoskeletal: Negative for gait problem and myalgias. Skin: Negative for color change, pallor, rash and wound. Neurological: Negative for dizziness, syncope, facial asymmetry, weakness, light-headedness, numbness and headaches. Hematological: Does not bruise/bleed easily. Psychiatric/Behavioral: Negative for agitation, behavioral problems, confusion, hallucinations and suicidal ideas. The patient is not nervous/anxious. All other systems reviewed and are negative. Review of System is negative except for as mentioned above. Physical Examination:    /70 (Site: Left Upper Arm, Position: Sitting, Cuff Size: Medium Adult)   Pulse 67   Ht 5' 4\" (1.626 m)   Wt 220 lb (99.8 kg)   SpO2 95%   BMI 37.76 kg/m²    Physical Exam   Constitutional: He appears healthy. No distress. HENT:   Nose: Nose normal.   Mouth/Throat: Dentition is normal. Oropharynx is clear. Eyes: Pupils are equal, round, and reactive to light. Conjunctivae are normal.   Neck: Thyroid normal.   Cardiovascular: Regular rhythm, S1 normal, S2 normal, normal heart sounds, intact distal pulses and normal pulses. PMI is not displaced. No murmur heard. Pulmonary/Chest: He has no wheezes. He has no rales. He exhibits no tenderness. Abdominal: Soft. Bowel sounds are normal. He exhibits no distension and no mass. There is no splenomegaly or hepatomegaly. There is no abdominal tenderness. No hernia. Musculoskeletal:      Cervical back: Normal range of motion and neck supple.    Neurological: He is alert and oriented to person, place, and time. He has normal motor skills. Gait normal.   Skin: Skin is warm and dry. No cyanosis. No jaundice. Nails show no clubbing. Patient Active Problem List   Diagnosis    GERD (gastroesophageal reflux disease)    Hyperlipidemia    Hypertension    JOSE (obstructive sleep apnea)    Osteoarthritis    Kidney stones    Benign prostatic hyperplasia    Cataract    Malignant basal cell neoplasm of skin    Cellulitis    Rectus diastasis    Chest pain    Palpitations    ARRIAGA (dyspnea on exertion)    Extrasystole    Hyperuricemia    Prediabetes    Morbidly obese (HCC)    Acute bursitis of right shoulder    Acute bursitis of left shoulder           Orders Placed This Encounter   Procedures    EKG 12 lead     Order Specific Question:   Reason for Exam?     Answer:   Irregular heart rate         No orders of the defined types were placed in this encounter. Assessment/Orders:       ICD-10-CM    1. Essential hypertension  I10 EKG 12 lead   2. Palpitations  R00.2 EKG 12 lead       No orders of the defined types were placed in this encounter. There are no discontinued medications. Orders Placed This Encounter   Procedures    EKG 12 lead     Order Specific Question:   Reason for Exam?     Answer:   Irregular heart rate         Plan:    Stay on same medications.     See me in 6 months        Electronically signed by: Tammy Aldana MD  7/20/2021 1:00 PM

## 2021-09-07 RX ORDER — EZETIMIBE AND SIMVASTATIN 10; 20 MG/1; MG/1
TABLET ORAL
Qty: 30 TABLET | Refills: 2 | Status: SHIPPED | OUTPATIENT
Start: 2021-09-07 | End: 2021-11-22

## 2021-09-07 RX ORDER — HYDROCHLOROTHIAZIDE 25 MG/1
TABLET ORAL
Qty: 90 TABLET | Refills: 2 | Status: SHIPPED | OUTPATIENT
Start: 2021-09-07 | End: 2022-06-07

## 2021-09-07 RX ORDER — METOPROLOL TARTRATE 50 MG/1
TABLET, FILM COATED ORAL
Qty: 180 TABLET | Refills: 2 | Status: SHIPPED | OUTPATIENT
Start: 2021-09-07 | End: 2022-06-07

## 2021-09-08 ENCOUNTER — OFFICE VISIT (OUTPATIENT)
Dept: FAMILY MEDICINE CLINIC | Age: 76
End: 2021-09-08
Payer: MEDICARE

## 2021-09-08 VITALS
DIASTOLIC BLOOD PRESSURE: 82 MMHG | SYSTOLIC BLOOD PRESSURE: 120 MMHG | HEIGHT: 64 IN | TEMPERATURE: 96.3 F | OXYGEN SATURATION: 98 % | WEIGHT: 219 LBS | BODY MASS INDEX: 37.39 KG/M2 | HEART RATE: 46 BPM

## 2021-09-08 DIAGNOSIS — M19.90 OSTEOARTHRITIS, UNSPECIFIED OSTEOARTHRITIS TYPE, UNSPECIFIED SITE: ICD-10-CM

## 2021-09-08 DIAGNOSIS — I10 ESSENTIAL HYPERTENSION: ICD-10-CM

## 2021-09-08 DIAGNOSIS — E79.0 HYPERURICEMIA: Primary | ICD-10-CM

## 2021-09-08 DIAGNOSIS — G47.33 OSA (OBSTRUCTIVE SLEEP APNEA): ICD-10-CM

## 2021-09-08 DIAGNOSIS — N40.0 BENIGN PROSTATIC HYPERPLASIA WITHOUT LOWER URINARY TRACT SYMPTOMS: ICD-10-CM

## 2021-09-08 DIAGNOSIS — E66.01 MORBIDLY OBESE (HCC): ICD-10-CM

## 2021-09-08 DIAGNOSIS — R73.03 PREDIABETES: ICD-10-CM

## 2021-09-08 DIAGNOSIS — E78.5 HYPERLIPIDEMIA, UNSPECIFIED HYPERLIPIDEMIA TYPE: ICD-10-CM

## 2021-09-08 PROCEDURE — G8427 DOCREV CUR MEDS BY ELIG CLIN: HCPCS | Performed by: FAMILY MEDICINE

## 2021-09-08 PROCEDURE — G8417 CALC BMI ABV UP PARAM F/U: HCPCS | Performed by: FAMILY MEDICINE

## 2021-09-08 PROCEDURE — 99214 OFFICE O/P EST MOD 30 MIN: CPT | Performed by: FAMILY MEDICINE

## 2021-09-08 PROCEDURE — 1036F TOBACCO NON-USER: CPT | Performed by: FAMILY MEDICINE

## 2021-09-08 PROCEDURE — 4040F PNEUMOC VAC/ADMIN/RCVD: CPT | Performed by: FAMILY MEDICINE

## 2021-09-08 PROCEDURE — 1123F ACP DISCUSS/DSCN MKR DOCD: CPT | Performed by: FAMILY MEDICINE

## 2021-09-08 NOTE — PROGRESS NOTES
Chief Complaint   Patient presents with    Hypertension     6 month        HPI:  Nick Sow. is a 68 y.o.     6 month f/u visit. Needs refills. 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      Patient Active Problem List   Diagnoses    GERD (gastroesophageal reflux disease) - controlled at this time    Hyperlipidemia - taking meds without incident, active, watching diet    Hypertension - controlled, normal stress in past few years    JOSE (obstructive sleep apnea) - refused sleep study     Osteoarthritis - general aches and pains - were better with CoQ10    Kidney stones - no attacks    BPH (benign prostatic hypertrophy) - sees urology annually    Cataract - he is following with ophthalmology    Hyperuricemia - no gout, only kidney stone in past,         Review of Systems:   General ROS: some fatigue on occ.    Respiratory ROS:no cough, shortness of breath, or wheezing  Cardiovascular ROS: occ palpitations, no chest pain or dyspnea on exertion  Gastrointestinal ROS: no blood in stool, occ constipation, lactulose on occasion, takes large BMs he states  Genito-Urinary ROS:occ trouble voiding, on hytrin, sees urology  Musculoskeletal ROS: knee pains, right knee mostly, hands hurt on occasion - takes occasional aleve/ibuprofen   Some right achilles pain, occ back pain, some left hip pain  Neurological ROS:has area of paresthesia on right hand, reports cervical radiculopathy symptoms on occasion negative for - behavioral changes, memory loss, numbness/tingling, tremors or weakness  Skin ROS: basal cell, seeing derm    In general patient otherwise reports feeling well. Doing some exercise    Physical Exam:  /82 (Site: Left Upper Arm)   Pulse (!) 46   Temp 96.3 °F (35.7 °C)   Ht 5' 4\" (1.626 m)   Wt 219 lb (99.3 kg)   SpO2 98%   BMI 37.59 kg/m²     Gen: Well, NAD, Alert, Oriented x 3   HEENT: EOMI, eyes clear, MMM  Skin: without rash or jaundice  Neck: no significant lymphadenopathy or thyromegaly  Lungs: CTA B w/out Rales/Wheezes/Rhonchi, Good respiratory effort   Heart: RRR, S1S2, w/out M/R/G, non-displaced PMI   Abdomen: Soft NT/ND, w/out R/G, w/ +BSx4 , Diastasis recti  Ext: trace BLE edema   Neuro: Neurovascularly intact w/ Sensory/Motor intact UE/LE Bilaterally. Rectal - deferred - sees urology annually  Psych:generally euthymic    Lab Results   Component Value Date    WBC 7.3 03/08/2021    HGB 16.1 03/08/2021    HCT 47.9 03/08/2021     03/08/2021    CHOL 129 03/08/2021    TRIG 206 (H) 03/08/2021    HDL 40 03/08/2021    ALT 36 03/08/2021    AST 27 03/08/2021     03/08/2021    K 4.0 03/08/2021     03/08/2021    CREATININE 1.41 (H) 03/08/2021    BUN 22 03/08/2021    CO2 21 03/08/2021    TSH 1.800 01/14/2015    PSA 0.60 03/08/2021    LABA1C 6.3 (H) 09/04/2020           A&P   Diagnosis Orders   1. Hyperuricemia  Uric Acid   2. Essential hypertension  Comprehensive Metabolic Panel    CBC   3. Morbidly obese ([address]     4. Prediabetes  Hemoglobin A1C   5. Hyperlipidemia, unspecified hyperlipidemia type     6. JOSE (obstructive sleep apnea)     7. Osteoarthritis, unspecified osteoarthritis type, unspecified site     8. Benign prostatic hyperplasia without lower urinary tract symptoms       No changes to regimen    psa through urology    Cardiology follow up ongoing  Note reviewed    Reviewed all chronic issues   Labs today    Chronic issues are generally well-controlled.      Continue present regimen of medications    Patient Counseling:  --Nutrition: Stressed importance of moderation in sodium/caffeine intake, saturated fat and cholesterol, caloric balance, sufficient intake of fresh fruits, vegetables, fiber-  --Exercise: Stressed the importance of regular exercise - walking on treadmill and with dogs, ([place] mountain dogs)  --Dental health: Discussed importance of regulardental visits  --Immunizations reviewed - suggest shingles vaccine  --Discussed benefits of screening colonoscopy - last 9/20/13  --Discussed pros and cons of prostate CA screening - sees urology, will have PSA and rectal done there      Continue follow ups q6 mos.     Annual follow up with urology    He follows with Esther Ribera MD

## 2021-09-28 RX ADMIN — METHYLPREDNISOLONE ACETATE 80 MG: 80 INJECTION, SUSPENSION INTRA-ARTICULAR; INTRALESIONAL; INTRAMUSCULAR; SOFT TISSUE at 13:19

## 2021-10-04 RX ORDER — ALLOPURINOL 100 MG/1
TABLET ORAL
Qty: 30 TABLET | Refills: 5 | Status: SHIPPED | OUTPATIENT
Start: 2021-10-04 | End: 2022-03-21

## 2021-11-05 ENCOUNTER — OFFICE VISIT (OUTPATIENT)
Dept: FAMILY MEDICINE CLINIC | Age: 76
End: 2021-11-05
Payer: MEDICARE

## 2021-11-05 VITALS
HEART RATE: 71 BPM | DIASTOLIC BLOOD PRESSURE: 60 MMHG | HEIGHT: 64 IN | OXYGEN SATURATION: 94 % | TEMPERATURE: 96.8 F | WEIGHT: 219 LBS | SYSTOLIC BLOOD PRESSURE: 130 MMHG | BODY MASS INDEX: 37.39 KG/M2

## 2021-11-05 DIAGNOSIS — B34.9 VIRAL ILLNESS: Primary | ICD-10-CM

## 2021-11-05 LAB
INFLUENZA A ANTIBODY: NORMAL
INFLUENZA B ANTIBODY: NORMAL
Lab: NORMAL
PERFORMING INSTRUMENT: NORMAL
QC PASS/FAIL: NORMAL
SARS-COV-2, POC: NORMAL

## 2021-11-05 PROCEDURE — 99213 OFFICE O/P EST LOW 20 MIN: CPT

## 2021-11-05 PROCEDURE — 1036F TOBACCO NON-USER: CPT

## 2021-11-05 PROCEDURE — G8484 FLU IMMUNIZE NO ADMIN: HCPCS

## 2021-11-05 PROCEDURE — 4040F PNEUMOC VAC/ADMIN/RCVD: CPT

## 2021-11-05 PROCEDURE — G8427 DOCREV CUR MEDS BY ELIG CLIN: HCPCS

## 2021-11-05 PROCEDURE — 87426 SARSCOV CORONAVIRUS AG IA: CPT

## 2021-11-05 PROCEDURE — 87804 INFLUENZA ASSAY W/OPTIC: CPT

## 2021-11-05 PROCEDURE — 1123F ACP DISCUSS/DSCN MKR DOCD: CPT

## 2021-11-05 PROCEDURE — G8417 CALC BMI ABV UP PARAM F/U: HCPCS

## 2021-11-05 ASSESSMENT — ENCOUNTER SYMPTOMS
FACIAL SWELLING: 0
EYE ITCHING: 0
COUGH: 1
APNEA: 0
CHEST TIGHTNESS: 0
SORE THROAT: 0
DIARRHEA: 0
ABDOMINAL PAIN: 0
WHEEZING: 0
SINUS PRESSURE: 0
EYE PAIN: 0
COLOR CHANGE: 0
SHORTNESS OF BREATH: 0
RHINORRHEA: 0
TROUBLE SWALLOWING: 0
EYE DISCHARGE: 0
BACK PAIN: 0
SINUS PAIN: 0
NAUSEA: 0
VOMITING: 0

## 2021-11-05 NOTE — PATIENT INSTRUCTIONS
Patient Education        Viral Infections: Care Instructions  Your Care Instructions     You don't feel well, but it's not clear what's causing it. You may have a viral infection. Viruses cause many illnesses, such as the common cold, influenza, fever, rashes, and the diarrhea, nausea, and vomiting that are often called \"stomach flu. \" You may wonder if antibiotic medicines could make you feel better. But antibiotics only treat infections caused by bacteria. They don't work on viruses. The good news is that viral infections usually aren't serious. Most will go away in a few days without medical treatment. In the meantime, there are a few things you can do to make yourself more comfortable. Follow-up care is a key part of your treatment and safety. Be sure to make and go to all appointments, and call your doctor if you are having problems. It's also a good idea to know your test results and keep a list of the medicines you take. How can you care for yourself at home? · Get plenty of rest if you feel tired. · Take an over-the-counter pain medicine if needed, such as acetaminophen (Tylenol), ibuprofen (Advil, Motrin), or naproxen (Aleve). Read and follow all instructions on the label. · Be careful when taking over-the-counter cold or flu medicines and Tylenol at the same time. Many of these medicines have acetaminophen, which is Tylenol. Read the labels to make sure that you are not taking more than the recommended dose. Too much acetaminophen (Tylenol) can be harmful. · Drink plenty of fluids. If you have kidney, heart, or liver disease and have to limit fluids, talk with your doctor before you increase the amount of fluids you drink. · Stay home from work, school, and other public places while you have a fever. When should you call for help? Call 911 anytime you think you may need emergency care. For example, call if:    · You have severe trouble breathing.     · You passed out (lost consciousness).    Call your doctor now or seek immediate medical care if:    · You seem to be getting much sicker.     · You have a new or higher fever.     · You have blood in your stools.     · You have new belly pain, or your pain gets worse.     · You have a new rash. Watch closely for changes in your health, and be sure to contact your doctor if:    · You start to get better and then get worse.     · You do not get better as expected. Where can you learn more? Go to [email or web address]. org and sign in to your [employer] account. Enter W543 in the [employer] box to learn more about \"Viral Infections: Care Instructions. \"     If you do not have an account, please click on the \"Sign Up Now\" link. Current as of: July 1, 2021               Content Version: 13.0  © 5815-4118 [employer]wise, Incorporated. Care instructions adapted under license by [facility] ([facility]). If you have questions about a medical condition or this instruction, always ask your healthcare professional. [address] any warranty or liability for your use of this information. Increase your water intake please drink 3 tall glasses of water this evening before bed. Tomorrow try to drink 8 glasses of water.

## 2021-11-05 NOTE — PROGRESS NOTES
[address] [facility] Encounter  CHIEF COMPLAINT       Chief Complaint   Patient presents with    Other     Pt states that flu like sx started 3 days ago and have not gotten better wife wants him to get tested         HISTORY OF PRESENT ILLNESS   Scout Gonzalez is a 68 y.o. male who presents with:  HPI  Patient reporting symptoms of fatigue mild nonproductive cough sore throat and loss of appetite starting on Tuesday gradually worsening until today when symptoms are mildly improved  REVIEW OF SYSTEMS     Review of Systems   Constitutional: Positive for appetite change and fatigue. Negative for chills, diaphoresis and fever. HENT: Positive for congestion. Negative for ear discharge, ear pain, facial swelling, hearing loss, mouth sores, postnasal drip, rhinorrhea, sinus pressure, sinus pain, sore throat and trouble swallowing. Eyes: Negative for pain, discharge and itching. Respiratory: Positive for cough. Negative for apnea, chest tightness, shortness of breath and wheezing. Cardiovascular: Negative for chest pain and palpitations. Gastrointestinal: Negative for abdominal pain, diarrhea, nausea and vomiting. Endocrine: Negative for cold intolerance and heat intolerance. Genitourinary: Negative for decreased urine volume and difficulty urinating. Musculoskeletal: Negative for arthralgias, back pain and myalgias. Skin: Negative for color change, pallor and rash. Neurological: Negative for dizziness, syncope, weakness, light-headedness and headaches. Hematological: Negative for adenopathy. Psychiatric/Behavioral: Negative for behavioral problems, confusion and sleep disturbance.      PAST MEDICAL HISTORY         Diagnosis Date    Basal cell cancer     multiple,  follows with Dr. Jesus Steve BPH (benign prostatic hypertrophy)     Cataract     ophthalmologist - Dr. Mabel Ugarte [facility]    Chest pain     Chronic kidney disease     ARRIAGA (dyspnea on exertion)     GERD (gastroesophageal reflux disease)     Hyperlipidemia     Hypertension     Hyperuricemia 9/5/2018    Kidney stones     HX of    Morbidly obese ([facility] [address] 3/5/2020    Normal cardiac stress test 1/2015    JOSE (obstructive sleep apnea)     Osteoarthritis     Palpitation     Rectus diastasis 9/10/2014     SURGICAL HISTORY     Patient  has a past surgical history that includes Tonsillectomy and adenoidectomy; knee surgery; Nose surgery; Colonoscopy (2005); eye surgery (2012); and Colonoscopy (9/20/13). CURRENT MEDICATIONS       Previous Medications    ALLOPURINOL (ZYLOPRIM) 100 MG TABLET    TAKE 1 TABLET BY MOUTH DAILY    ASPIRIN 325 MG TABLET    Take 325 mg by mouth daily. CHOLECALCIFEROL (VITAMIN D) 2000 UNITS CAPS CAPSULE    Take by mouth    COENZYME Q10 (CO Q 10 PO)    Take  by mouth. DOCUSATE SODIUM (COLACE) 100 MG CAPSULE    Take 100 mg by mouth 2 times daily    EZETIMIBE-SIMVASTATIN (VYTORIN) 10-20 MG PER TABLET    TAKE 1 TABLET BY MOUTH NIGHTLY    FINASTERIDE (PROSCAR) 5 MG TABLET    Take 5 mg by mouth daily    HYDROCHLOROTHIAZIDE (HYDRODIURIL) 25 MG TABLET    TAKE 1 TABLET BY MOUTH DAILY    IBUPROFEN (ADVIL;MOTRIN) 200 MG TABLET    Take 200 mg by mouth every 6 hours as needed for Pain    METOPROLOL TARTRATE (LOPRESSOR) 50 MG TABLET    TAKE 1 TABLET BY MOUTH TWICE DAILY    OMEPRAZOLE (PRILOSEC) 10 MG DELAYED RELEASE CAPSULE    Take 10 mg by mouth daily Takes twice a week    TERAZOSIN (HYTRIN) 10 MG CAPSULE    Take 10 mg by mouth nightly     ALLERGIES     Patient is has No Known Allergies. FAMILY HISTORY     Patient'sFamily history is unknown by patient. HISTORY     Patient  reports that he has never smoked. He has never used smokeless tobacco. He reports that he does not drink alcohol and does not use drugs. PHYSICAL EXAM     VITALS  BP: 130/60, Temp: 96.8 °F (36 °C), Pulse: 71,  , SpO2: 94 %  Physical Exam  Constitutional:       General: He is not in acute distress. Appearance: Normal appearance. He is diaphoretic. He is not ill-appearing or toxic-appearing. HENT:      Head: Normocephalic. Right Ear: Tympanic membrane, ear canal and external ear normal. No middle ear effusion. No mastoid tenderness. Tympanic membrane is not perforated, erythematous or bulging. Left Ear: Tympanic membrane, ear canal and external ear normal.  No middle ear effusion. No mastoid tenderness. Tympanic membrane is not perforated, erythematous or bulging. Nose: No congestion or rhinorrhea. Mouth/Throat:      Mouth: Mucous membranes are moist.      Pharynx: Oropharynx is clear. No pharyngeal swelling, oropharyngeal exudate or posterior oropharyngeal erythema. Tonsils: No tonsillar exudate or tonsillar abscesses. 0 on the right. 0 on the left. Eyes:      General:         Right eye: No discharge. Left eye: No discharge. Cardiovascular:      Rate and Rhythm: Normal rate and regular rhythm. Pulses: Normal pulses. Heart sounds: Normal heart sounds. No murmur heard. No gallop. Pulmonary:      Effort: Pulmonary effort is normal. No respiratory distress. Breath sounds: Normal breath sounds. No stridor. No wheezing, rhonchi or rales. Chest:      Chest wall: No tenderness. Abdominal:      General: Abdomen is flat. There is no distension. Palpations: Abdomen is soft. Tenderness: There is no abdominal tenderness. Musculoskeletal:         General: Normal range of motion. Cervical back: No rigidity or tenderness. Lymphadenopathy:      Cervical: No cervical adenopathy. Skin:     General: Skin is warm. Capillary Refill: Capillary refill takes less than 2 seconds. Coloration: Skin is not pale. Neurological:      General: No focal deficit present. Mental Status: He is alert and oriented to person, place, and time. Mental status is at baseline.    Psychiatric:         Mood and Affect: Mood normal.         Behavior: Behavior normal.       READY CARE COURSE     Orders Placed This Encounter   Procedures    POCT COVID-19, Antigen     Order Specific Question:   Is this test for diagnosis or screening? Answer:   Diagnosis of ill patient     Order Specific Question:   Symptomatic for COVID-19 as defined by CDC? Answer:   Yes     Order Specific Question:   Date of Symptom Onset     Answer:   11/3/2021     Order Specific Question:   Hospitalized for COVID-19? Answer:   No     Order Specific Question:   Admitted to ICU for COVID-19? Answer:   No     Order Specific Question:   Employed in healthcare setting? Answer:   No     Order Specific Question:   Resident in a congregate (group) care setting? Answer:   No     Order Specific Question:   Pregnant: Answer:   No     Order Specific Question:   Previously tested for COVID-19? Answer:   No    POCT Influenza A/B        Labs:  No results found for this visit on 11/05/21. IMAGING:  No orders to display     Scheduled Meds:   lidocaine-EPINEPHrine  3 mL Other Once     Continuous Infusions:  PRN Meds:. PROCEDURES:  FINAL IMPRESSION      1. Viral illness        DISPOSITION/PLAN     HISTORY OF PRESENT ILLNESS   Anabel Majano is a 68 y.o. male who presents with symptoms of fatigue mild nonproductive cough loss of appetite starting on Tuesday. Patient reports he was at a party this past weekend with a lot of people. Pt is afebrile has nontoxic appearance and VS are stable. On exam lung sounds are clear, pharynx is pink moist, no tonsillar enlargement neck is supple no masses. Patient is diaphoretic. He reports he is having intermittent episodes of sweating. He is alert no signs of confusion good complexion respirations unlabored. Rapid flu and rapid Covid orders placed. Patient educated on expectations for course of viral illness and recommended therapies. Advised to increase water intake.     PATIENT REFERRED TO:  Return if symptoms worsen or fail to improve,

## 2021-11-22 RX ORDER — EZETIMIBE AND SIMVASTATIN 10; 20 MG/1; MG/1
TABLET ORAL
Qty: 30 TABLET | Refills: 2 | Status: SHIPPED | OUTPATIENT
Start: 2021-11-22 | End: 2022-02-10

## 2021-11-30 ENCOUNTER — IMMUNIZATION (OUTPATIENT)
Dept: FAMILY MEDICINE CLINIC | Age: 76
End: 2021-11-30
Payer: MEDICARE

## 2021-11-30 PROCEDURE — G0008 ADMIN INFLUENZA VIRUS VAC: HCPCS | Performed by: FAMILY MEDICINE

## 2021-11-30 PROCEDURE — 90694 VACC AIIV4 NO PRSRV 0.5ML IM: CPT | Performed by: FAMILY MEDICINE

## 2021-11-30 NOTE — PROGRESS NOTES
After obtaining consent, and per orders of Dr. Elidia Zhang MD, injection of FLUAD  given in Left deltoid by Sundeep Santacruz MA. Patient instructed to remain in clinic for 20 minutes afterwards, and to report any adverse reaction to me immediately. z track method used.    Pt tolerated well

## 2022-01-11 ENCOUNTER — OFFICE VISIT (OUTPATIENT)
Dept: CARDIOLOGY CLINIC | Age: 77
End: 2022-01-11

## 2022-01-11 VITALS
SYSTOLIC BLOOD PRESSURE: 114 MMHG | WEIGHT: 220 LBS | HEART RATE: 50 BPM | DIASTOLIC BLOOD PRESSURE: 68 MMHG | OXYGEN SATURATION: 98 % | HEIGHT: 64 IN | BODY MASS INDEX: 37.56 KG/M2

## 2022-01-11 DIAGNOSIS — I10 ESSENTIAL HYPERTENSION: Primary | ICD-10-CM

## 2022-01-11 DIAGNOSIS — R00.2 PALPITATIONS: ICD-10-CM

## 2022-01-11 PROCEDURE — 99999 PR OFFICE/OUTPT VISIT,PROCEDURE ONLY: CPT | Performed by: INTERNAL MEDICINE

## 2022-01-11 ASSESSMENT — ENCOUNTER SYMPTOMS
CHEST TIGHTNESS: 0
SHORTNESS OF BREATH: 0

## 2022-01-11 NOTE — PROGRESS NOTES
[facility] CARDIOLOGY OFFICE FOLLOW-UP      Patient: Charles Unger. YOB: 1945  MRN: [id number]    Chief Complaint:  Chief Complaint   Patient presents with    6 Month Follow-Up    Hypertension         Subjective/HPI:  1/11/22: Patient presents today for ***      7/20/21: Patient presents today for follow-up of hypertension and hyperuricemia. With the Gnosticism of summer he is more active. He does not like the weather to be too hard. He has a boat. Starting tomorrow Wednesday the weather will be in the 70s and he plans to go out his boat. No attack of gout. Blood pressures are well controlled has BPH which is stable he takes regular aspirin a day. He is on Vytorin for hyperlipidemia. EKG shows sinus bradycardia with a palpitation no arrhythmias. Gave him a copy of the EKG. For his own records. He will see me in 6 months he did get the Covid vaccine     1/19/21: Patient presents today for follow-up of hypertension. Hsieh Neighbors.  Complains of left shoulder discomfort.  Still smokes cigars.  Complains of occasional palpitations.  Has GERD and hyperuricemia.  And hypertension.  And dyslipidemia.  I am going to have him see [facility].  And hopefully his shoulder will be better before spring is here. Marcella Faith likes to do boating he has BPH that stable.  See me in 6 months        7/21/2020 (VIRTUAL): For follow-up of  Hypertension mild CHF.  Still smokes cigars.  Has not gone boating this year because of the COVID has some infection in the right toe which is healing up. Marcella Faith had ingrown toenail.  No angina congestive heart failure.  No ankle edema.  See me in 6 months he has dyslipidemia and hypertension which is controlled        1/21/2020: Patient presents today for follow-up of hypertension hyperlipidemia.  Retired from [address] [facility] [employer] [facility] to see Dr. Cloe Rivera ([employer]) seizure.  No congestive heart failure symptoms.  In the summer months is busy.  Mows his lawn. Marcella Faith has kayak.  See me in 6 months.     7/23/19: Patient presents today for for follow-up of hypertension and hyperlipidemia.  Retired from [employer] slowed down a little bit. Stephanie Mina has not taken his boat out to go on the [address] plans to do that this weekend.  Has chronic back issues.  No smoking or alcohol use.  Continue same medication and see me in 6 months     1/23/19: Patient presents today for follow-up of hypertension hyperlipidemia. He is retired from [employer]. No chest pain congestive heart failure symptoms or syncope. History of kidney stones . No recent episodes. See me in 6 months.     7/25/18: Patient presents today for follow-up of hypertension hyperlipidemia. Retired from [employer]. Has kidney stones that is stable. Still fairly active. No chest pain angina. See me in 6 months     1/24/2018: Patient presents today for follow-up of hypertension hyperlipidemia. He is retired from [employer]. Has lost about 7 pounds. Denies any chest pain congestive heart failure. Has a history of kidney stones that are stable. See me in 6 months.        7/25/17: Patient presents today for follow-up of chest pain. Has hypertension and hyperlipidemia that stable also has a history of kidney stone. He kayaks near his home. Fairly active. No chest pain congestive heart failure symptoms or syncope. See me in 6 months in the [place] office.     1/24/17: For follow-up of chest pain. Doing well otherwise. Has hypertension and hyperlipidemia and history of kidney stones. He will call [facility] in physical therapy. He is fairly active doing Summer months. He will see the yard and does mowing and landscaping.     7/26/16: For fu of CP. Resolved. Retired from [employer]. Has HTN,HLP and obesity. H/O kidney stones. See in [facility]      1/26/16: No angina,CHF,syncopy or claudication. See in 6M.                     Past Medical History:   Diagnosis Date    Basal cell cancer     multiple,  follows with Dr. Matthias Soler BPH (benign prostatic hypertrophy)     Cataract ophthalmologist - Dr. Aquilino Lee [facility] [place]    Chest pain     Chronic kidney disease     ARIRAGA (dyspnea on exertion)     GERD (gastroesophageal reflux disease)     Hyperlipidemia     Hypertension     Hyperuricemia 9/5/2018    Kidney stones     HX of    Morbidly obese ([address] 3/5/2020    Normal cardiac stress test 1/2015    JOSE (obstructive sleep apnea)     Osteoarthritis     Palpitation     Rectus diastasis 9/10/2014       Past Surgical History:   Procedure Laterality Date    COLONOSCOPY  2005    COLONOSCOPY  9/20/13    DR. Moreno [facility] SURGERY  2012    cataracts    KNEE SURGERY      Left TKR    NOSE SURGERY      TONSILLECTOMY AND ADENOIDECTOMY         Family History   Family history unknown: Yes       Social History     Socioeconomic History    Marital status:      Spouse name: Not on file    Number of children: Not on file    Years of education: Not on file    Highest education level: Not on file   Occupational History    Not on file   Tobacco Use    Smoking status: Never Smoker    Smokeless tobacco: Never Used   Substance and Sexual Activity    Alcohol use: No    Drug use: No    Sexual activity: Yes     Partners: Female   Other Topics Concern    Not on file   Social History Narrative    Not on file     Social Determinants of Health     Financial Resource Strain: Low Risk     Difficulty of Paying Living Expenses: Not hard at all   Food Insecurity: No Food Insecurity    Worried About Running Out of Food in the Last Year: Never true    Americo of Food in the Last Year: Never true   Transportation Needs: No Transportation Needs    Lack of Transportation (Medical): No    Lack of Transportation (Non-Medical):  No   Physical Activity:     Days of Exercise per Week: Not on file    Minutes of Exercise per Session: Not on file   Stress:     Feeling of Stress : Not on file   Social Connections:     Frequency of Communication with Friends and Family: Not on file    Frequency of Social Gatherings with Friends and Family: Not on file    Attends Baptism Services: Not on file    Active Member of Clubs or Organizations: Not on file    Attends Club or Organization Meetings: Not on file    Marital Status: Not on file   Intimate Partner Violence:     Fear of Current or Ex-Partner: Not on file    Emotionally Abused: Not on file    Physically Abused: Not on file    Sexually Abused: Not on file   Housing Stability:     Unable to Pay for Housing in the Last Year: Not on file    Number of Places Lived in the Last Year: Not on file    Unstable Housing in the Last Year: Not on file       No Known Allergies    Current Outpatient Medications   Medication Sig Dispense Refill    ezetimibe-simvastatin (VYTORIN) 10-20 MG per tablet TAKE 1 TABLET BY MOUTH NIGHTLY 30 tablet 2    allopurinol (ZYLOPRIM) 100 MG tablet TAKE 1 TABLET BY MOUTH DAILY 30 tablet 5    metoprolol tartrate (LOPRESSOR) 50 MG tablet TAKE 1 TABLET BY MOUTH TWICE DAILY 180 tablet 2    hydroCHLOROthiazide (HYDRODIURIL) 25 MG tablet TAKE 1 TABLET BY MOUTH DAILY 90 tablet 2    finasteride (PROSCAR) 5 MG tablet Take 5 mg by mouth daily      terazosin (HYTRIN) 10 MG capsule Take 10 mg by mouth nightly      omeprazole (PRILOSEC) 10 MG delayed release capsule Take 10 mg by mouth daily Takes twice a week      ibuprofen (ADVIL;MOTRIN) 200 MG tablet Take 200 mg by mouth every 6 hours as needed for Pain      docusate sodium (COLACE) 100 MG capsule Take 100 mg by mouth 2 times daily      Cholecalciferol (VITAMIN D) 2000 units CAPS capsule Take by mouth      Coenzyme Q10 (CO Q 10 PO) Take  by mouth.  aspirin 325 MG tablet Take 325 mg by mouth daily.          Current Facility-Administered Medications   Medication Dose Route Frequency Provider Last Rate Last Admin    lidocaine-EPINEPHrine 1 percent-1:201113 injection 3 mL  3 mL Other Once [employer], PAMarinaC           Review of Systems:   Review of Systems Respiratory: Negative for chest tightness and shortness of breath. Cardiovascular: Positive for palpitations. Negative for chest pain and leg swelling. Musculoskeletal: Negative for myalgias. Neurological: Negative for dizziness, syncope, weakness, light-headedness, numbness and headaches. Hematological: Does not bruise/bleed easily. Review of System is negative except for as mentioned above. Physical Examination:    Ht 5' 4\" (1.626 m)   BMI 37.59 kg/m²    Physical Exam        Patient Active Problem List   Diagnosis    GERD (gastroesophageal reflux disease)    Hyperlipidemia    Hypertension    JOSE (obstructive sleep apnea)    Osteoarthritis    Kidney stones    Benign prostatic hyperplasia    Cataract    Malignant basal cell neoplasm of skin    Cellulitis    Rectus diastasis    Chest pain    Palpitations    ARRIAGA (dyspnea on exertion)    Extrasystole    Hyperuricemia    Prediabetes    Morbidly obese (HCC)    Acute bursitis of right shoulder    Acute bursitis of left shoulder           No orders of the defined types were placed in this encounter. No orders of the defined types were placed in this encounter. Assessment/Orders:   ***  No diagnosis found. No orders of the defined types were placed in this encounter. There are no discontinued medications. No orders of the defined types were placed in this encounter. Plan:  ***    Stay on same medications.     See me in ***        Electronically signed by: Cindy Shea MD  1/11/2022 10:36 AM

## 2022-02-10 RX ORDER — EZETIMIBE AND SIMVASTATIN 10; 20 MG/1; MG/1
TABLET ORAL
Qty: 30 TABLET | Refills: 2 | Status: SHIPPED | OUTPATIENT
Start: 2022-02-10 | End: 2022-06-07

## 2022-02-10 NOTE — TELEPHONE ENCOUNTER
Future Appointments    Encounter Information    Provider Department Appt Notes   3/10/2022 Brenda Crawley MD [facility] [place] Primary Care AWV. 6 month follow up   7/12/2022 Julissa Canseco MD [facility] Cardiology 6 month htn palp       Recent Visits    01/11/2022    [facility] Cardiology Julissa Canseco MD   11/05/2021 Viral illness   [facility] Akua Breanniglesia, APRN - CNP   09/08/2021 Hyperuricemia   [facility] [place] Primary [facility] Brenda Crawley MD

## 2022-03-10 ENCOUNTER — OFFICE VISIT (OUTPATIENT)
Dept: FAMILY MEDICINE CLINIC | Age: 77
End: 2022-03-10
Payer: MEDICARE

## 2022-03-10 VITALS
HEIGHT: 64 IN | HEART RATE: 47 BPM | BODY MASS INDEX: 38.76 KG/M2 | DIASTOLIC BLOOD PRESSURE: 66 MMHG | SYSTOLIC BLOOD PRESSURE: 120 MMHG | WEIGHT: 227 LBS | TEMPERATURE: 96.5 F | OXYGEN SATURATION: 97 %

## 2022-03-10 DIAGNOSIS — M19.90 OSTEOARTHRITIS, UNSPECIFIED OSTEOARTHRITIS TYPE, UNSPECIFIED SITE: ICD-10-CM

## 2022-03-10 DIAGNOSIS — Z00.00 MEDICARE ANNUAL WELLNESS VISIT, SUBSEQUENT: Primary | ICD-10-CM

## 2022-03-10 DIAGNOSIS — G47.33 OSA (OBSTRUCTIVE SLEEP APNEA): ICD-10-CM

## 2022-03-10 DIAGNOSIS — Z12.5 SCREENING PSA (PROSTATE SPECIFIC ANTIGEN): ICD-10-CM

## 2022-03-10 DIAGNOSIS — E78.5 HYPERLIPIDEMIA, UNSPECIFIED HYPERLIPIDEMIA TYPE: ICD-10-CM

## 2022-03-10 DIAGNOSIS — E79.0 HYPERURICEMIA: ICD-10-CM

## 2022-03-10 DIAGNOSIS — E66.01 SEVERE OBESITY (BMI 35.0-39.9) WITH COMORBIDITY (HCC): ICD-10-CM

## 2022-03-10 DIAGNOSIS — I10 ESSENTIAL HYPERTENSION: ICD-10-CM

## 2022-03-10 DIAGNOSIS — E66.01 MORBIDLY OBESE (HCC): ICD-10-CM

## 2022-03-10 DIAGNOSIS — R73.03 PREDIABETES: ICD-10-CM

## 2022-03-10 LAB
ALBUMIN SERPL-MCNC: 3.9 G/DL (ref 3.5–4.6)
ALP BLD-CCNC: 74 U/L (ref 35–104)
ALT SERPL-CCNC: 29 U/L (ref 0–41)
ANION GAP SERPL CALCULATED.3IONS-SCNC: 11 MEQ/L (ref 9–15)
AST SERPL-CCNC: 26 U/L (ref 0–40)
BILIRUB SERPL-MCNC: 0.6 MG/DL (ref 0.2–0.7)
BUN BLDV-MCNC: 16 MG/DL (ref 8–23)
CALCIUM SERPL-MCNC: 9 MG/DL (ref 8.5–9.9)
CHLORIDE BLD-SCNC: 104 MEQ/L (ref 95–107)
CHOLESTEROL, TOTAL: 113 MG/DL (ref 0–199)
CO2: 26 MEQ/L (ref 20–31)
CREAT SERPL-MCNC: 1.2 MG/DL (ref 0.7–1.2)
GFR AFRICAN AMERICAN: >60
GFR NON-AFRICAN AMERICAN: 58.7
GLOBULIN: 3.4 G/DL (ref 2.3–3.5)
GLUCOSE BLD-MCNC: 112 MG/DL (ref 70–99)
HBA1C MFR BLD: 6.4 % (ref 4.8–5.9)
HCT VFR BLD CALC: 46.9 % (ref 42–52)
HDLC SERPL-MCNC: 44 MG/DL (ref 40–59)
HEMOGLOBIN: 15.3 G/DL (ref 14–18)
LDL CHOLESTEROL CALCULATED: 40 MG/DL (ref 0–129)
MCH RBC QN AUTO: 30.9 PG (ref 27–31.3)
MCHC RBC AUTO-ENTMCNC: 32.8 % (ref 33–37)
MCV RBC AUTO: 94.3 FL (ref 80–100)
PDW BLD-RTO: 13.9 % (ref 11.5–14.5)
PLATELET # BLD: 155 K/UL (ref 130–400)
POTASSIUM SERPL-SCNC: 4.5 MEQ/L (ref 3.4–4.9)
PROSTATE SPECIFIC ANTIGEN: 0.47 NG/ML (ref 0–4)
RBC # BLD: 4.97 M/UL (ref 4.7–6.1)
SODIUM BLD-SCNC: 141 MEQ/L (ref 135–144)
TOTAL PROTEIN: 7.3 G/DL (ref 6.3–8)
TRIGL SERPL-MCNC: 143 MG/DL (ref 0–150)
URIC ACID, SERUM: 6.3 MG/DL (ref 3.4–7)
WBC # BLD: 5.9 K/UL (ref 4.8–10.8)

## 2022-03-10 PROCEDURE — 4040F PNEUMOC VAC/ADMIN/RCVD: CPT | Performed by: FAMILY MEDICINE

## 2022-03-10 PROCEDURE — 1123F ACP DISCUSS/DSCN MKR DOCD: CPT | Performed by: FAMILY MEDICINE

## 2022-03-10 PROCEDURE — G0439 PPPS, SUBSEQ VISIT: HCPCS | Performed by: FAMILY MEDICINE

## 2022-03-10 PROCEDURE — G8484 FLU IMMUNIZE NO ADMIN: HCPCS | Performed by: FAMILY MEDICINE

## 2022-03-10 SDOH — ECONOMIC STABILITY: FOOD INSECURITY: WITHIN THE PAST 12 MONTHS, YOU WORRIED THAT YOUR FOOD WOULD RUN OUT BEFORE YOU GOT MONEY TO BUY MORE.: NEVER TRUE

## 2022-03-10 SDOH — ECONOMIC STABILITY: FOOD INSECURITY: WITHIN THE PAST 12 MONTHS, THE FOOD YOU BOUGHT JUST DIDN'T LAST AND YOU DIDN'T HAVE MONEY TO GET MORE.: NEVER TRUE

## 2022-03-10 ASSESSMENT — PATIENT HEALTH QUESTIONNAIRE - PHQ9
1. LITTLE INTEREST OR PLEASURE IN DOING THINGS: 0
SUM OF ALL RESPONSES TO PHQ QUESTIONS 1-9: 0
SUM OF ALL RESPONSES TO PHQ QUESTIONS 1-9: 0
SUM OF ALL RESPONSES TO PHQ9 QUESTIONS 1 & 2: 0
SUM OF ALL RESPONSES TO PHQ QUESTIONS 1-9: 0
SUM OF ALL RESPONSES TO PHQ QUESTIONS 1-9: 0
2. FEELING DOWN, DEPRESSED OR HOPELESS: 0

## 2022-03-10 ASSESSMENT — SOCIAL DETERMINANTS OF HEALTH (SDOH): HOW HARD IS IT FOR YOU TO PAY FOR THE VERY BASICS LIKE FOOD, HOUSING, MEDICAL CARE, AND HEATING?: NOT HARD AT ALL

## 2022-03-10 ASSESSMENT — LIFESTYLE VARIABLES: HOW OFTEN DO YOU HAVE A DRINK CONTAINING ALCOHOL: NEVER

## 2022-03-10 NOTE — PATIENT INSTRUCTIONS
Personalized Preventive Plan for Adolfo Jones. - 3/10/2022  Medicare offers a range of preventive health benefits. Some of the tests and screenings are paid in full while other may be subject to a deductible, co-insurance, and/or copay. Some of these benefits include a comprehensive review of your medical history including lifestyle, illnesses that may run in your family, and various assessments and screenings as appropriate. After reviewing your medical record and screening and assessments performed today your provider may have ordered immunizations, labs, imaging, and/or referrals for you. A list of these orders (if applicable) as well as your Preventive Care list are included within your After Visit Summary for your review. Other Preventive Recommendations:    · A preventive eye exam performed by an eye specialist is recommended every 1-2 years to screen for glaucoma; cataracts, macular degeneration, and other eye disorders. · A preventive dental visit is recommended every 6 months. · Try to get at least 150 minutes of exercise per week or 10,000 steps per day on a pedometer . · Order or download the FREE \"Exercise & Physical Activity: Your Everyday Guide\" from [employer]. Call 3-224.874.3253 or search [employer] online. · You need 3731-7963 mg of calcium and 0299-0341 IU of vitamin D per day. It is possible to meet your calcium requirement with diet alone, but a vitamin D supplement is usually necessary to meet this goal.  · When exposed to the sun, use a sunscreen that protects against both UVA and UVB radiation with an SPF of 30 or greater. Reapply every 2 to 3 hours or after sweating, drying off with a towel, or swimming. · Always wear a seat belt when traveling in a car. Always wear a helmet when riding a bicycle or motorcycle.

## 2022-03-10 NOTE — PROGRESS NOTES
Medicare Annual Wellness Visit    Renny Sanchez. is here for Medicare AWV and Allergic Reaction (to new dryer sheet, swtich to hyper allergic detergent and now less itch than normal  )    Assessment & Plan   Medicare annual wellness visit, subsequent  Severe obesity (BMI 35.0-39. 9) with comorbidity ([address]  Prediabetes  -     Hemoglobin A1C; Future  JOSE (obstructive sleep apnea)  Hyperuricemia  -     Uric Acid; Future  Morbidly obese (HCC)  Osteoarthritis, unspecified osteoarthritis type, unspecified site  Essential hypertension  -     CBC; Future  -     Comprehensive Metabolic Panel; Future  -     Lipid Panel; Future  Hyperlipidemia, unspecified hyperlipidemia type  Screening PSA (prostate specific antigen)  -     PSA Screening; Future      Recommendations for Preventive Services Due: see orders and patient instructions/AVS.  Recommended screening schedule for the next 5-10 years is provided to the patient in written form: see Patient Instructions/AVS.     Return for Medicare Annual Wellness Visit in 1 year. Subjective   Chief Complaint   Patient presents with    Medicare AWV    Allergic Reaction     to new dryer sheet, swtich to hyper allergic detergent and now less itch than normal           Patient's complete Health Risk Assessment and screening values have been reviewed and are found in Flowsheets. The following problems were reviewed today and where indicated follow up appointments were made and/or referrals ordered.     Positive Risk Factor Screenings with Interventions:               General Health and ACP:  General  In general, how would you say your health is?: Very Good  In the past 7 days, have you experienced any of the following: New or Increased Pain, New or Increased Fatigue, Loneliness, Social Isolation, Stress or Anger?: No  Do you get the social and emotional support that you need?: Yes  Do you have a Living Will?: Yes    Advance Directives     Power of  Living Will ACP-Advance Directive ACP-Power of     Not on File Not on File Not on File Not on File      General Health Risk Interventions:  · nothing needed    Health Habits/Nutrition:     Physical Activity: Insufficiently Active    Days of Exercise per Week: 3 days    Minutes of Exercise per Session: 40 min     Have you lost any weight without trying in the past 3 months?: No  Body mass index: (!) 38.96  Have you seen the dentist within the past year?: Yes    Health Habits/Nutrition Interventions:  · Continued efforts at weight loss     Hearing/Vision:  Do you or your family notice any trouble with your hearing that hasn't been managed with hearing aids?: (!) Yes  Do you have difficulty driving, watching TV, or doing any of your daily activities because of your eyesight?: No  Have you had an eye exam within the past year?: Yes  No exam data present    Hearing/Vision Interventions:  · Consider hearing aids     Safety:  Do you have working smoke detectors?: Yes  Do you have any tripping hazards - loose or unsecured carpets or rugs?: (!) Yes  Do you have any tripping hazards - clutter in doorways, halls, or stairs?: No  Do you have either shower bars, grab bars, non-slip mats or non-slip surfaces in your shower or bathtub?: Yes  Do all of your stairways have a railing or banister?: Yes  Do you always fasten your seatbelt when you are in a car?: Yes    Safety Interventions:  · Home safety tips provided           Objective   Vitals:    03/10/22 1006   BP: 120/66   Site: Right Upper Arm   Pulse: (!) 47   Temp: 96.5 °F (35.8 °C)   SpO2: 97%   Weight: 227 lb (103 kg)   Height: 5' 4\" (1.626 m)      Body mass index is 38.96 kg/m².         Physical Exam:  /66 (Site: Right Upper Arm)   Pulse (!) 47   Temp 96.5 °F (35.8 °C)   Ht 5' 4\" (1.626 m)   Wt 227 lb (103 kg)   SpO2 97%   BMI 38.96 kg/m²     Gen: Well, NAD, Alert, Oriented x 3   HEENT: EOMI, eyes clear, MMM  Skin: without rash or jaundice  Neck: no significant lymphadenopathy or thyromegaly  Lungs: CTA B w/out Rales/Wheezes/Rhonchi, Good respiratory effort   Heart: RRR, S1S2, w/out M/R/G, non-displaced PMI   Ext: No C/C/E Bilaterally. Neuro: Neurovascularly intact w/ Sensory/Motor intact UE/LE Bilaterally. No Known Allergies  Prior to Visit Medications    Medication Sig Taking? Authorizing Provider   ezetimibe-simvastatin (VYTORIN) 10-20 MG per tablet TAKE 1 TABLET BY MOUTH NIGHTLY Yes David Berman MD   allopurinol (ZYLOPRIM) 100 MG tablet TAKE 1 TABLET BY MOUTH DAILY Yes David Berman MD   metoprolol tartrate (LOPRESSOR) 50 MG tablet TAKE 1 TABLET BY MOUTH TWICE DAILY Yes David Berman MD   hydroCHLOROthiazide (HYDRODIURIL) 25 MG tablet TAKE 1 TABLET BY MOUTH DAILY Yes David Berman MD   finasteride (PROSCAR) 5 MG tablet Take 5 mg by mouth daily Yes Historical Provider, MD   terazosin (HYTRIN) 10 MG capsule Take 10 mg by mouth nightly Yes Historical Provider, MD   omeprazole (PRILOSEC) 10 MG delayed release capsule Take 10 mg by mouth daily Takes twice a week Yes Historical Provider, MD   ibuprofen (ADVIL;MOTRIN) 200 MG tablet Take 200 mg by mouth every 6 hours as needed for Pain Yes Historical Provider, MD   docusate sodium (COLACE) 100 MG capsule Take 100 mg by mouth 2 times daily Yes Historical Provider, MD   Cholecalciferol (VITAMIN D) 2000 units CAPS capsule Take by mouth three times a week  Yes Historical Provider, MD   Coenzyme Q10 (CO Q 10 PO) Take  by mouth. Yes Historical Provider, MD   aspirin 325 MG tablet Take 325 mg by mouth daily.    Yes Historical Provider, MD Riddle (Including outside providers/suppliers regularly involved in providing care):   Patient Care Team:  David Berman MD as PCP - General (Family Medicine)  David Berman MD as PCP - [facility]  Chevy Maya MD as Physician (Cardiology)  Padma Neal MD as Consulting Physician (Gastroenterology)    Reviewed and updated this visit:  Tobacco  Allergies  Meds Problems  Med Hx  Surg Hx  Soc Hx  Fam Hx

## 2022-03-21 RX ORDER — ALLOPURINOL 100 MG/1
TABLET ORAL
Qty: 30 TABLET | Refills: 5 | Status: SHIPPED | OUTPATIENT
Start: 2022-03-21 | End: 2022-10-06

## 2022-03-21 NOTE — TELEPHONE ENCOUNTER
Future Appointments    Encounter Information    Provider Department Appt Notes   9/12/2022 Charanjit Eaton MD [facility] Primary Care 6 month follow up       Past Visits    Date Provider Specialty Visit Type Primary Dx   03/10/2022 Charanjit Eaton MD Family Medicine Office Visit Medicare annual wellness visit, subsequent

## 2022-06-07 RX ORDER — METOPROLOL TARTRATE 50 MG/1
TABLET, FILM COATED ORAL
Qty: 180 TABLET | Refills: 2 | Status: SHIPPED | OUTPATIENT
Start: 2022-06-07

## 2022-06-07 RX ORDER — HYDROCHLOROTHIAZIDE 25 MG/1
TABLET ORAL
Qty: 90 TABLET | Refills: 2 | Status: SHIPPED | OUTPATIENT
Start: 2022-06-07

## 2022-06-07 RX ORDER — EZETIMIBE AND SIMVASTATIN 10; 20 MG/1; MG/1
TABLET ORAL
Qty: 30 TABLET | Refills: 2 | Status: SHIPPED | OUTPATIENT
Start: 2022-06-07 | End: 2022-08-15

## 2022-08-14 NOTE — TELEPHONE ENCOUNTER
Future Appointments    Encounter Information    Provider Department Appt Notes   9/12/2022 Nikki Barros MD [facility] Primary Care 6 month follow up       Past Visits    Date Provider Specialty Visit Type Primary Dx   03/10/2022 Nikki Barros MD Family Medicine Office Visit Medicare annual wellness visit, subsequent   01/11/2022 Tatiana Friend MD Cardiology Office Visit Essential hypertension   11/05/2021 Karly American, APRN - CNP Family Medicine Office Visit Viral illness   09/08/2021 Nikki Barros MD Family Medicine Office Visit Hyperuricemia   07/20/2021 Tatiana Friend MD Cardiology Office Visit Essential hypertension

## 2022-08-15 RX ORDER — EZETIMIBE AND SIMVASTATIN 10; 20 MG/1; MG/1
TABLET ORAL
Qty: 30 TABLET | Refills: 2 | Status: SHIPPED | OUTPATIENT
Start: 2022-08-15

## 2022-09-12 ENCOUNTER — OFFICE VISIT (OUTPATIENT)
Dept: FAMILY MEDICINE CLINIC | Age: 77
End: 2022-09-12
Payer: MEDICARE

## 2022-09-12 VITALS
DIASTOLIC BLOOD PRESSURE: 82 MMHG | WEIGHT: 229 LBS | OXYGEN SATURATION: 96 % | BODY MASS INDEX: 39.09 KG/M2 | HEART RATE: 72 BPM | HEIGHT: 64 IN | SYSTOLIC BLOOD PRESSURE: 130 MMHG | TEMPERATURE: 96.8 F

## 2022-09-12 DIAGNOSIS — I10 ESSENTIAL HYPERTENSION: Primary | ICD-10-CM

## 2022-09-12 DIAGNOSIS — N18.30 STAGE 3 CHRONIC KIDNEY DISEASE, UNSPECIFIED WHETHER STAGE 3A OR 3B CKD (HCC): ICD-10-CM

## 2022-09-12 DIAGNOSIS — M19.90 OSTEOARTHRITIS, UNSPECIFIED OSTEOARTHRITIS TYPE, UNSPECIFIED SITE: ICD-10-CM

## 2022-09-12 DIAGNOSIS — E79.0 HYPERURICEMIA: ICD-10-CM

## 2022-09-12 DIAGNOSIS — Z23 NEEDS FLU SHOT: ICD-10-CM

## 2022-09-12 DIAGNOSIS — G47.33 OSA (OBSTRUCTIVE SLEEP APNEA): ICD-10-CM

## 2022-09-12 DIAGNOSIS — R73.03 PREDIABETES: ICD-10-CM

## 2022-09-12 DIAGNOSIS — E78.5 HYPERLIPIDEMIA, UNSPECIFIED HYPERLIPIDEMIA TYPE: ICD-10-CM

## 2022-09-12 DIAGNOSIS — E66.01 SEVERE OBESITY (BMI 35.0-39.9) WITH COMORBIDITY (HCC): ICD-10-CM

## 2022-09-12 PROCEDURE — 1036F TOBACCO NON-USER: CPT | Performed by: FAMILY MEDICINE

## 2022-09-12 PROCEDURE — G8417 CALC BMI ABV UP PARAM F/U: HCPCS | Performed by: FAMILY MEDICINE

## 2022-09-12 PROCEDURE — 1123F ACP DISCUSS/DSCN MKR DOCD: CPT | Performed by: FAMILY MEDICINE

## 2022-09-12 PROCEDURE — 99213 OFFICE O/P EST LOW 20 MIN: CPT | Performed by: FAMILY MEDICINE

## 2022-09-12 PROCEDURE — 90694 VACC AIIV4 NO PRSRV 0.5ML IM: CPT | Performed by: FAMILY MEDICINE

## 2022-09-12 PROCEDURE — G0008 ADMIN INFLUENZA VIRUS VAC: HCPCS | Performed by: FAMILY MEDICINE

## 2022-09-12 PROCEDURE — G8427 DOCREV CUR MEDS BY ELIG CLIN: HCPCS | Performed by: FAMILY MEDICINE

## 2022-09-12 NOTE — PROGRESS NOTES
Chief Complaint   Patient presents with    Hypertension     6 month        HPI:  Navya Allen. is a 68 y.o.     6 month f/u visit. 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      Patient Active Problem List   Diagnoses    GERD (gastroesophageal reflux disease) - controlled at this time    Hyperlipidemia - taking meds without incident, active, watching diet    Hypertension - controlled, normal stress in past few years    JOSE (obstructive sleep apnea) - refused sleep study     Osteoarthritis - general aches and pains - were better with CoQ10    Kidney stones - no attacks    BPH (benign prostatic hypertrophy) - sees urology annually    Cataract - he is following with ophthalmology    Hyperuricemia - no gout, only kidney stone in past,         Review of Systems:   General ROS: some fatigue on occ. Respiratory ROS:no cough, shortness of breath, or wheezing  Cardiovascular ROS: occ palpitations, no chest pain or dyspnea on exertion  Gastrointestinal ROS: no blood in stool, occ constipation, lactulose on occasion, takes large BMs he states  Genito-Urinary ROS:occ trouble voiding, on hytrin, sees urology  Musculoskeletal ROS: knee pains, right knee mostly, hands hurt on occasion - takes occasional aleve/ibuprofen   Some right achilles pain, occ back pain, some left hip pain  Neurological ROS:negative for - behavioral changes, memory loss, numbness/tingling, tremors or weakness  Skin ROS: basal cell, seeing derm    In general patient otherwise reports feeling well.    Doing some exercise    Physical Exam:  /82 (Site: Left Upper Arm)   Pulse 72   Temp 96.8 °F (36 °C)   Ht 5' 4\" (1.626 m)   Wt 229 lb (103.9 kg)   SpO2 96%   BMI 39.31 kg/m²     Gen: Well, NAD, Alert, Oriented x 3   HEENT: EOMI, eyes clear, MMM  Skin: without rash or jaundice  Neck: no significant lymphadenopathy or thyromegaly  Lungs: CTA B w/out Rales/Wheezes/Rhonchi, Good respiratory effort   Heart: RRR, S1S2, w/out M/R/G, non-displaced PMI   Abdomen: Soft NT/ND, w/out R/G, w/ +BSx4 , Diastasis recti  Ext: trace BLE edema   Neuro: Neurovascularly intact w/ Sensory/Motor intact UE/LE Bilaterally. Rectal - deferred - sees urology annually  Psych:generally euthymic    Lab Results   Component Value Date    WBC 5.9 03/10/2022    HGB 15.3 03/10/2022    HCT 46.9 03/10/2022     03/10/2022    CHOL 113 03/10/2022    TRIG 143 03/10/2022    HDL 44 03/10/2022    ALT 29 03/10/2022    AST 26 03/10/2022     03/10/2022    K 4.5 03/10/2022     03/10/2022    CREATININE 1.20 03/10/2022    BUN 16 03/10/2022    CO2 26 03/10/2022    TSH 1.800 01/14/2015    PSA 0.47 03/10/2022    LABA1C 6.4 (H) 03/10/2022           A&P   Diagnosis Orders   1. Essential hypertension        2. Needs flu shot  Influenza, FLUAD, (age 72 y+), IM, Preservative Free, 0.5 mL      3. Hyperuricemia        4. Prediabetes        5. Severe obesity (BMI 35.0-39. 9) with comorbidity ([address]        6. JOSE (obstructive sleep apnea)        7. Osteoarthritis, unspecified osteoarthritis type, unspecified site        8. Hyperlipidemia, unspecified hyperlipidemia type        9. Stage 3 chronic kidney disease, unspecified whether stage 3a or 3b CKD ([address]            No changes to regimen    psa through urology    Cardiology follow up ongoing  Note reviewed    Reviewed all chronic issues   Labs today    Chronic issues are generally well-controlled.      Continue present regimen of medications    Patient Counseling:  --Nutrition: Stressed importance of moderation in sodium/caffeine intake, saturated fat and cholesterol, caloric balance, sufficient intake of fresh fruits, vegetables, fiber-  --Exercise: Stressed the importance of regular exercise - walking on treadmill and with dogs, ([place] mountain dogs)  --Dental health: Discussed importance of regulardental visits  --Immunizations reviewed - suggest shingles vaccine  --Discussed benefits of screening colonoscopy - last 9/20/13  --Discussed pros and cons of prostate CA screening - sees urology, will have PSA and rectal done there      Continue follow ups q6 mos.     Annual follow up with urology    He follows with Parag Segundo MD

## 2022-09-12 NOTE — PROGRESS NOTES
After obtaining consent, and per orders of Dr. Sri Garcia, injection of flu given in Left deltoid by Mayuri Harmon ([facility]). Patient instructed to remain in clinic for 20 minutes afterwards, and to report any adverse reaction to me immediately. Vaccine Information Sheet, \"Influenza - Inactivated\"  given to Lselie Carters., or parent/legal guardian of  Leslie Loyola. and verbalized understanding. Patient responses:    Have you ever had a reaction to a flu vaccine? No  Are you able to eat eggs without adverse effects? Yes  Do you have any current illness? No  Have you ever had Guillian [place] Syndrome? No    Flu vaccine given per order. Please see immunization tab.

## 2022-10-05 NOTE — TELEPHONE ENCOUNTER
Requesting medication refill.  Please approve or deny this request.    Rx requested:  Requested Prescriptions     Pending Prescriptions Disp Refills    allopurinol (ZYLOPRIM) 100 MG tablet [Pharmacy Med Name: allopurinol 100 mg tablet] 30 tablet 5     Sig: TAKE 1 TABLET BY MOUTH DAILY       Last Office Visit:   9/12/2022    Next Visit Date:  Future Appointments   Date Time Provider Venessa Johnson   3/13/2023  9:45 AM Job MD [facility] [place]

## 2022-10-06 RX ORDER — ALLOPURINOL 100 MG/1
TABLET ORAL
Qty: 30 TABLET | Refills: 5 | Status: SHIPPED | OUTPATIENT
Start: 2022-10-06

## 2022-10-26 ENCOUNTER — OFFICE VISIT (OUTPATIENT)
Dept: CARDIOLOGY CLINIC | Age: 77
End: 2022-10-26
Payer: MEDICARE

## 2022-10-26 VITALS
HEART RATE: 58 BPM | HEIGHT: 64 IN | BODY MASS INDEX: 39.5 KG/M2 | OXYGEN SATURATION: 97 % | SYSTOLIC BLOOD PRESSURE: 122 MMHG | DIASTOLIC BLOOD PRESSURE: 78 MMHG | WEIGHT: 231.4 LBS

## 2022-10-26 DIAGNOSIS — Z00.00 PE (PHYSICAL EXAM), ANNUAL: Primary | ICD-10-CM

## 2022-10-26 DIAGNOSIS — R00.2 PALPITATIONS: ICD-10-CM

## 2022-10-26 PROCEDURE — 1123F ACP DISCUSS/DSCN MKR DOCD: CPT | Performed by: INTERNAL MEDICINE

## 2022-10-26 PROCEDURE — 1036F TOBACCO NON-USER: CPT | Performed by: INTERNAL MEDICINE

## 2022-10-26 PROCEDURE — G8417 CALC BMI ABV UP PARAM F/U: HCPCS | Performed by: INTERNAL MEDICINE

## 2022-10-26 PROCEDURE — 99214 OFFICE O/P EST MOD 30 MIN: CPT | Performed by: INTERNAL MEDICINE

## 2022-10-26 PROCEDURE — G8427 DOCREV CUR MEDS BY ELIG CLIN: HCPCS | Performed by: INTERNAL MEDICINE

## 2022-10-26 PROCEDURE — G8484 FLU IMMUNIZE NO ADMIN: HCPCS | Performed by: INTERNAL MEDICINE

## 2022-10-26 PROCEDURE — 93000 ELECTROCARDIOGRAM COMPLETE: CPT | Performed by: INTERNAL MEDICINE

## 2022-10-26 PROCEDURE — 3078F DIAST BP <80 MM HG: CPT | Performed by: INTERNAL MEDICINE

## 2022-10-26 PROCEDURE — 3074F SYST BP LT 130 MM HG: CPT | Performed by: INTERNAL MEDICINE

## 2022-10-26 NOTE — PROGRESS NOTES
10/26/2022    Renate Agudelo MD  [address]  [facility] [address]    RE: Vega Boyce., DPM  :      Dear Dr. Renate Agudelo MD :    I had the pleasure of seeing your patient, Vega Boyce. in the office today on 10/26/2022. 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 He was previously following with Dr. Dusty Nuñez. Currently, reports intermittent palpitations that come and go. Episodes are described as \"racing\" and skipping that last a couple of minutes before gradually resolving. He reports associated shortness of breath but no near syncope or syncope. He denies any chest pain or limiting exertional dyspnea with activity like 30 minutes walking on a treadmill at 2.5 miles per hour 2-3x/week. He does get dizzy with position changes. He denies any prior history of atrial fibrillation. Current medications:   Current Outpatient Medications   Medication Sig Dispense Refill    allopurinol (ZYLOPRIM) 100 MG tablet TAKE 1 TABLET BY MOUTH DAILY 30 tablet 5    ezetimibe-simvastatin (VYTORIN) 10-20 MG per tablet TAKE 1 TABLET BY MOUTH NIGHTLY 30 tablet 2    metoprolol tartrate (LOPRESSOR) 50 MG tablet TAKE 1 TABLET BY MOUTH TWICE DAILY 180 tablet 2    hydroCHLOROthiazide (HYDRODIURIL) 25 MG tablet TAKE 1 TABLET BY MOUTH DAILY 90 tablet 2    finasteride (PROSCAR) 5 MG tablet Take 5 mg by mouth daily      terazosin (HYTRIN) 10 MG capsule Take 10 mg by mouth nightly      omeprazole (PRILOSEC) 10 MG delayed release capsule Take 10 mg by mouth daily Takes twice a week      ibuprofen (ADVIL;MOTRIN) 200 MG tablet Take 200 mg by mouth every 6 hours as needed for Pain      docusate sodium (COLACE) 100 MG capsule Take 100 mg by mouth 2 times daily      Coenzyme Q10 (CO Q 10 PO) Take  by mouth. aspirin 325 MG tablet Take 325 mg by mouth daily.          No current facility-administered medications for this visit. Allergies: Patient has no known allergies. Review of Systems:  General: Fatigued. Cardiovascular: See HPI. No orthopnea or PND. Respiratory: Dyspnea is present with moderate degrees of activity.+ JOSE  Gastrointestinal: No melena or hematochezia. Genitourinary: No hematuria.  + CKD  Hematological: No easy bruising or bleeding. Vascular: No lower extremity edema or claudication. Neurological: No TIA or CVA symptoms. No paresthesias. Musculoskeletal: No chest wall pain.  + Osteoarthritis  Psychiatric: No anxiety. *All other systems were reviewed and found to be negative unless otherwise noted in the HPI*    Physical Examination: His height is 5' 4\" (1.626 m) and weight is 231 lb 6.4 oz (105 kg). His blood pressure is 122/78 and his pulse is 58. His oxygen saturation is 97%. He is alert and oriented to person, place, and time. No distress. Neck is supple without JVD or carotid bruits. No thyroidmegaly. Lungs are clear to auscultation both anteriorly and posteriorly. No accessory muscle usage. Heart is a regular rate and rhythm. No murmurs. No S3 or S4. PMI is nondisplaced. Abdomen is soft and non tender. No hepatosplenomegaly. No abdominal aortic bruits or masses. Lower extremities with 2+ pitting edema and chronic venous changes. No clubbing or cyanosis. Neurologically, cranial nerves are grossly intact. No focal sensory and/or motor deficits. ECG (10/26/2022): Sinus rhythm. Heart rate 60 bpm.    2D Echocardiogram (2015): EF 60%. Stress Test (2015): Negative for ischemia    Lipid panel (3/10/2022):  Total cholesterol 113, triglyceride 143, HDL 44, LDL 40    HgA1c was 6.4% on 3/10/2022     IMPRESSIONS:   Symptomatic palpitations, rule out paroxysmal atrial fibrillation  Symptomatic PACs  Hypertension  Hyperlipidemia  Probable JOSE  Obesity  Chronic venous insufficiency      RECOMMENDATIONS: Dr. Reynaldo Carver is recommended a 2-week event monitor to better evaluate for any pathologic arrhythmias. If atrial fibrillation is confirmed, we will bring him back to discuss treatment options. I also suspect he has underlying obstructive sleep apnea which increases his likelihood of pathologic arrhythmias in the future. I have recommended referral to Dr. Alba Chance for a sleep study. He is agreeable to the plan. Follow-up cardiac evaluation in 1 year, sooner if needed. Thank you very much for allowing me to participate in Mr. Luciana Padron cardiac care. Should you have any questions, please feel free to contact me.      Sincerely,    Chrissy Heard DO, , [facility], [address] [place] [address],[address] and [address] [facility]

## 2022-10-31 ENCOUNTER — OFFICE VISIT (OUTPATIENT)
Dept: PULMONOLOGY | Age: 77
End: 2022-10-31
Payer: MEDICARE

## 2022-10-31 VITALS
OXYGEN SATURATION: 97 % | HEIGHT: 64 IN | WEIGHT: 230 LBS | DIASTOLIC BLOOD PRESSURE: 76 MMHG | BODY MASS INDEX: 39.27 KG/M2 | HEART RATE: 79 BPM | SYSTOLIC BLOOD PRESSURE: 128 MMHG | TEMPERATURE: 97.8 F

## 2022-10-31 DIAGNOSIS — R00.2 PALPITATION: ICD-10-CM

## 2022-10-31 DIAGNOSIS — I10 HYPERTENSION, UNSPECIFIED TYPE: ICD-10-CM

## 2022-10-31 DIAGNOSIS — G47.33 OSA (OBSTRUCTIVE SLEEP APNEA): Primary | ICD-10-CM

## 2022-10-31 PROCEDURE — 99203 OFFICE O/P NEW LOW 30 MIN: CPT | Performed by: INTERNAL MEDICINE

## 2022-10-31 PROCEDURE — 3078F DIAST BP <80 MM HG: CPT | Performed by: INTERNAL MEDICINE

## 2022-10-31 PROCEDURE — G8484 FLU IMMUNIZE NO ADMIN: HCPCS | Performed by: INTERNAL MEDICINE

## 2022-10-31 PROCEDURE — G8427 DOCREV CUR MEDS BY ELIG CLIN: HCPCS | Performed by: INTERNAL MEDICINE

## 2022-10-31 PROCEDURE — 3074F SYST BP LT 130 MM HG: CPT | Performed by: INTERNAL MEDICINE

## 2022-10-31 PROCEDURE — 1123F ACP DISCUSS/DSCN MKR DOCD: CPT | Performed by: INTERNAL MEDICINE

## 2022-10-31 PROCEDURE — G8417 CALC BMI ABV UP PARAM F/U: HCPCS | Performed by: INTERNAL MEDICINE

## 2022-10-31 PROCEDURE — 1036F TOBACCO NON-USER: CPT | Performed by: INTERNAL MEDICINE

## 2022-10-31 NOTE — PROGRESS NOTES
NEW PATIENT VISIT-PULMONARY/SLEEP    10/31/2022     REFERRING PHYSICIAN:  Sasha Kent MD     REASON FOR REFERRAL:  JOSE    HPI:     Segun Low is a 68 y.o. male who was referred to sleep and pulmonary clinic for evaluation. He has been evaluated by cardiology due to palpitation and previous history of PACs. Apparently, his palpitation has becoming more frequent and it has been bothering him. He is known to have history of hypertension hyperlipidemia. He was told that he snores. He has witnessed apneas. He wakes up sometimes gasping for air. 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 He denies any accidents or near misses. Weight has been gradually increasing compared to when he was practicing. Past Medical History   Past Medical History:   Diagnosis Date    Basal cell cancer     multiple,  follows with Dr. Iam Lucas    BPH (benign prostatic hypertrophy)     Cataract     ophthalmologist - Dr. Kayden Lyman - [facility]    Chest pain     Chronic kidney disease     ARRIAGA (dyspnea on exertion)     GERD (gastroesophageal reflux disease)     Hyperlipidemia     Hypertension     Hyperuricemia 9/5/2018    Kidney stones     HX of    Morbidly obese ([address] 3/5/2020    Normal cardiac stress test 1/2015    JOSE (obstructive sleep apnea)     Osteoarthritis     Palpitation     Rectus diastasis 9/10/2014       Past Surgical History  Past Surgical History:   Procedure Laterality Date    COLONOSCOPY  2005    COLONOSCOPY  9/20/13      [address] [place] [address]  2012    cataracts    KNEE SURGERY      Left TKR    NOSE SURGERY      TONSILLECTOMY AND ADENOIDECTOMY         Allergies  No Known Allergies    Medications  Current Outpatient Medications   Medication Sig Dispense Refill    allopurinol (ZYLOPRIM) 100 MG tablet TAKE 1 TABLET BY MOUTH DAILY 30 tablet 5    ezetimibe-simvastatin (VYTORIN) 10-20 MG per tablet TAKE 1 TABLET BY MOUTH NIGHTLY 30 tablet 2    metoprolol tartrate (LOPRESSOR) 50 MG tablet TAKE 1 TABLET BY MOUTH TWICE DAILY 180 tablet 2    hydroCHLOROthiazide (HYDRODIURIL) 25 MG tablet TAKE 1 TABLET BY MOUTH DAILY 90 tablet 2    finasteride (PROSCAR) 5 MG tablet Take 5 mg by mouth daily      terazosin (HYTRIN) 10 MG capsule Take 10 mg by mouth nightly      omeprazole (PRILOSEC) 10 MG delayed release capsule Take 10 mg by mouth daily Takes twice a week      ibuprofen (ADVIL;MOTRIN) 200 MG tablet Take 200 mg by mouth every 6 hours as needed for Pain      docusate sodium (COLACE) 100 MG capsule Take 100 mg by mouth 2 times daily      Coenzyme Q10 (CO Q 10 PO) Take  by mouth. aspirin 325 MG tablet Take 325 mg by mouth daily. No current facility-administered medications for this visit. Social History  Social History     Tobacco Use    Smoking status: Never    Smokeless tobacco: Never   Substance Use Topics    Alcohol use: No       Family History  Family History   Family history unknown: Yes       Review of Systems  All review of systems has been obtained and negative other than what was mentionedin HPI. Physical Exam         Vitals:    10/31/22 1433   BP: 128/76   Pulse: 79   Temp: 97.8 °F (36.6 °C)   TempSrc: Tympanic   SpO2: 97%   Weight: 230 lb (104.3 kg)   Height: 5' 4\" (1.626 m)          General appearance: Well appearing. No acute distress. AAOX3  Head: Normocephalic, without obvious abnormality, atraumatic   Eyes:Pupils bilateral equal and reactive, EOM intact. Normal sclera and conjunctiva   Nose: Mucosa pink  Throat: Clear,  [place] 2  Neck: Supple, No JVD. Nothyromegaly. Neck is thick  Lungs: Clear bilaterally. No wheezing. No crackles. No use of accessory muscles. Heart: RRR, S1, S2 normal, no murmur, click, rub or gallop   Abdomen: soft, non-tender, nondistended. Bowel sounds normal. No hernia. No organomegaly.    Extremities: extremities normal, atraumatic, no cyanosis, no edema  Skin: Skin color, texture, turgor normal. No rashes or lesions Neurological: No focal deficits,cranial nerves grossly intact. No weakness. Sensation normal   Psych: Normal Mood  Musculoskeletal: No joint abnormalities. Impression:   Diagnosis Orders   1. JOSE (obstructive sleep apnea)  Home Sleep Study      2. Palpitation        3. Hypertension, unspecified type              Orders Placed This Encounter   Procedures    Home Sleep Study     Standing Status:   Future     Standing Expiration Date:   1/31/2023     Order Specific Question:   Location For Sleep Study     Answer:   [place]     Order Specific Question:   [facility] Sleep [facility]     [facility]        Recommendations:     -Concern for sleep disordered breathing. We will proceed with home sleep study.  -Weight loss and exercise has been advised.  -No driving if sleepy or drowsy or otherwise impaired.  -Work-up for palpitation per cardiology. Plan for Holter monitor soon. No follow-ups on file. Phone visit in 6-8 weeks to discuss results.        Electronically signed by Sharif Sosa MD on 10/31/2022 at 3:12 PM

## 2022-11-22 ENCOUNTER — HOSPITAL ENCOUNTER (OUTPATIENT)
Dept: NON INVASIVE DIAGNOSTICS | Age: 77
Discharge: HOME | End: 2022-11-22
Payer: MEDICARE

## 2022-11-22 DIAGNOSIS — R00.2 PALPITATIONS: ICD-10-CM

## 2022-11-22 PROCEDURE — 93270 REMOTE 30 DAY ECG REV/REPORT: CPT

## 2022-12-08 RX ORDER — EZETIMIBE AND SIMVASTATIN 10; 20 MG/1; MG/1
TABLET ORAL
Qty: 30 TABLET | Refills: 2 | Status: SHIPPED | OUTPATIENT
Start: 2022-12-08

## 2022-12-08 NOTE — TELEPHONE ENCOUNTER
Future Appointments    Encounter Information    Provider Department Appt Notes   3/13/2023 Sonya Sood MD [facility] Primary Care 6 mo fu     Past Visits    Date Provider Specialty Visit Type Primary Dx   09/12/2022 Sonya Sood MD Family Medicine Office Visit Essential hypertension

## 2022-12-27 NOTE — PROCEDURES
[address]                      [address], [address] [facility]                                 EVENT MONITOR    PATIENT NAME: Hugo Navarro                      :        1945  MED REC NO:   [id number]                            ROOM:  ACCOUNT NO:   [de-identified]                           ADMIT DATE: 2022  PROVIDER:     Aline Mccracken DO    15-DAY EVENT MONITOR    INDICATION:  Palpitations. ASSESSMENT:  1. The patient with baseline sinus rhythm. 2.  Short PSVT episodes. 3.  No malignant tachy or bradyarrhythmias. 4.  Good heart rate variability. 5.  Palpitations mostly correlated with the short runs of PSVT.         Steve Madrid DO    D: 2022 17:55:29       T: 2022 21:21:01     [email or web address]  Job#: [id number]     Doc#: [id number]    CC:

## 2023-03-07 RX ORDER — HYDROCHLOROTHIAZIDE 25 MG/1
TABLET ORAL
Qty: 90 TABLET | Refills: 2 | Status: SHIPPED | OUTPATIENT
Start: 2023-03-07

## 2023-03-07 RX ORDER — EZETIMIBE AND SIMVASTATIN 10; 20 MG/1; MG/1
TABLET ORAL
Qty: 30 TABLET | Refills: 2 | Status: SHIPPED | OUTPATIENT
Start: 2023-03-07

## 2023-03-07 RX ORDER — METOPROLOL TARTRATE 50 MG/1
TABLET, FILM COATED ORAL
Qty: 180 TABLET | Refills: 2 | Status: SHIPPED | OUTPATIENT
Start: 2023-03-07

## 2023-03-07 NOTE — TELEPHONE ENCOUNTER
Requesting medication refill. Please approve or deny this request.    Rx requested:  Requested Prescriptions     Pending Prescriptions Disp Refills    metoprolol tartrate (LOPRESSOR) 50 MG tablet [Pharmacy Med Name: metoprolol tartrate 50 mg tablet] 180 tablet 2     Sig: TAKE 1 TABLET BY MOUTH TWICE DAILY    hydroCHLOROthiazide (HYDRODIURIL) 25 MG tablet [Pharmacy Med Name: hydrochlorothiazide 25 mg tablet] 90 tablet 2     Sig: TAKE 1 TABLET BY MOUTH DAILY    ezetimibe-simvastatin (VYTORIN) 10-20 MG per tablet [Pharmacy Med Name: ezetimibe 10 mg-simvastatin 20 mg tablet] 30 tablet 2     Sig: TAKE 1 TABLET BY MOUTH NIGHTLY       Last Office Visit:   9/12/2022    Next Visit Date:  Future Appointments   Date Time Provider Department Center   3/13/2023  9:45 AM Berto Santizo MD [facility]   10/30/2023 12:00 PM DO Rosario Elliott

## 2023-03-13 ENCOUNTER — OFFICE VISIT (OUTPATIENT)
Dept: FAMILY MEDICINE CLINIC | Age: 78
End: 2023-03-13
Payer: MEDICARE

## 2023-03-13 VITALS
HEART RATE: 49 BPM | HEIGHT: 64 IN | OXYGEN SATURATION: 96 % | DIASTOLIC BLOOD PRESSURE: 78 MMHG | TEMPERATURE: 96.3 F | SYSTOLIC BLOOD PRESSURE: 138 MMHG | BODY MASS INDEX: 39.27 KG/M2 | WEIGHT: 230 LBS

## 2023-03-13 DIAGNOSIS — I47.1 PSVT (PAROXYSMAL SUPRAVENTRICULAR TACHYCARDIA) (HCC): ICD-10-CM

## 2023-03-13 DIAGNOSIS — Z12.5 SCREENING PSA (PROSTATE SPECIFIC ANTIGEN): ICD-10-CM

## 2023-03-13 DIAGNOSIS — I10 ESSENTIAL HYPERTENSION: ICD-10-CM

## 2023-03-13 DIAGNOSIS — R73.03 PREDIABETES: ICD-10-CM

## 2023-03-13 DIAGNOSIS — E79.0 HYPERURICEMIA: ICD-10-CM

## 2023-03-13 DIAGNOSIS — G47.33 OSA (OBSTRUCTIVE SLEEP APNEA): ICD-10-CM

## 2023-03-13 DIAGNOSIS — E79.0 HYPERURICEMIA: Primary | ICD-10-CM

## 2023-03-13 DIAGNOSIS — N18.30 STAGE 3 CHRONIC KIDNEY DISEASE, UNSPECIFIED WHETHER STAGE 3A OR 3B CKD (HCC): ICD-10-CM

## 2023-03-13 DIAGNOSIS — E66.01 SEVERE OBESITY (BMI 35.0-39.9) WITH COMORBIDITY (HCC): ICD-10-CM

## 2023-03-13 PROBLEM — N18.9 CHRONIC KIDNEY DISEASE: Status: ACTIVE | Noted: 2022-09-12

## 2023-03-13 PROBLEM — I47.10 PSVT (PAROXYSMAL SUPRAVENTRICULAR TACHYCARDIA): Status: ACTIVE | Noted: 2023-03-13

## 2023-03-13 LAB
ALBUMIN SERPL-MCNC: 4.3 G/DL (ref 3.5–4.6)
ALP BLD-CCNC: 71 U/L (ref 35–104)
ALT SERPL-CCNC: 25 U/L (ref 0–41)
ANION GAP SERPL CALCULATED.3IONS-SCNC: 11 MEQ/L (ref 9–15)
AST SERPL-CCNC: 25 U/L (ref 0–40)
BILIRUB SERPL-MCNC: 0.5 MG/DL (ref 0.2–0.7)
BUN BLDV-MCNC: 22 MG/DL (ref 8–23)
CALCIUM SERPL-MCNC: 9.3 MG/DL (ref 8.5–9.9)
CHLORIDE BLD-SCNC: 101 MEQ/L (ref 95–107)
CHOLESTEROL, TOTAL: 117 MG/DL (ref 0–199)
CO2: 26 MEQ/L (ref 20–31)
CREAT SERPL-MCNC: 1.35 MG/DL (ref 0.7–1.2)
GFR SERPL CREATININE-BSD FRML MDRD: 53.8 ML/MIN/{1.73_M2}
GLOBULIN: 3.1 G/DL (ref 2.3–3.5)
GLUCOSE BLD-MCNC: 120 MG/DL (ref 70–99)
HBA1C MFR BLD: 6.5 % (ref 4.8–5.9)
HCT VFR BLD CALC: 45.3 % (ref 42–52)
HDLC SERPL-MCNC: 39 MG/DL (ref 40–59)
HEMOGLOBIN: 15.3 G/DL (ref 14–18)
LDL CHOLESTEROL CALCULATED: 42 MG/DL (ref 0–129)
MCH RBC QN AUTO: 31.7 PG (ref 27–31.3)
MCHC RBC AUTO-ENTMCNC: 33.9 % (ref 33–37)
MCV RBC AUTO: 93.7 FL (ref 79–92.2)
PDW BLD-RTO: 13.6 % (ref 11.5–14.5)
PLATELET # BLD: 166 K/UL (ref 130–400)
POTASSIUM SERPL-SCNC: 4.4 MEQ/L (ref 3.4–4.9)
PROSTATE SPECIFIC ANTIGEN: 0.45 NG/ML (ref 0–4)
RBC # BLD: 4.83 M/UL (ref 4.7–6.1)
SODIUM BLD-SCNC: 138 MEQ/L (ref 135–144)
TOTAL PROTEIN: 7.4 G/DL (ref 6.3–8)
TRIGL SERPL-MCNC: 181 MG/DL (ref 0–150)
URIC ACID, SERUM: 6.1 MG/DL (ref 3.4–7)
WBC # BLD: 6.9 K/UL (ref 4.8–10.8)

## 2023-03-13 PROCEDURE — G8417 CALC BMI ABV UP PARAM F/U: HCPCS | Performed by: FAMILY MEDICINE

## 2023-03-13 PROCEDURE — 1036F TOBACCO NON-USER: CPT | Performed by: FAMILY MEDICINE

## 2023-03-13 PROCEDURE — 99214 OFFICE O/P EST MOD 30 MIN: CPT | Performed by: FAMILY MEDICINE

## 2023-03-13 PROCEDURE — 3075F SYST BP GE 130 - 139MM HG: CPT | Performed by: FAMILY MEDICINE

## 2023-03-13 PROCEDURE — G8427 DOCREV CUR MEDS BY ELIG CLIN: HCPCS | Performed by: FAMILY MEDICINE

## 2023-03-13 PROCEDURE — 1123F ACP DISCUSS/DSCN MKR DOCD: CPT | Performed by: FAMILY MEDICINE

## 2023-03-13 PROCEDURE — 3078F DIAST BP <80 MM HG: CPT | Performed by: FAMILY MEDICINE

## 2023-03-13 PROCEDURE — 3288F FALL RISK ASSESSMENT DOCD: CPT | Performed by: FAMILY MEDICINE

## 2023-03-13 PROCEDURE — G8484 FLU IMMUNIZE NO ADMIN: HCPCS | Performed by: FAMILY MEDICINE

## 2023-03-13 SDOH — ECONOMIC STABILITY: FOOD INSECURITY: WITHIN THE PAST 12 MONTHS, THE FOOD YOU BOUGHT JUST DIDN'T LAST AND YOU DIDN'T HAVE MONEY TO GET MORE.: NEVER TRUE

## 2023-03-13 SDOH — ECONOMIC STABILITY: FOOD INSECURITY: WITHIN THE PAST 12 MONTHS, YOU WORRIED THAT YOUR FOOD WOULD RUN OUT BEFORE YOU GOT MONEY TO BUY MORE.: NEVER TRUE

## 2023-03-13 SDOH — ECONOMIC STABILITY: HOUSING INSECURITY
IN THE LAST 12 MONTHS, WAS THERE A TIME WHEN YOU DID NOT HAVE A STEADY PLACE TO SLEEP OR SLEPT IN A SHELTER (INCLUDING NOW)?: NO

## 2023-03-13 SDOH — ECONOMIC STABILITY: INCOME INSECURITY: HOW HARD IS IT FOR YOU TO PAY FOR THE VERY BASICS LIKE FOOD, HOUSING, MEDICAL CARE, AND HEATING?: NOT HARD AT ALL

## 2023-03-13 ASSESSMENT — PATIENT HEALTH QUESTIONNAIRE - PHQ9
2. FEELING DOWN, DEPRESSED OR HOPELESS: 0
SUM OF ALL RESPONSES TO PHQ9 QUESTIONS 1 & 2: 0
SUM OF ALL RESPONSES TO PHQ QUESTIONS 1-9: 0
SUM OF ALL RESPONSES TO PHQ QUESTIONS 1-9: 0
1. LITTLE INTEREST OR PLEASURE IN DOING THINGS: 0
SUM OF ALL RESPONSES TO PHQ QUESTIONS 1-9: 0
SUM OF ALL RESPONSES TO PHQ QUESTIONS 1-9: 0

## 2023-03-13 NOTE — PROGRESS NOTES
Chief Complaint   Patient presents with    Hypertension     6 month        HPI:  Melissa Kong. is a 68 y.o.     6 month f/u visit. 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      Patient Active Problem List   Diagnoses    GERD (gastroesophageal reflux disease) - controlled at this time    Hyperlipidemia - taking meds without incident, active, watching diet    Hypertension - controlled, normal stress in past few years    JOSE (obstructive sleep apnea) - refused sleep study     Osteoarthritis - general aches and pains - were better with CoQ10    Kidney stones - no attacks    BPH (benign prostatic hypertrophy) - sees urology annually    Cataract - he is following with ophthalmology    Hyperuricemia - no gout, only kidney stone in past,         Review of Systems:   General ROS: some fatigue on occ. Respiratory ROS:no cough, shortness of breath, or wheezing  Cardiovascular ROS: occ palpitations, no chest pain or dyspnea on exertion  Gastrointestinal ROS: no blood in stool, occ constipation, lactulose on occasion, takes large BMs he states  Genito-Urinary ROS:occ trouble voiding, on hytrin, sees urology  Musculoskeletal ROS: knee pains, right knee mostly, hands hurt on occasion - takes occasional aleve/ibuprofen   Some right achilles pain, occ back pain, some left hip pain  Neurological ROS:negative for - behavioral changes, memory loss, numbness/tingling, tremors or weakness  Skin ROS: basal cell, seeing derm    In general patient otherwise reports feeling well.    Doing some exercise    Physical Exam:  /78 (Site: Left Upper Arm)   Pulse (!) 49   Temp (!) 96.3 °F (35.7 °C)   Ht 5' 4\" (1.626 m)   Wt 230 lb (104.3 kg) SpO2 96%   BMI 39.48 kg/m²     Gen: Well, NAD, Alert, Oriented x 3   HEENT: EOMI, eyes clear, MMM  Skin: without rash or jaundice  Neck: no significant lymphadenopathy or thyromegaly  Lungs: CTA B w/out Rales/Wheezes/Rhonchi, Good respiratory effort   Heart: RRR, S1S2, w/out M/R/G, non-displaced PMI   Abdomen: Soft NT/ND, w/out R/G, w/ +BSx4 , Diastasis recti  Ext: trace BLE edema   Neuro: Neurovascularly intact w/ Sensory/Motor intact UE/LE Bilaterally. Rectal - deferred - sees urology annually  Psych:generally euthymic    Lab Results   Component Value Date    WBC 5.9 03/10/2022    HGB 15.3 03/10/2022    HCT 46.9 03/10/2022     03/10/2022    CHOL 113 03/10/2022    TRIG 143 03/10/2022    HDL 44 03/10/2022    ALT 29 03/10/2022    AST 26 03/10/2022     03/10/2022    K 4.5 03/10/2022     03/10/2022    CREATININE 1.20 03/10/2022    BUN 16 03/10/2022    CO2 26 03/10/2022    TSH 1.800 01/14/2015    PSA 0.47 03/10/2022    LABA1C 6.4 (H) 03/10/2022           A&P   Diagnosis Orders   1. Hyperuricemia  Uric Acid      2. Stage 3 chronic kidney disease, unspecified whether stage 3a or 3b CKD ([facility]        3. Prediabetes  Hemoglobin A1C      4. Severe obesity (BMI 35.0-39. 9) with comorbidity ([facility]        5. Essential hypertension  Lipid Panel    Comprehensive Metabolic Panel    CBC      6. JOSE (obstructive sleep apnea)        7. Screening PSA (prostate specific antigen)  PSA Screening      8. PSVT (paroxysmal supraventricular tachycardia) (HCC)            No changes to regimen    psa through urology    Cardiology follow up ongoing  Note reviewed    Reviewed all chronic issues   Labs today    Chronic issues are generally well-controlled.      Continue present regimen of medications    Patient Counseling:  --Nutrition: Stressed importance of moderation in sodium/caffeine intake, saturated fat and cholesterol, caloric balance, sufficient intake of fresh fruits, vegetables, fiber-  --Exercise: Stressed the importance of regular exercise - walking on treadmill and with dogs, ([place] mountain dogs)  --Dental health: Discussed importance of regulardental visits  --Immunizations reviewed - suggest shingles vaccine  --Discussed benefits of screening colonoscopy - last 9/20/13  --Discussed pros and cons of prostate CA screening - sees urology, will have PSA and rectal done there      Continue follow ups q6 mos.     Annual follow up with urology    He follows with Oksana Perera MD

## 2023-03-15 ENCOUNTER — TELEMEDICINE (OUTPATIENT)
Dept: FAMILY MEDICINE CLINIC | Age: 78
End: 2023-03-15

## 2023-03-15 ENCOUNTER — TELEPHONE (OUTPATIENT)
Dept: FAMILY MEDICINE CLINIC | Age: 78
End: 2023-03-15

## 2023-03-15 DIAGNOSIS — Z00.00 MEDICARE ANNUAL WELLNESS VISIT, SUBSEQUENT: Primary | ICD-10-CM

## 2023-03-15 ASSESSMENT — PATIENT HEALTH QUESTIONNAIRE - PHQ9
SUM OF ALL RESPONSES TO PHQ QUESTIONS 1-9: 0
2. FEELING DOWN, DEPRESSED OR HOPELESS: 0
1. LITTLE INTEREST OR PLEASURE IN DOING THINGS: 0
SUM OF ALL RESPONSES TO PHQ QUESTIONS 1-9: 0
SUM OF ALL RESPONSES TO PHQ9 QUESTIONS 1 & 2: 0

## 2023-03-15 ASSESSMENT — LIFESTYLE VARIABLES
HOW MANY STANDARD DRINKS CONTAINING ALCOHOL DO YOU HAVE ON A TYPICAL DAY: PATIENT DOES NOT DRINK
HOW OFTEN DO YOU HAVE A DRINK CONTAINING ALCOHOL: NEVER

## 2023-03-15 NOTE — PROGRESS NOTES
Medicare Annual Wellness Visit    Feliberto Lin is here for Medicare AWV (2023)    Assessment & Plan   Medicare annual wellness visit, subsequent      Recommendations for Preventive Services Due: see orders and patient instructions/AVS.  Recommended screening schedule for the next 5-10 years is provided to the patient in written form: see Patient Instructions/AVS.     Return for Medicare Annual Wellness Visit in 1 year, follow up with PCP. Subjective     Pt declines any issues today. Patient's complete Health Risk Assessment and screening values have been reviewed and are found in Flowsheets. The following problems were reviewed today and where indicated follow up appointments were made and/or referrals ordered. Positive Risk Factor Screenings with Interventions:                 Weight and Activity:  Physical Activity: Insufficiently Active    Days of Exercise per Week: 3 days    Minutes of Exercise per Session: 30 min     On average, how many days per week do you engage in moderate to strenuous exercise (like a brisk walk)?: 3 days  Have you lost any weight without trying in the past 3 months?: No       Obesity Interventions:  Patient declines any further evaluation or treatment           Hearing Screen:  Do you or your family notice any trouble with your hearing that hasn't been managed with hearing aids?: (!) Yes    Interventions:  Patient declines any further evaluation or treatment                       Objective      Patient-Reported Vitals  BP Observations: No, remote/electronic monitoring device was not used or able to be verified  Patient-Reported Weight: 230 lbs  Patient-Reported Height: 5ft4       No physical exam completed today as  pt and provider completed VV      No Known Allergies  Prior to Visit Medications    Medication Sig Taking?  Authorizing Provider   Multiple Vitamins-Minerals (MULTI COMPLETE) CAPS Take by mouth Yes Historical Provider, MD   metoprolol tartrate (LOPRESSOR) 50 MG tablet TAKE 1 TABLET BY MOUTH TWICE DAILY Yes Cuca Doss MD   hydroCHLOROthiazide (HYDRODIURIL) 25 MG tablet TAKE 1 TABLET BY MOUTH DAILY Yes Cuca Doss MD   ezetimibe-simvastatin (VYTORIN) 10-20 MG per tablet TAKE 1 TABLET BY MOUTH NIGHTLY Yes Cuca Doss MD   allopurinol (ZYLOPRIM) 100 MG tablet TAKE 1 TABLET BY MOUTH DAILY Yes Cuca Doss MD   finasteride (PROSCAR) 5 MG tablet Take 5 mg by mouth daily Yes Historical Provider, MD   terazosin (HYTRIN) 10 MG capsule Take 10 mg by mouth nightly Yes Historical Provider, MD   omeprazole (PRILOSEC) 10 MG delayed release capsule Take 10 mg by mouth daily Takes twice a week Yes Historical Provider, MD   ibuprofen (ADVIL;MOTRIN) 200 MG tablet Take 200 mg by mouth every 6 hours as needed for Pain Yes Historical Provider, MD   docusate sodium (COLACE) 100 MG capsule Take 100 mg by mouth 2 times daily Yes Historical Provider, MD   Coenzyme Q10 (CO Q 10 PO) Take  by mouth. Yes Historical Provider, MD   aspirin 325 MG tablet Take 325 mg by mouth daily. Yes Historical Provider, MD Riddle (Including outside providers/suppliers regularly involved in providing care):   Patient Care Team:  Cuca Doss MD as PCP - General (Family Medicine)  Cuca Doss MD as PCP - Empaneled Provider  Coty Gandhi MD as Physician (Cardiology)  Grant Vargas MD as Consulting Physician (Gastroenterology)     Reviewed and updated this visit:  Tobacco  Allergies  Meds  Problems  Med Hx  Surg Hx  Soc Hx  Fam Hx           Grisel Fare., was evaluated through a synchronous (real-time) vv encounter. The patient (or guardian if applicable) is aware that this is a billable service, which includes applicable co-pays. This Virtual Visit was conducted with patient's (and/or legal guardian's) consent.  The visit was conducted pursuant to the emergency declaration under the [facility] [address] waiver authority and the [facility] and Response Supplemental Appropriations Act. Patient identification was verified, and a caregiver was present when appropriate.    The patient was located at Home: [address] [place] [address]  Provider was located at [facility] (Appt Dept): [address] [facility], [address] [place] [address], [address]  [facility],  [address] [facility]        ROBER Barrera CNP

## 2023-03-15 NOTE — PATIENT INSTRUCTIONS
Advance Directives: Care Instructions  Overview  An advance directive is a legal way to state your wishes at the end of your life. It tells your family and your doctor what to do if you can't say what you want. There are two main types of advance directives. You can change them any time your wishes change. Living will. This form tells your family and your doctor your wishes about life support and other treatment. The form is also called a declaration. Medical power of . This form lets you name a person to make treatment decisions for you when you can't speak for yourself. This person is called a health care agent (health care proxy, health care surrogate). The form is also called a durable power of  for health care. If you do not have an advance directive, decisions about your medical care may be made by a family member, or by a doctor or a  who doesn't know you. It may help to think of an advance directive as a gift to the people who care for you. If you have one, they won't have to make tough decisions by themselves. For more information, including forms for your state, see the [address] website (www.caringinfo.org/planning/advance-directives/). Follow-up care is a key part of your treatment and safety. Be sure to make and go to all appointments, and call your doctor if you are having problems. It's also a good idea to know your test results and keep a list of the medicines you take. What should you include in an advance directive? Many states have a unique advance directive form. (It may ask you to address specific issues.) Or you might use a universal form that's approved by many states. If your form doesn't tell you what to address, it may be hard to know what to include in your advance directive. Use the questions below to help you get started. Who do you want to make decisions about your medical care if you are not able to?   What life-support measures do you want if you have a serious illness that gets worse over time or can't be cured? What are you most afraid of that might happen? (Maybe you're afraid of having pain, losing your independence, or being kept alive by machines.)  Where would you prefer to die? (Your home? A hospital? A nursing home?)  Do you want to donate your organs when you die? Do you want certain Church practices performed before you die? When should you call for help? Be sure to contact your doctor if you have any questions. Where can you learn more? Go to http://www.workman.com/ and enter R264 to learn more about \"Advance Directives: Care Instructions. \"  Current as of: June 16, 2022               Content Version: 13.5  © 2322-9756 Healthwise, [employer]. Care instructions adapted under license by [facility] ([facility]). If you have questions about a medical condition or this instruction, always ask your healthcare professional. [place] [address] any warranty or liability for your use of this information. Starting a Weight Loss Plan: Care Instructions  Overview     If you're thinking about losing weight, it can be hard to know where to start. Your doctor can help you set up a weight loss plan that best meets your needs. You may want to take a class on nutrition or exercise, or you could join a weight loss support group. If you have questions about how to make changes to your eating or exercise habits, ask your doctor about seeing a registered dietitian or an exercise specialist.  It can be a big challenge to lose weight. But you don't have to make huge changes at once. Make small changes, and stick with them. When those changes become habit, add a few more changes. If you don't think you're ready to make changes right now, try to pick a date in the future. Make an appointment to see your doctor to discuss whether the time is right for you to start a plan. Follow-up care is a key part of your treatment and safety.  Be sure to make and go to all appointments, and call your doctor if you are having problems. It's also a good idea to know your test results and keep a list of the medicines you take. How can you care for yourself at home? Set realistic goals. Many people expect to lose much more weight than is likely. A weight loss of 5% to 10% of your body weight may be enough to improve your health. Get family and friends involved to provide support. Talk to them about why you are trying to lose weight, and ask them to help. They can help by participating in exercise and having meals with you, even if they may be eating something different. Find what works best for you. If you do not have time or do not like to cook, a program that offers meal replacement bars or shakes may be better for you. Or if you like to prepare meals, finding a plan that includes daily menus and recipes may be best.  Ask your doctor about other health professionals who can help you achieve your weight loss goals. A dietitian can help you make healthy changes in your diet. An exercise specialist or  can help you develop a safe and effective exercise program.  A counselor or psychiatrist can help you cope with issues such as depression, anxiety, or family problems that can make it hard to focus on weight loss. Consider joining a support group for people who are trying to lose weight. Your doctor can suggest groups in your area. Where can you learn more? Go to http://www.woods.com/ and enter U357 to learn more about \"Starting a Weight Loss Plan: Care Instructions. \"  Current as of: August 25, 2022               Content Version: 13.5  © 2006-2022 Healthwise, Inc[employer]. Care instructions adapted under license by [facility] ([facility]).  If you have questions about a medical condition or this instruction, always ask your healthcare professional. [address] any warranty or liability for your use of this information. A Healthy Heart: Care Instructions  Your Care Instructions     Coronary artery disease, also called heart disease, occurs when a substance called plaque builds up in the vessels that supply oxygen-rich blood to your heart muscle. This can narrow the blood vessels and reduce blood flow. A heart attack happens when blood flow is completely blocked. A high-fat diet, smoking, and other factors increase the risk of heart disease. Your doctor has found that you have a chance of having heart disease. You can do lots of things to keep your heart healthy. It may not be easy, but you can change your diet, exercise more, and quit smoking. These steps really work to lower your chance of heart disease. Follow-up care is a key part of your treatment and safety. Be sure to make and go to all appointments, and call your doctor if you are having problems. It's also a good idea to know your test results and keep a list of the medicines you take. How can you care for yourself at home? Diet    Use less salt when you cook and eat. This helps lower your blood pressure. Taste food before salting. Add only a little salt when you think you need it. With time, your taste buds will adjust to less salt.     Eat fewer snack items, fast foods, canned soups, and other high-salt, high-fat, processed foods.     Read food labels and try to avoid saturated and trans fats. They increase your risk of heart disease by raising cholesterol levels.     Limit the amount of solid fat-butter, margarine, and shortening-you eat. Use olive, peanut, or canola oil when you cook. Bake, broil, and steam foods instead of frying them.     Eat a variety of fruit and vegetables every day. Dark green, deep orange, red, or yellow fruits and vegetables are especially good for you. Examples include spinach, carrots, peaches, and berries.     Foods high in fiber can reduce your cholesterol and provide important vitamins and minerals.  High-fiber foods include whole-grain cereals and breads, oatmeal, beans, brown rice, citrus fruits, and apples.     Eat lean proteins. Heart-healthy proteins include seafood, lean meats and poultry, eggs, beans, peas, nuts, seeds, and soy products.     Limit drinks and foods with added sugar. These include candy, desserts, and soda pop. Lifestyle changes    If your doctor recommends it, get more exercise. Walking is a good choice. Bit by bit, increase the amount you walk every day. Try for at least 30 minutes on most days of the week. You also may want to swim, bike, or do other activities.     Do not smoke. If you need help quitting, talk to your doctor about stop-smoking programs and medicines. These can increase your chances of quitting for good. Quitting smoking may be the most important step you can take to protect your heart. It is never too late to quit.     Limit alcohol to 2 drinks a day for men and 1 drink a day for women. Too much alcohol can cause health problems.     Manage other health problems such as diabetes, high blood pressure, and high cholesterol. If you think you may have a problem with alcohol or drug use, talk to your doctor. Medicines    Take your medicines exactly as prescribed. Call your doctor if you think you are having a problem with your medicine.     If your doctor recommends aspirin, take the amount directed each day. Make sure you take aspirin and not another kind of pain reliever, such as acetaminophen (Tylenol). When should you call for help? Call 911 if you have symptoms of a heart attack. These may include:    Chest pain or pressure, or a strange feeling in the chest.     Sweating.     Shortness of breath.     Pain, pressure, or a strange feeling in the back, neck, jaw, or upper belly or in one or both shoulders or arms.     Lightheadedness or sudden weakness.     A fast or irregular heartbeat.    After you call 911, the  may tell you to chew 1 adult-strength or 2 to 4 low-dose aspirin. Wait for an ambulance. Do not try to drive yourself. Watch closely for changes in your health, and be sure to contact your doctor if you have any problems. Where can you learn more? Go to http://www.workman.com/ and enter F075 to learn more about \"A Healthy Heart: Care Instructions. \"  Current as of: September 7, 2022               Content Version: 13.5  © 2006-2022 [employer]. Care instructions adapted under license by [facility] ([facility]). If you have questions about a medical condition or this instruction, always ask your healthcare professional. [place] [address] any warranty or liability for your use of this information. Personalized Preventive Plan for Melissa Kong. - 3/15/2023  Medicare offers a range of preventive health benefits. Some of the tests and screenings are paid in full while other may be subject to a deductible, co-insurance, and/or copay. Some of these benefits include a comprehensive review of your medical history including lifestyle, illnesses that may run in your family, and various assessments and screenings as appropriate. After reviewing your medical record and screening and assessments performed today your provider may have ordered immunizations, labs, imaging, and/or referrals for you. A list of these orders (if applicable) as well as your Preventive Care list are included within your After Visit Summary for your review. Other Preventive Recommendations:    A preventive eye exam performed by an eye specialist is recommended every 1-2 years to screen for glaucoma; cataracts, macular degeneration, and other eye disorders. A preventive dental visit is recommended every 6 months. Try to get at least 150 minutes of exercise per week or 10,000 steps per day on a pedometer . Order or download the FREE \"Exercise & Physical Activity: Your Everyday Guide\" from [employer].  Call 9-215.344.6874 or search [employer] online. You need 7491-1752 mg of calcium and 6725-6502 IU of vitamin D per day. It is possible to meet your calcium requirement with diet alone, but a vitamin D supplement is usually necessary to meet this goal.  When exposed to the sun, use a sunscreen that protects against both UVA and UVB radiation with an SPF of 30 or greater. Reapply every 2 to 3 hours or after sweating, drying off with a towel, or swimming. Always wear a seat belt when traveling in a car. Always wear a helmet when riding a bicycle or motorcycle.

## 2023-05-15 RX ORDER — EZETIMIBE AND SIMVASTATIN 10; 20 MG/1; MG/1
TABLET ORAL
Qty: 30 TABLET | Refills: 2 | Status: SHIPPED | OUTPATIENT
Start: 2023-05-15

## 2023-06-22 NOTE — TELEPHONE ENCOUNTER
Has a[[t 9/8/21 Dapsone Pregnancy And Lactation Text: This medication is Pregnancy Category C and is not considered safe during pregnancy or breast feeding.

## 2023-08-10 RX ORDER — EZETIMIBE AND SIMVASTATIN 10; 20 MG/1; MG/1
TABLET ORAL
Qty: 30 TABLET | Refills: 2 | Status: SHIPPED | OUTPATIENT
Start: 2023-08-10

## 2023-08-10 NOTE — TELEPHONE ENCOUNTER
Comments:     Last Office Visit (last PCP visit):   3/13/2023    Next Visit Date:  Future Appointments   Date Time Provider [address]   9/19/2023 10:15 AM [facility]   10/30/2023 12:00 PM Rose Stanford [facility]       **If hasn't been seen in over a year OR hasn't followed up according to last diabetes/ADHD visit, make appointment for patient before sending refill to provider.     Rx requested:  Requested Prescriptions     Pending Prescriptions Disp Refills    ezetimibe-simvastatin (VYTORIN) 10-20 MG per tablet [Pharmacy Med Name: ezetimibe 10 mg-simvastatin 20 mg tablet] 30 tablet 2     Sig: TAKE 1 TABLET BY MOUTH NIGHTLY

## 2023-08-30 ENCOUNTER — OFFICE VISIT (OUTPATIENT)
Dept: CARDIOLOGY CLINIC | Age: 78
End: 2023-08-30
Payer: MEDICARE

## 2023-08-30 VITALS
HEART RATE: 72 BPM | WEIGHT: 232.4 LBS | OXYGEN SATURATION: 97 % | SYSTOLIC BLOOD PRESSURE: 136 MMHG | BODY MASS INDEX: 39.89 KG/M2 | DIASTOLIC BLOOD PRESSURE: 80 MMHG

## 2023-08-30 DIAGNOSIS — Z00.00 PE (PHYSICAL EXAM), ANNUAL: ICD-10-CM

## 2023-08-30 DIAGNOSIS — Z71.89 CARDIAC RISK COUNSELING: Primary | ICD-10-CM

## 2023-08-30 DIAGNOSIS — R06.09 DOE (DYSPNEA ON EXERTION): ICD-10-CM

## 2023-08-30 PROCEDURE — 1036F TOBACCO NON-USER: CPT | Performed by: INTERNAL MEDICINE

## 2023-08-30 PROCEDURE — 99214 OFFICE O/P EST MOD 30 MIN: CPT | Performed by: INTERNAL MEDICINE

## 2023-08-30 PROCEDURE — G8417 CALC BMI ABV UP PARAM F/U: HCPCS | Performed by: INTERNAL MEDICINE

## 2023-08-30 PROCEDURE — G8427 DOCREV CUR MEDS BY ELIG CLIN: HCPCS | Performed by: INTERNAL MEDICINE

## 2023-08-30 PROCEDURE — 3079F DIAST BP 80-89 MM HG: CPT | Performed by: INTERNAL MEDICINE

## 2023-08-30 PROCEDURE — 1123F ACP DISCUSS/DSCN MKR DOCD: CPT | Performed by: INTERNAL MEDICINE

## 2023-08-30 PROCEDURE — 93000 ELECTROCARDIOGRAM COMPLETE: CPT | Performed by: INTERNAL MEDICINE

## 2023-08-30 PROCEDURE — 3075F SYST BP GE 130 - 139MM HG: CPT | Performed by: INTERNAL MEDICINE

## 2023-08-30 NOTE — PROGRESS NOTES
2023    Gerber Louis MD  [facility]    RE: Humberto Solitario., DPM  :      Dear Dr. 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 He was previously following with Dr. Keyonna Contreras. Currently, reports intermittent palpitations that come and go. Episodes are described as \"racing\" and skipping that last a couple of minutes before gradually resolving. He reports associated shortness of breath but no near syncope or syncope. He denies any chest pain or limiting exertional dyspnea with activity like 30 minutes walking on a treadmill at 2.5 miles per hour 2-3x/week. He does get dizzy with position changes. He denies any prior history of atrial fibrillation. 2023: Patient here for cardiac clearance. He is tentatively scheduled for right knee replacement surgery on 2023. He has been less active due to ongoing right knee pain. He denies any chest pain with his daily activities. He has dyspnea with more physical degrees of activity as well as worsening right knee pain. He was previously having more palpitations but it has significantly improved as of late. Recent event monitor showed runs of PSVT but no A-fib. No near-syncope or syncope. Reports compliance with medications.     Current medications:   Current Outpatient Medications   Medication Sig Dispense Refill    ezetimibe-simvastatin (VYTORIN) 10-20 MG per tablet TAKE 1 TABLET BY MOUTH NIGHTLY 30 tablet 2    allopurinol (ZYLOPRIM) 100 MG tablet TAKE 1 TABLET BY MOUTH DAILY 30 tablet 5    Multiple Vitamins-Minerals (MULTI COMPLETE) CAPS Take by mouth      metoprolol tartrate (LOPRESSOR) 50 MG tablet TAKE 1 TABLET BY MOUTH TWICE DAILY 180 tablet 2    hydroCHLOROthiazide (HYDRODIURIL) 25 MG tablet TAKE 1 TABLET BY MOUTH DAILY 90 tablet 2    finasteride

## 2023-09-08 LAB
ANION GAP IN SER/PLAS: 13 MMOL/L (ref 10–20)
CALCIUM (MG/DL) IN SER/PLAS: 9.7 MG/DL (ref 8.6–10.3)
CARBON DIOXIDE, TOTAL (MMOL/L) IN SER/PLAS: 27 MMOL/L (ref 21–32)
CHLORIDE (MMOL/L) IN SER/PLAS: 102 MMOL/L (ref 98–107)
CREATININE (MG/DL) IN SER/PLAS: 1.27 MG/DL (ref 0.5–1.3)
ERYTHROCYTE DISTRIBUTION WIDTH (RATIO) BY AUTOMATED COUNT: 12.9 % (ref 11.5–14.5)
ERYTHROCYTE MEAN CORPUSCULAR HEMOGLOBIN CONCENTRATION (G/DL) BY AUTOMATED: 33 G/DL (ref 32–36)
ERYTHROCYTE MEAN CORPUSCULAR VOLUME (FL) BY AUTOMATED COUNT: 93 FL (ref 80–100)
ERYTHROCYTES (10*6/UL) IN BLOOD BY AUTOMATED COUNT: 5.03 X10E12/L (ref 4.5–5.9)
GFR MALE: 58 ML/MIN/1.73M2
GLUCOSE (MG/DL) IN SER/PLAS: 116 MG/DL (ref 74–99)
HEMATOCRIT (%) IN BLOOD BY AUTOMATED COUNT: 46.7 % (ref 41–52)
HEMOGLOBIN (G/DL) IN BLOOD: 15.4 G/DL (ref 13.5–17.5)
LEUKOCYTES (10*3/UL) IN BLOOD BY AUTOMATED COUNT: 7.4 X10E9/L (ref 4.4–11.3)
NRBC (PER 100 WBCS) BY AUTOMATED COUNT: 0 /100 WBC (ref 0–0)
PLATELETS (10*3/UL) IN BLOOD AUTOMATED COUNT: 179 X10E9/L (ref 150–450)
POTASSIUM (MMOL/L) IN SER/PLAS: 4.3 MMOL/L (ref 3.5–5.3)
SODIUM (MMOL/L) IN SER/PLAS: 138 MMOL/L (ref 136–145)
UREA NITROGEN (MG/DL) IN SER/PLAS: 21 MG/DL (ref 6–23)

## 2023-09-15 ENCOUNTER — OFFICE VISIT (OUTPATIENT)
Dept: FAMILY MEDICINE CLINIC | Age: 78
End: 2023-09-15

## 2023-09-15 ENCOUNTER — HOSPITAL ENCOUNTER (OUTPATIENT)
Dept: NUCLEAR MEDICINE | Age: 78
Discharge: HOME | End: 2023-09-17
Attending: INTERNAL MEDICINE
Payer: MEDICARE

## 2023-09-15 ENCOUNTER — HOSPITAL ENCOUNTER (OUTPATIENT)
Age: 78
Discharge: HOME | End: 2023-09-17
Attending: INTERNAL MEDICINE
Payer: MEDICARE

## 2023-09-15 VITALS
HEIGHT: 64 IN | WEIGHT: 234 LBS | DIASTOLIC BLOOD PRESSURE: 80 MMHG | SYSTOLIC BLOOD PRESSURE: 120 MMHG | HEART RATE: 55 BPM | TEMPERATURE: 96.7 F | OXYGEN SATURATION: 96 % | BODY MASS INDEX: 39.95 KG/M2

## 2023-09-15 DIAGNOSIS — R06.09 DOE (DYSPNEA ON EXERTION): ICD-10-CM

## 2023-09-15 DIAGNOSIS — N18.30 STAGE 3 CHRONIC KIDNEY DISEASE, UNSPECIFIED WHETHER STAGE 3A OR 3B CKD (HCC): ICD-10-CM

## 2023-09-15 DIAGNOSIS — E79.0 HYPERURICEMIA: ICD-10-CM

## 2023-09-15 DIAGNOSIS — G47.33 OSA (OBSTRUCTIVE SLEEP APNEA): ICD-10-CM

## 2023-09-15 DIAGNOSIS — I10 ESSENTIAL HYPERTENSION: ICD-10-CM

## 2023-09-15 DIAGNOSIS — M17.11 PRIMARY OSTEOARTHRITIS OF RIGHT KNEE: Primary | ICD-10-CM

## 2023-09-15 DIAGNOSIS — Z01.818 PRE-OP EXAM: ICD-10-CM

## 2023-09-15 DIAGNOSIS — Z71.89 CARDIAC RISK COUNSELING: ICD-10-CM

## 2023-09-15 DIAGNOSIS — R73.03 PREDIABETES: ICD-10-CM

## 2023-09-15 LAB
NUC STRESS EJECTION FRACTION: 63 %
STRESS BASELINE DIAS BP: 88 MMHG
STRESS BASELINE HR: 65 BPM
STRESS BASELINE SYS BP: 122 MMHG
STRESS ESTIMATED WORKLOAD: 1 METS
STRESS PEAK DIAS BP: 77 MMHG
STRESS PEAK SYS BP: 177 MMHG
STRESS PERCENT HR ACHIEVED: 50 %
STRESS POST PEAK HR: 71 BPM
STRESS RATE PRESSURE PRODUCT: NORMAL BPM*MMHG
STRESS TARGET HR: 142 BPM
TID: 1.01

## 2023-09-15 PROCEDURE — 93017 CV STRESS TEST TRACING ONLY: CPT

## 2023-09-15 PROCEDURE — 93018 CV STRESS TEST I&R ONLY: CPT | Performed by: INTERNAL MEDICINE

## 2023-09-15 PROCEDURE — A9502 TC99M TETROFOSMIN: HCPCS | Performed by: INTERNAL MEDICINE

## 2023-09-15 PROCEDURE — 2580000003 HC RX 258: Performed by: INTERNAL MEDICINE

## 2023-09-15 PROCEDURE — 3430000000 HC RX DIAGNOSTIC RADIOPHARMACEUTICAL: Performed by: INTERNAL MEDICINE

## 2023-09-15 PROCEDURE — 78452 HT MUSCLE IMAGE SPECT MULT: CPT

## 2023-09-15 PROCEDURE — 6360000002 HC RX W HCPCS: Performed by: INTERNAL MEDICINE

## 2023-09-15 RX ORDER — REGADENOSON 0.08 MG/ML
0.4 INJECTION, SOLUTION INTRAVENOUS
Status: COMPLETED | OUTPATIENT
Start: 2023-09-15 | End: 2023-09-15

## 2023-09-15 RX ORDER — SODIUM CHLORIDE 0.9 % (FLUSH) 0.9 %
10 SYRINGE (ML) INJECTION PRN
Status: COMPLETED | OUTPATIENT
Start: 2023-09-15 | End: 2023-09-15

## 2023-09-15 RX ADMIN — Medication 10 ML: at 09:29

## 2023-09-15 RX ADMIN — Medication 10 ML: at 09:28

## 2023-09-15 RX ADMIN — Medication 10 ML: at 07:55

## 2023-09-15 RX ADMIN — TETROFOSMIN 11.9 MILLICURIE: 1.38 INJECTION, POWDER, LYOPHILIZED, FOR SOLUTION INTRAVENOUS at 07:55

## 2023-09-15 RX ADMIN — TETROFOSMIN 35.1 MILLICURIE: 1.38 INJECTION, POWDER, LYOPHILIZED, FOR SOLUTION INTRAVENOUS at 09:28

## 2023-09-15 RX ADMIN — REGADENOSON 0.4 MG: 0.08 INJECTION, SOLUTION INTRAVENOUS at 09:28

## 2023-09-18 ENCOUNTER — TELEPHONE (OUTPATIENT)
Dept: CARDIOLOGY CLINIC | Age: 78
End: 2023-09-18

## 2023-09-18 NOTE — TELEPHONE ENCOUNTER
----- Message from Crystal Hannah DO sent at 9/18/2023 12:20 PM EDT -----  Regarding: Please send cardiac clearance to patient's orthopedic surgeon  Hi    Please send cardiac clearance to patient's orthopedic surgeon clearing him for surgery. His stress test was negative. He is cleared with low cardiac risk.      Thanks  LM

## 2023-09-18 NOTE — TELEPHONE ENCOUNTER
Jimmy Barfield from orthopedics requesting for clearance form and results to be faxed to them. Fax # [phone]   Attention: Rian Ward waiting for fax confirmation.

## 2023-09-19 ENCOUNTER — HOSPITAL ENCOUNTER (OUTPATIENT)
Dept: DATA CONVERSION | Facility: HOSPITAL | Age: 78
End: 2023-09-20
Attending: ORTHOPAEDIC SURGERY | Admitting: ORTHOPAEDIC SURGERY

## 2023-09-19 DIAGNOSIS — M25.761 OSTEOPHYTE, RIGHT KNEE: ICD-10-CM

## 2023-09-19 DIAGNOSIS — M17.11 UNILATERAL PRIMARY OSTEOARTHRITIS, RIGHT KNEE: ICD-10-CM

## 2023-09-19 LAB
ERYTHROCYTE DISTRIBUTION WIDTH (RATIO) BY AUTOMATED COUNT: 12.8 % (ref 11.5–14.5)
ERYTHROCYTE MEAN CORPUSCULAR HEMOGLOBIN CONCENTRATION (G/DL) BY AUTOMATED: 33.3 G/DL (ref 32–36)
ERYTHROCYTE MEAN CORPUSCULAR VOLUME (FL) BY AUTOMATED COUNT: 93 FL (ref 80–100)
ERYTHROCYTES (10*6/UL) IN BLOOD BY AUTOMATED COUNT: 4.55 X10E12/L (ref 4.5–5.9)
HEMATOCRIT (%) IN BLOOD BY AUTOMATED COUNT: 42.4 % (ref 41–52)
HEMOGLOBIN (G/DL) IN BLOOD: 14.1 G/DL (ref 13.5–17.5)
LEUKOCYTES (10*3/UL) IN BLOOD BY AUTOMATED COUNT: 12.8 X10E9/L (ref 4.4–11.3)
NRBC (PER 100 WBCS) BY AUTOMATED COUNT: 0 /100 WBC (ref 0–0)
PLATELETS (10*3/UL) IN BLOOD AUTOMATED COUNT: 142 X10E9/L (ref 150–450)

## 2023-09-20 LAB
ANION GAP IN SER/PLAS: 13 MMOL/L (ref 10–20)
CALCIUM (MG/DL) IN SER/PLAS: 8.8 MG/DL (ref 8.6–10.3)
CARBON DIOXIDE, TOTAL (MMOL/L) IN SER/PLAS: 26 MMOL/L (ref 21–32)
CHLORIDE (MMOL/L) IN SER/PLAS: 100 MMOL/L (ref 98–107)
CREATININE (MG/DL) IN SER/PLAS: 1.36 MG/DL (ref 0.5–1.3)
ERYTHROCYTE DISTRIBUTION WIDTH (RATIO) BY AUTOMATED COUNT: 12.8 % (ref 11.5–14.5)
ERYTHROCYTE MEAN CORPUSCULAR HEMOGLOBIN CONCENTRATION (G/DL) BY AUTOMATED: 33.6 G/DL (ref 32–36)
ERYTHROCYTE MEAN CORPUSCULAR VOLUME (FL) BY AUTOMATED COUNT: 94 FL (ref 80–100)
ERYTHROCYTES (10*6/UL) IN BLOOD BY AUTOMATED COUNT: 4.32 X10E12/L (ref 4.5–5.9)
GFR MALE: 53 ML/MIN/1.73M2
GLUCOSE (MG/DL) IN SER/PLAS: 146 MG/DL (ref 74–99)
HEMATOCRIT (%) IN BLOOD BY AUTOMATED COUNT: 40.5 % (ref 41–52)
HEMOGLOBIN (G/DL) IN BLOOD: 13.6 G/DL (ref 13.5–17.5)
LEUKOCYTES (10*3/UL) IN BLOOD BY AUTOMATED COUNT: 11 X10E9/L (ref 4.4–11.3)
NRBC (PER 100 WBCS) BY AUTOMATED COUNT: 0 /100 WBC (ref 0–0)
PLATELETS (10*3/UL) IN BLOOD AUTOMATED COUNT: 156 X10E9/L (ref 150–450)
POTASSIUM (MMOL/L) IN SER/PLAS: 3.8 MMOL/L (ref 3.5–5.3)
SODIUM (MMOL/L) IN SER/PLAS: 135 MMOL/L (ref 136–145)
UREA NITROGEN (MG/DL) IN SER/PLAS: 24 MG/DL (ref 6–23)

## 2023-09-21 LAB
ANION GAP IN SER/PLAS: NORMAL
CALCIUM (MG/DL) IN SER/PLAS: NORMAL
CARBON DIOXIDE, TOTAL (MMOL/L) IN SER/PLAS: NORMAL
CHLORIDE (MMOL/L) IN SER/PLAS: NORMAL
CREATININE (MG/DL) IN SER/PLAS: NORMAL
GFR FEMALE: NORMAL
GFR MALE: NORMAL
GLUCOSE (MG/DL) IN SER/PLAS: NORMAL
POTASSIUM (MMOL/L) IN SER/PLAS: NORMAL
SODIUM (MMOL/L) IN SER/PLAS: NORMAL
UREA NITROGEN (MG/DL) IN SER/PLAS: NORMAL

## 2023-09-29 VITALS — BODY MASS INDEX: 39.6 KG/M2 | HEIGHT: 64 IN | WEIGHT: 231.92 LBS

## 2023-09-29 RX ORDER — ALLOPURINOL 100 MG/1
TABLET ORAL
Qty: 30 TABLET | Refills: 5 | Status: SHIPPED | OUTPATIENT
Start: 2023-09-29

## 2023-09-29 NOTE — TELEPHONE ENCOUNTER
Comments:     Last Office Visit (last PCP visit):   9/15/2023    Next Visit Date:  Future Appointments   Date Time Provider [address]   2/28/2024  1:00 PM Noris Lord, [address] [place] [address]   3/15/2024 10:00 AM Angelica Bourgeois MD [facility] [place]       **If hasn't been seen in over a year OR hasn't followed up according to last diabetes/ADHD visit, make appointment for patient before sending refill to provider.     Rx requested:  Requested Prescriptions     Pending Prescriptions Disp Refills    allopurinol (ZYLOPRIM) 100 MG tablet [Pharmacy Med Name: allopurinol 100 mg tablet] 30 tablet 5     Sig: TAKE 1 TABLET BY MOUTH DAILY

## 2023-09-30 NOTE — DISCHARGE SUMMARY
Send Summary:   Discharge Summary Providers:  Provider Role Provider Name   · Attending Rebekah Elias   · Referring Rebekah Elias   · Primary Unknown, Pcp       Note Recipients: Rebekah Elias MD  Unknown, Pcp, MD       Discharge:    Summary:   Admission Date: .19-Sep-2023 05:21:00   Discharge Date: 20-Sep-2023   Attending Physician at Discharge: Rebekah Elias   Admission Reason: right knee osteoarthritis   Final Discharge Diagnoses: Osteoarthritis of right  knee   Procedures: Date: 19-Sep-2023 10:25:00  Procedure Name:  RIGHT TOTAL KNEE REPLACEMENT   Condition at Discharge: Satisfactory   Disposition at Discharge: Home Health Care - New   Vital Signs:        T   P  R  BP   MAP  SpO2   Value  36.3  69  18  119/67      93%  Date/Time 9/20 8:03 9/20 8:03 9/20 8:03 9/20 8:03    9/20 8:03  Range  (35.8C - 36.3C )  (53 - 69 )  (18 - 18 )  (119 - 152 )/ (62 - 75 )    (92% - 97% )   As of 20-Sep-2023 00:00:00, patient is on 2 L/min of oxygen via nasal cannula.    Date:            Weight/Scale Type:  Height:   19-Sep-2023 12:39  105.2  kg / bed  162.5  cm  Physical Exam:    Constitutional: Well developed, awake and  alert, no apparent distress, cooperative.   Eyes: EOMI, clear sclera.   ENMT: Mucous membranes moist.   Respiratory/Thorax: Patent airways, good  chest expansion. No apparent respiratory distress.   Cardiovascular: Regular rate and rhythm.   Musculoskeletal: Bulky surgical dressing  and ACE wrap to right lower extremity. No visible spotting or drainage to the dressing. Movement of the right toes appreciated, and right ankle plantarflexion and dorsiflexion against resistance intact. Sensation to the right lower extremity present.  Right dorsal pedal pulse present.   Neurological: Alert, no clear focal deficit.   Psychological: Appropriate mood and behavior.   Skin: Warm and dry.     Hospital Course:    Hospital day #2, postoperative day #1 of right total knee arthroplasty for   osteoarthritis rigth knee.  Patient had satisfactory postop course with no major complications.   He fully participated in physical and Occupational Therapy.  Used his incentive spirometry as directed.  Tolerated diet with no nausea vomiting, passed flatus, and urinated well.  Discharge to home with home health care in satisfactory condition.      Discharge Information:    and Continuing Care:   Lab Results - Pending:    None  Radiology Results - Pending: None   Discharge Instructions:    Activity:           activity as tolerated.          May shower..            May not return to school/work until follow-up visit with.            May not drive until follow-up visit.            No pushing, pulling, or lifting objects greater than 10 pounds until follow-up visit.            Weight-bearing Instructions: weight-bearing as tolerated right leg.      Nutrition/Diet:           resume normal diet    Wound Care:           Wound Type:   surgical incision          Cleanse With:   soap and water          Instructions:   no lotions, creams, or tub soaks          Other Instructions:   Remove surgical dressing post op day #7 and leave open to air.    Additional Orders:           Additional Instructions:   Call Dr. Elias for any problems and/or concerns.  *weight bearing as tolerated  *Maintain knee precautions  *No pillow under affected knee  *Raise (elevate) your leg above the level of your heart while you are sitting or lying down (place pillow underneath calf)  *You may shower, do not soak or scrub incision; pat dry  *If you have a polar care cooling device, use as instructed; otherwise  Apply ice to affected knee as needed to minimize swelling, on 20 minutes, off 20 minutes  *Move your toes often to avoid stiffness and to lessen swelling  *Avoid sitting for a long time without moving. Get up to take short walks every 1-2 hours. This is important to improve blood flow and breathing. Ask for help if you feel weak or  unsteady  *Continue the coughing and deep breathing exercises that you learned in the hospital    Call Doctor right away for:  *Fever of 100.4 or above, shaking chills  *Stiffness, or inability to move the knee  *Increased swelling in your leg  *Increased redness, tenderness, or swelling in or around the knee incision  *Drainage from the knee incision  *Increased knee pain  *An incision that breaks open  *Chest pain/shortness of breath-call 911    After the procedure you can expect pain, swelling, and limited range of motion    Narcotic pain medication may cause constipation. You may need to take actions to prevent or treat constipation, such as:  -drink enough fluid to keep your urine pale yellow  -take over-the-counter or prescription medicines  -eat foods that are high in fiber, such as beans, whole grains, and fresh fruits and vegetables  -limit foods that are high in fat and processed sugars, such as fried or sweet foods    Take your blood thinner (anticoagulant) as prescribed by your physician to prevent blood clots.   If your physician prescribed KELBY Hose (compression stockings)-wear as instructed as they will help reduce swelling and the formation of blood clots    Do not use any products that contain nicotine or tobacco, such as cigarettes, e-cigarettes, and chewing tobacco. These can delay healing after surgery. If you need help quitting, ask your health care provider    If you were given a knee immobilizer, please take it home with you and keep it handy for at least 4 weeks just in case any issues arise (i.e. knee buckling).    Home Care Certification:           Home Care Agency:   Other (with phone number)          Skilled Disciplines Ordered:   PT,  OT    Home Care Services:           Home Care Skilled Service:   Rehab (PT/OT/SP eval and treat)    Follow Up Appointments:    Follow-Up Appointment 01:           Physician/Dept/Service:   Dr. Elias          Phone Number:   [phone]    Discharge  Medications: Home Medication   Metoprolol Tartrate 50 mg oral tablet - 1 tab(s) orally 2 times a day  terazosin 10 mg oral capsule - 1 cap(s) orally once a day (at bedtime)  ezetimibe-simvastatin 10 mg-20 mg oral tablet - 1 tab(s) orally once a day (at bedtime)  hydroCHLOROthiazide 25 mg oral tablet - 1 tab(s) orally once a day  allopurinol 100 mg oral tablet - 1 tab(s) orally once a day  docusate sodium 100 mg oral capsule - 1 cap(s) orally 2 times a day  Multiple Vitamins oral capsule - 1 cap(s) orally once a day  omeprazole 10 mg oral delayed release capsule - 1 cap(s) orally 2 times a week  finasteride 5 mg oral tablet - 1 tab(s) orally once a day  aspirin 325 mg oral delayed release tablet - 1 tab(s) orally 2 times a day for 14 days to help reduce your risk for blood clots     PRN Medication   oxyCODONE 5 mg oral tablet - 1 tab(s) orally every 6 hours, As Needed -Pain - Mod to severe     DNR Status:   ·  Code Status Code Status order at time of discharge: Full Code       Electronic Signatures:  Simran Torres (PAC)  (Signed 20-Sep-2023 12:48)   Authored: Send Summary, Summary Content, Ongoing Care,  DNR Status, Note Completion      Last Updated: 20-Sep-2023 12:48 by Simran Torres (PAC)

## 2023-09-30 NOTE — PROGRESS NOTES
Service: Orthopaedics     Subjective Data:   RADHA SHARPE is a 78 year old Male who is Hospital Day # 1 and POD #0 for RIGHT TOTAL KNEE REPLACEMENT ;     Mr. Sharpe is resting in bed. He is alert and cooperative. He endorses minimal discomfort to his right knee, and he notes gradually resolving numbness to the lower extremities. He is on nasal cannula.  He tolerated a liquid diet this afternoon without nausea or vomiting. He has not yet voided. At this time, he denies chest pain, shortness of breath, abdominal pain, and chills.    Objective Data:     Objective Information:      T   P  R  BP   MAP  SpO2   Value  35.8  60  18  150/67      96%  Date/Time 9/19 12:10 9/19 12:10 9/19 12:10 9/19 12:10    9/19 12:10  Range  (35.8C - 35.8C )  (60 - 60 )  (18 - 18 )  (150 - 150 )/ (67 - 67 )    (96% - 96% )      Pain reported at 9/19 12:15: 3 = Mild    ---- Intake and Output  -----  Mn/Dy/Year Time  Intake   Output  Net  Sep 19, 2023 2:00 pm  1100   0  1100      Physical Exam by System:    Constitutional: Well developed, awake and alert,  mild discomfort due to numbness in right lower extremity, cooperative.   Eyes: EOMI, clear sclera.   Respiratory/Thorax: Patent airways, good chest expansion.  No apparent respiratory distress.   Cardiovascular: Regular rate and rhythm.   Gastrointestinal: Nondistended, soft, non-tender.   Musculoskeletal: Bulky surgical dressing and ACE  wrap to the right lower extremity. No evidence of drainage or spotting to dressing. Movement of right toes appreciated. Right ankle dorsiflexion and plantarflexion against resistance intact. Sensation to the right lower extremity present, with mild numbness.  Right dorsal pedal pulse palpable.   Neurological: Alert, no clear focal deficit.   Psychological: Appropriate mood and behavior.   Skin: Warm and dry.     Medication:    Medications:          Continuous Medications       --------------------------------    1. Lactated Ringers Infusion:  1000  mL   IntraVenous  <Continuous>         Scheduled Medications       --------------------------------    1. Acetaminophen:  975  mg  Oral  Every 8 Hours    2. Allopurinol:  100  mg  Oral  Every 24 Hours    3. Aspirin Enteric Coated:  325  mg  Oral  2 Times a Day    4. ceFAZolin 2 gram/ D5W 100 mL Premix IVPB:  100  mL  IntraVenous Piggyback  Once    5. ceFAZolin 2 gram/ D5W 100 mL Premix IVPB:  100  mL  IntraVenous Piggyback  Every 8 Hours    6. Docusate:  100  mg  Oral  2 Times a Day    7. Ezetimibe:  10  mg  Oral  At Bedtime    8. Finasteride:  5  mg  Oral  Daily    9. hydroCHLOROthiazide:  25  mg  Oral  Daily    10. Metoprolol Tartrate:  50  mg  Oral  2 Times a Day    11. Pantoprazole:  40  mg  Oral  Daily    12. Polyethylene Glycol:  17  gram(s)  Oral  2 Times a Day    13. Simvastatin:  20  mg  Oral  At Bedtime    14. Terazosin:  10  mg  Oral  At Bedtime         PRN Medications       --------------------------------    1. Ondansetron Injectable:  4  mg  IntraVenous Push  Every 6 Hours    2. oxyCODONE Immediate Release:  5  mg  Oral  Every 4 Hours    3. oxyCODONE Immediate Release:  10  mg  Oral  Every 4 Hours        Assessment and Plan:        Additional Dx:   Osteoarthritis of right knee: Entered Date: 19-Sep-2023 14:00       Surg History:   History of eyelid surgery: Entered Date: 08-Sep-2023 10:53   Cataracts, bilateral: Entered Date: 08-Sep-2023 10:52   History of sinus surgery: Entered Date: 08-Sep-2023 10:52    Comorbidities:  ·  Comorbidity obesity   ·  Obesity obesity (BMI 35-39.9)   ·  BMI 39.84     Code Status:  ·  Code Status Full Code       Impression 1: Primary Localized Osteoarthritis of  Right Knee   Plan for Impression 1: Post-op day #0 status post  right total knee replacement.  -Weightbearing of right lower extremity as tolerated  -Progress diet as tolerated  -Multimodal pain regimen ordered: acetaminophen, oxycodone tablets  -Bowel regimen ordered: Miralax, Colace  -Encouraged continued use of  incentive spirometry  -PT/OT consulted  -Labs ordered (BMP, CBC)  -VTE prophylaxis: SCDs, aspirin 325 BID  -Disposition: likely discharge home when appropriate  -Follow up with Dr. Elias as directed     Attestation:   Note Completion:  I am a:  PA Student   PA Student Attestation I was present with the PA student who participated in the documentation of this note.  I have personally seen and re-examined the patient and performed the  medical decision-making components (assessment and plan of care). I have reviewed the PA student documentation and verified the findings in the note as written with additions or exceptions as stated in the body of this note.    I personally evaluated the patient on 19-Sep-2023         Electronic Signatures:  Simran Torres (PAC)  (Signed 19-Sep-2023 14:52)   Authored: Note Completion   Co-Signer: Service, Subjective Data, Objective Data, Assessment and Plan, Note Completion  Veronica Mays (TARSHA)  (Signed 19-Sep-2023 14:49)   Authored: Service, Subjective Data, Objective Data, Assessment  and Plan, Note Completion      Last Updated: 19-Sep-2023 14:52 by Simran Torres (PAC)

## 2023-09-30 NOTE — PROGRESS NOTES
Service: Orthopaedics     Subjective Data:   RADHA SHARPE is a 78 year old Male who is Hospital Day # 2 and POD #1 for RIGHT TOTAL KNEE REPLACEMENT ;     Mr. Sharpe is sitting up in bed this morning, waiting for breakfast. He is cooperative and pleasant. He notes he tolerates oral intake without nausea and vomiting. He was retaining urine and voided via  straight cath late last night. He has voided without issue since. He states he has worked with therapy and walked the halls without concern. He notes numbness to his lower extremities has resolved. He is on room air. He denies shortness of breath, chest  pain, abdominal pain, dizziness, and chills at this time.    Objective Data:     Objective Information:      T   P  R  BP   MAP  SpO2   Value  36.3  69  18  119/67      93%  Date/Time 9/20 8:03 9/20 8:03 9/20 8:03 9/20 8:03    9/20 8:03  Range  (35.8C - 36.3C )  (53 - 69 )  (18 - 18 )  (119 - 152 )/ (62 - 75 )    (92% - 97% )   As of 20-Sep-2023 00:00:00, patient is on 2 L/min of oxygen via nasal cannula.      Pain reported at 9/19 20:00: 2 = Mild    ---- Intake and Output  -----  Mn/Dy/Year Time  Intake   Output  Net  Sep 20, 2023 6:00 am  1180   2450  -1270  Sep 19, 2023 10:00 pm  920   0  920  Sep 19, 2023 2:00 pm  1100   0  1100    The Intake and Output Totals for the last 24 hours are:      Intake   Output  Net      3200   2450  750    Physical Exam by System:    Constitutional: Well developed, awake and alert,  no apparent distress, cooperative.   Eyes: EOMI, clear sclera.   ENMT: Mucous membranes moist.   Respiratory/Thorax: Patent airways, good chest expansion.  No apparent respiratory distress.   Cardiovascular: Regular rate and rhythm.   Musculoskeletal: Bulky surgical dressing and ACE  wrap to right lower extremity. No visible spotting or drainage to the dressing. Movement of the right toes appreciated, and right ankle plantarflexion and dorsiflexion against resistance intact. Sensation to the right  lower extremity present. Right dorsal  pedal pulse present.   Neurological: Alert, no clear focal deficit.   Psychological: Appropriate mood and behavior.   Skin: Warm and dry.     Medication:    Medications:          Continuous Medications       --------------------------------    1. Lactated Ringers Infusion:  1000  mL  IntraVenous  <Continuous>         Scheduled Medications       --------------------------------    1. Acetaminophen:  975  mg  Oral  Every 8 Hours    2. Allopurinol:  100  mg  Oral  Every 24 Hours    3. Aspirin Enteric Coated:  325  mg  Oral  2 Times a Day    4. ceFAZolin 2 gram/ D5W 100 mL Premix IVPB:  100  mL  IntraVenous Piggyback  Once    5. Celecoxib (CELEBREX):  100  mg  Oral  2 Times a Day    6. Docusate:  100  mg  Oral  2 Times a Day    7. Ezetimibe:  10  mg  Oral  At Bedtime    8. Finasteride:  5  mg  Oral  Daily    9. hydroCHLOROthiazide:  25  mg  Oral  Daily    10. Influenza Virus (Inactive) HIGH DOSE Adult Vaccine:  0.7  mL  IntraMuscular  Once    11. Metoprolol Tartrate:  50  mg  Oral  2 Times a Day    12. Pantoprazole:  40  mg  Oral  Daily    13. Polyethylene Glycol:  17  gram(s)  Oral  2 Times a Day    14. Simvastatin:  20  mg  Oral  At Bedtime    15. Terazosin:  10  mg  Oral  At Bedtime         PRN Medications       --------------------------------    1. Ondansetron Injectable:  4  mg  IntraVenous Push  Every 6 Hours    2. oxyCODONE Immediate Release:  5  mg  Oral  Every 4 Hours    3. oxyCODONE Immediate Release:  10  mg  Oral  Every 4 Hours        Recent Lab Results:    Results:    CBC: 9/20/2023 05:41              \     Hgb     /                              \     13.6       /  WBC  ----------------  Plt               11.0       ----------------    156              /     Hct     \                              /     40.5 L    \            RBC: 4.32 L    MCV: 94           BMP: 9/20/2023 05:41  NA+        Cl-     BUN  /                         135 L   100    24 H   /  --------------------------------  Glucose                ---------------------------  146 H    K+     HCO3-   Creat \                         3.8  26    1.36 H \  Calcium : 8.8     Anion Gap : 13      Assessment and Plan:        Additional Dx:   Body mass index [BMI] 39.0-39.9, adult: Entered Date: 19-Sep-2023  14:49   Osteoarthritis of right knee: Entered Date: 19-Sep-2023 14:00       Surg History:   History of eyelid surgery: Entered Date: 08-Sep-2023 10:53   Cataracts, bilateral: Entered Date: 08-Sep-2023 10:52   History of sinus surgery: Entered Date: 08-Sep-2023 10:52    Code Status:  ·  Code Status Full Code       Impression 1: Primary Localized Osteoarthritis of  Right Knee   Plan for Impression 1: Post-op day #1 status post  right total knee replacement.  -Weightbearing of right lower extremity as tolerated  -Encouraged continued use of incentive spirometer  -PT/OT consulted  -Multimodal pain regimen: acetaminophen, oxycodone tablets  -Bowel Regimen: Colace, Miralax  -Labs reviewed; Hgb 13.6 from 14.1 pre-operatively.  BUN 24, Cr 1.36 from BUN 21 and Cr 1.27 on 9/8  -VTE prophylaxis: aspirin 325 BID, SCDs  -Disposition: plans to discharge home when appropriate  -Follow up with Dr. Elias as directed.     Attestation:   Note Completion:  I am a:  PA Student   PA Student Attestation I was present with the PA student who participated in the documentation of this note.  I have personally seen and re-examined the patient and performed the  medical decision-making components (assessment and plan of care). I have reviewed the PA student documentation and verified the findings in the note as written with additions or exceptions as stated in the body of this note.    I personally evaluated the patient on 20-Sep-2023         Electronic Signatures:  Simran Torres (PAC)  (Signed 20-Sep-2023 10:14)   Authored: Note Completion   Co-Signer: Service, Subjective Data, Objective Data, Assessment and Plan, Note  Completion  Veronica Mays (TARSHA)  (Signed 20-Sep-2023 09:53)   Authored: Service, Subjective Data, Objective Data, Assessment  and Plan, Note Completion      Last Updated: 20-Sep-2023 10:14 by Simran Torres (PAC)

## 2023-10-01 NOTE — OP NOTE
PROCEDURE DETAILS    Preoperative Diagnosis:  Primary localized osteoarthritis of right knee, M17.11    Postoperative Diagnosis:  Primary localized osteoarthritis of right knee, M17.11    Surgeon: Rebekah Elias  Resident/Fellow/Other Assistant: Fernando Hall    Procedure:   RIGHT TOTAL KNEE REPLACEMENT     Anesthesia: Spinal anesthetic  Estimated Blood Loss: 0  Findings: Arthritis right knee  Specimens(s) Collected: no,     Complications: None  IV Fluids: 700 cc  Implants: [place] press-fit size 5 femur, size 4 tibia, 9 poly, 35 patella  Tourniquet Times: Utilized  Patient Returned To/Condition: Stable        Operative Report:   Brief clinical note:    Patient presents status post failed treatment osteoarthritis of her knee.  The patient failed conservative measures.  These include injections activity modification therapy anti-inflammatories among others.  The patient presents today for operative fixation  with a total knee arthroplasty on the right.  The risks and benefits of surgery were inherent to the procedure were discussed in detail.  That includes osteolysis loosening or malalignment infection.  Other risks medical and anesthetic including DVT PE  MI and stroke were also discussed.  Patient understanding these risks and the importance of rehab.  Patient wished to proceed.  The patient is consented and marked.  Patient did receive TXA orally preoperatively    Operative note:    Patient presents for a right total knee arthroplasty today.  We will be doing the right side the patient presents to the operating room.  He underwent a spinal anesthetic.  The patient's position and prepped and draped in usual manner.  A final timeout  is done with the operative team.  Esmarch exsanguination is done the tourniquet is inflated.  Of note the patient did receive antibiotic prior to the tourniquet inflation.    Longitudinal incision was carried out over the right knee.  A medial parapatellar approach is done is  the flaps are elevated.  The fluids exsanguinated from the region and the patella is reflected the knee is flexed.  Osteophytes removed off the patella  femur and the tibia which was quite extensive.  Attention was first toward the femur.  Intramedullary guide is then applied with appropriate rotation and settings.  This is then placed intramedullary and cuts are done taking minimal cut off the distal  femur distally.  The femur was then sized and a size 5 was selected.  This would allow good fit of the femur medially and laterally without notching.  The 5 way cutting guide was then applied an anterior posterior cuts and chamfer cuts were made.   Cuts  were all completed and a size 5 trial was placed which gave good fit.  Attention was turned to the tibia.     The meniscus's were all removed.  Osteophytes are removed medially and laterally.  The ACL was removed and the two-pronged guide was applied.  This exposed the tibia nicely.  Using the extra medullary guide the p.o. guide was set to take minimal bone  off the medial side which the low side.  This placed the appropriate varus and valgus position and the cut was made.  This was checked with the block and in good position.  For the debris was moved medially laterally.  The tibia was then sized with the  osteophytes removed the skin down to a size 4 and the size 4 tibia was applied followed by trials for the poly.  A size 9 poly was selected which was able give good stability both in flexion extension with good flexion and good extension.  The patient's  bone quality was a satisfactory and therefore the drills were used over the trial for the femur and the tibia was then finished for a press-fit size 3 tibia.  This was done with the block keel and subsequent 4-prong block.  Femur and tibia are now prepped  completely prepared for the final implants.  Prior to doing such the patella was prepared.    Patella patella was measured in its depth.  This utilizing a  sizing guide.  A freehand cut was then made in the patella to appropriate depth.  The sizing guide elected a size 35 patella.  Therefore the areas prepared with the 3 holes for the press-fit  metal-backed patella from the [place] system.  A trial done with the patella then tracked very nicely without the need for lateral release.  No Irrisept was then utilized to washout the area.    Areas washed out with Irrisept lavage and the press-fit components then applied in sequence.  This includes the tibia followed by the size 9 poly-,  followed by the femur and finally the patella.  All final components tracked very nicely as the trials.  Areas washed out.  Using the [place] solution, 50 cc was utilized for postoperative pain management.  The parapatellar approach was then repaired in  combination with Ethibond followed by strata fix at 30 degrees.  Skin was then reapproximated multiple layers with a final strata fix and Methyl-Plex layer.  Sterile dressings were applied.  Patient tolerated procedure well without complication.  Disposition  with recovery room.  The patient will receive 2 additional dose of antibiotic.  The patient will also be placed on aspirin 325 mg twice daily for postoperative DVT prophylaxis.   A Tourniquet was put up at the beginning the case was let down prior to  the dressings.  Patient did receive preoperative TXA orally.                        Attestation:   Note Completion:  Attending Attestation I performed the procedure without a resident         Electronic Signatures:  Rebekah Elias)  (Signed 19-Sep-2023 10:43)   Authored: Post-Operative Note, Chart Review, Note Completion      Last Updated: 19-Sep-2023 10:43 by Rebekah Elias)

## 2023-10-27 ENCOUNTER — NURSE ONLY (OUTPATIENT)
Dept: FAMILY MEDICINE CLINIC | Age: 78
End: 2023-10-27

## 2023-10-27 DIAGNOSIS — Z23 FLU VACCINE NEED: Primary | ICD-10-CM

## 2023-10-27 NOTE — PROGRESS NOTES
After obtaining consent, and per orders of Dr. Lynn Carreno, injection of high dose flu  given in right deltoid by Nelly Mishra MA. Patient instructed to remain in clinic for 20 minutes afterwards, and to report any adverse reaction to me immediately. Vaccine Information Sheet, \"Influenza - Inactivated\"  given to Mary Youngblood., or parent/legal guardian of  Mary Youngblood. and verbalized understanding. Patient responses:    Have you ever had a reaction to a flu vaccine? No  Are you able to eat eggs without adverse effects? Yes  Do you have any current illness? No  Have you ever had Guillian [place] Syndrome? No    Flu vaccine given per order. Please see immunization tab.         \

## 2023-11-02 ENCOUNTER — OFFICE VISIT (OUTPATIENT)
Dept: FAMILY MEDICINE CLINIC | Age: 78
End: 2023-11-02
Payer: MEDICARE

## 2023-11-02 VITALS
RESPIRATION RATE: 18 BRPM | WEIGHT: 230 LBS | DIASTOLIC BLOOD PRESSURE: 76 MMHG | HEART RATE: 67 BPM | BODY MASS INDEX: 39.27 KG/M2 | SYSTOLIC BLOOD PRESSURE: 136 MMHG | HEIGHT: 64 IN | OXYGEN SATURATION: 97 % | TEMPERATURE: 96.9 F

## 2023-11-02 DIAGNOSIS — L02.212 BACK ABSCESS: Primary | ICD-10-CM

## 2023-11-02 DIAGNOSIS — L02.212 BACK ABSCESS: ICD-10-CM

## 2023-11-02 PROCEDURE — G8484 FLU IMMUNIZE NO ADMIN: HCPCS | Performed by: NURSE PRACTITIONER

## 2023-11-02 PROCEDURE — G8427 DOCREV CUR MEDS BY ELIG CLIN: HCPCS | Performed by: NURSE PRACTITIONER

## 2023-11-02 PROCEDURE — 3078F DIAST BP <80 MM HG: CPT | Performed by: NURSE PRACTITIONER

## 2023-11-02 PROCEDURE — G8417 CALC BMI ABV UP PARAM F/U: HCPCS | Performed by: NURSE PRACTITIONER

## 2023-11-02 PROCEDURE — 1036F TOBACCO NON-USER: CPT | Performed by: NURSE PRACTITIONER

## 2023-11-02 PROCEDURE — 99213 OFFICE O/P EST LOW 20 MIN: CPT | Performed by: NURSE PRACTITIONER

## 2023-11-02 PROCEDURE — 3075F SYST BP GE 130 - 139MM HG: CPT | Performed by: NURSE PRACTITIONER

## 2023-11-02 PROCEDURE — 1123F ACP DISCUSS/DSCN MKR DOCD: CPT | Performed by: NURSE PRACTITIONER

## 2023-11-02 RX ORDER — DOXYCYCLINE HYCLATE 100 MG
100 TABLET ORAL 2 TIMES DAILY
Qty: 20 TABLET | Refills: 0 | Status: SHIPPED | OUTPATIENT
Start: 2023-11-02 | End: 2023-11-12

## 2023-11-02 ASSESSMENT — ENCOUNTER SYMPTOMS
COLOR CHANGE: 1
NAUSEA: 0

## 2023-11-02 NOTE — PROGRESS NOTES
Subjective  Phylliss Labella., 66 y.o. male presents today with:  Chief Complaint   Patient presents with    Skin Problem     Pt has a bump on the right shoulder        HPI  Presents to [facility] for a skin abscess   Affected area: upper back, right side   Noted area in last week   Recent knee surgery & had been laying more on right side   Site not draining at home as of yet   Increasing in size   Erythema of site   Denies fever or chills   Eating and drinking                 Past Medical History:   Diagnosis Date    Basal cell cancer     multiple,  follows with Dr. Nida Panchal    BPH (benign prostatic hypertrophy)     Cataract     ophthalmologist - Dr. Dread Oveido [facility]    Chest pain     Chronic kidney disease     ARRIAGA (dyspnea on exertion)     GERD (gastroesophageal reflux disease)     Hyperlipidemia     Hypertension     Hyperuricemia 09/05/2018    Kidney stones     HX of    Morbidly obese ([address] [facility]) 03/05/2020    Normal cardiac stress test 01/2015    JOSE (obstructive sleep apnea)     Osteoarthritis     Palpitation     PSVT (paroxysmal supraventricular tachycardia)     Rectus diastasis 09/10/2014      Past Surgical History:   Procedure Laterality Date    COLONOSCOPY  2005    COLONOSCOPY  9/20/13    DR. Newsome [place] [address] SURGERY  2012    cataracts    KNEE SURGERY      Left TKR    NOSE SURGERY      TONSILLECTOMY AND ADENOIDECTOMY       Family History   Family history unknown: Yes           Review of Systems   Constitutional:  Negative for activity change, appetite change, chills, fatigue and fever. Gastrointestinal:  Negative for nausea. Musculoskeletal:  Negative for neck stiffness. Skin:  Positive for color change and wound. Neurological:  Negative for dizziness, light-headedness and headaches. PMH, Surgical Hx, Family Hx, and Social Hx reviewed and updated. Health Maintenance reviewed.         Objective  Vitals:    11/02/23 1229   BP: 136/76   Site: Left Upper Arm   Position: Sitting   Cuff Size:

## 2023-11-07 LAB — CULTURE WOUND: NORMAL

## 2023-11-28 NOTE — TELEPHONE ENCOUNTER
Patient is requesting medication refill.  Please approve or deny this request.    Rx requested:  Requested Prescriptions     Pending Prescriptions Disp Refills    ezetimibe-simvastatin (VYTORIN) 10-20 MG per tablet [Pharmacy Med Name: ezetimibe 10 mg-simvastatin 20 mg tablet] 30 tablet 2     Sig: TAKE 1 TABLET BY MOUTH NIGHTLY         Last Office Visit:   9/15/2023      Next Visit Date:  Future Appointments   Date Time Provider [address] [place] [address]   2/28/2024  1:00  [address] [facility]   3/15/2024 10:00 AM Steph Cancer, MD [facility] Melissa Vidal

## 2023-11-29 RX ORDER — HYDROCHLOROTHIAZIDE 25 MG/1
TABLET ORAL
Qty: 90 TABLET | Refills: 2 | Status: SHIPPED | OUTPATIENT
Start: 2023-11-29

## 2023-11-29 RX ORDER — EZETIMIBE AND SIMVASTATIN 10; 20 MG/1; MG/1
TABLET ORAL
Qty: 30 TABLET | Refills: 2 | Status: SHIPPED | OUTPATIENT
Start: 2023-11-29

## 2023-11-29 RX ORDER — METOPROLOL TARTRATE 50 MG/1
TABLET, FILM COATED ORAL
Qty: 180 TABLET | Refills: 2 | Status: SHIPPED | OUTPATIENT
Start: 2023-11-29

## 2023-11-29 NOTE — TELEPHONE ENCOUNTER
Comments: please review    Last Office Visit (last PCP visit):   9/15/2023    Next Visit Date:  Future Appointments   Date Time Provider [address]   2/28/2024  1:00  [address], [address] [facility] [address]   3/15/2024 10:00 AM Jose Guadalupe Jain MD [facility] [place]       **If hasn't been seen in over a year OR hasn't followed up according to last diabetes/ADHD visit, make appointment for patient before sending refill to provider.     Rx requested:  Requested Prescriptions     Pending Prescriptions Disp Refills    hydroCHLOROthiazide (HYDRODIURIL) 25 MG tablet [Pharmacy Med Name: hydrochlorothiazide 25 mg tablet] 90 tablet 2     Sig: TAKE 1 TABLET BY MOUTH DAILY    metoprolol tartrate (LOPRESSOR) 50 MG tablet [Pharmacy Med Name: metoprolol tartrate 50 mg tablet] 180 tablet 2     Sig: TAKE 1 TABLET BY MOUTH TWICE DAILY

## 2024-02-26 RX ORDER — EZETIMIBE AND SIMVASTATIN 10; 20 MG/1; MG/1
TABLET ORAL
Qty: 30 TABLET | Refills: 2 | Status: SHIPPED | OUTPATIENT
Start: 2024-02-26

## 2024-02-26 NOTE — TELEPHONE ENCOUNTER
Comments:     Last Office Visit (last PCP visit):   9/15/2023    Next Visit Date:  Future Appointments   Date Time Provider Department Center   2/28/2024  1:00 PM Jeronimo Martinez DO [facility]   3/15/2024 10:00 AM Berto Santizo MD [facility]ia [facility]once       **If hasn't been seen in over a year OR hasn't followed up according to last diabetes/ADHD visit, make appointment for patient before sending refill to provider.    Rx requested:  Requested Prescriptions     Pending Prescriptions Disp Refills    ezetimibe-simvastatin (VYTORIN) 10-20 MG per tablet [Pharmacy Med Name: ezetimibe 10 mg-simvastatin 20 mg tablet] 30 tablet 2     Sig: TAKE 1 TABLET BY MOUTH NIGHTLY

## 2024-03-15 ENCOUNTER — OFFICE VISIT (OUTPATIENT)
Dept: FAMILY MEDICINE CLINIC | Age: 79
End: 2024-03-15

## 2024-03-15 VITALS
BODY MASS INDEX: 40.29 KG/M2 | TEMPERATURE: 96.8 F | HEIGHT: 64 IN | DIASTOLIC BLOOD PRESSURE: 80 MMHG | OXYGEN SATURATION: 98 % | HEART RATE: 44 BPM | SYSTOLIC BLOOD PRESSURE: 122 MMHG | WEIGHT: 236 LBS

## 2024-03-15 DIAGNOSIS — E79.0 HYPERURICEMIA: ICD-10-CM

## 2024-03-15 DIAGNOSIS — Z12.5 SCREENING PSA (PROSTATE SPECIFIC ANTIGEN): ICD-10-CM

## 2024-03-15 DIAGNOSIS — I10 ESSENTIAL HYPERTENSION: ICD-10-CM

## 2024-03-15 DIAGNOSIS — G47.33 OSA (OBSTRUCTIVE SLEEP APNEA): ICD-10-CM

## 2024-03-15 DIAGNOSIS — N40.0 BENIGN PROSTATIC HYPERPLASIA WITHOUT LOWER URINARY TRACT SYMPTOMS: ICD-10-CM

## 2024-03-15 DIAGNOSIS — M19.90 OSTEOARTHRITIS, UNSPECIFIED OSTEOARTHRITIS TYPE, UNSPECIFIED SITE: ICD-10-CM

## 2024-03-15 DIAGNOSIS — R73.03 PREDIABETES: ICD-10-CM

## 2024-03-15 DIAGNOSIS — E78.5 HYPERLIPIDEMIA, UNSPECIFIED HYPERLIPIDEMIA TYPE: ICD-10-CM

## 2024-03-15 DIAGNOSIS — I47.10 PSVT (PAROXYSMAL SUPRAVENTRICULAR TACHYCARDIA): ICD-10-CM

## 2024-03-15 DIAGNOSIS — N18.30 STAGE 3 CHRONIC KIDNEY DISEASE, UNSPECIFIED WHETHER STAGE 3A OR 3B CKD (HCC): ICD-10-CM

## 2024-03-15 DIAGNOSIS — E66.01 OBESITY, CLASS III, BMI 40-49.9 (MORBID OBESITY) (HCC): ICD-10-CM

## 2024-03-15 DIAGNOSIS — E79.0 HYPERURICEMIA: Primary | ICD-10-CM

## 2024-03-15 LAB
ALBUMIN SERPL-MCNC: 4.2 G/DL (ref 3.5–4.6)
ALP SERPL-CCNC: 74 U/L (ref 35–104)
ALT SERPL-CCNC: 27 U/L (ref 0–41)
ANION GAP SERPL CALCULATED.3IONS-SCNC: 13 MEQ/L (ref 9–15)
AST SERPL-CCNC: 34 U/L (ref 0–40)
BILIRUB SERPL-MCNC: 0.4 MG/DL (ref 0.2–0.7)
BUN SERPL-MCNC: 22 MG/DL (ref 8–23)
CALCIUM SERPL-MCNC: 9.9 MG/DL (ref 8.5–9.9)
CHLORIDE SERPL-SCNC: 102 MEQ/L (ref 95–107)
CHOLEST SERPL-MCNC: 114 MG/DL (ref 0–199)
CO2 SERPL-SCNC: 26 MEQ/L (ref 20–31)
CREAT SERPL-MCNC: 1.38 MG/DL (ref 0.7–1.2)
ERYTHROCYTE [DISTWIDTH] IN BLOOD BY AUTOMATED COUNT: 13.7 % (ref 11.5–14.5)
GLOBULIN SER CALC-MCNC: 3.2 G/DL (ref 2.3–3.5)
GLUCOSE SERPL-MCNC: 121 MG/DL (ref 70–99)
HBA1C MFR BLD: 6.6 % (ref 4.8–5.9)
HCT VFR BLD AUTO: 45.4 % (ref 42–52)
HDLC SERPL-MCNC: 39 MG/DL (ref 40–59)
HGB BLD-MCNC: 15.1 G/DL (ref 14–18)
LDLC SERPL CALC-MCNC: 43 MG/DL (ref 0–129)
MCH RBC QN AUTO: 30.5 PG (ref 27–31.3)
MCHC RBC AUTO-ENTMCNC: 33.3 % (ref 33–37)
MCV RBC AUTO: 91.7 FL (ref 79–92.2)
PLATELET # BLD AUTO: 188 K/UL (ref 130–400)
POTASSIUM SERPL-SCNC: 4.9 MEQ/L (ref 3.4–4.9)
PROT SERPL-MCNC: 7.4 G/DL (ref 6.3–8)
PSA SERPL-MCNC: 0.45 NG/ML (ref 0–4)
RBC # BLD AUTO: 4.95 M/UL (ref 4.7–6.1)
SODIUM SERPL-SCNC: 141 MEQ/L (ref 135–144)
TRIGL SERPL-MCNC: 158 MG/DL (ref 0–150)
URATE SERPL-MCNC: 7.1 MG/DL (ref 3.4–7)
WBC # BLD AUTO: 7.3 K/UL (ref 4.8–10.8)

## 2024-03-15 RX ORDER — TERAZOSIN 10 MG/1
10 CAPSULE ORAL NIGHTLY
Qty: 90 CAPSULE | Refills: 3 | Status: SHIPPED | OUTPATIENT
Start: 2024-03-15

## 2024-03-15 RX ORDER — FINASTERIDE 5 MG/1
5 TABLET, FILM COATED ORAL DAILY
Qty: 90 TABLET | Refills: 3 | Status: SHIPPED | OUTPATIENT
Start: 2024-03-15

## 2024-03-15 SDOH — ECONOMIC STABILITY: INCOME INSECURITY: HOW HARD IS IT FOR YOU TO PAY FOR THE VERY BASICS LIKE FOOD, HOUSING, MEDICAL CARE, AND HEATING?: NOT HARD AT ALL

## 2024-03-15 SDOH — ECONOMIC STABILITY: FOOD INSECURITY: WITHIN THE PAST 12 MONTHS, THE FOOD YOU BOUGHT JUST DIDN'T LAST AND YOU DIDN'T HAVE MONEY TO GET MORE.: NEVER TRUE

## 2024-03-15 SDOH — ECONOMIC STABILITY: FOOD INSECURITY: WITHIN THE PAST 12 MONTHS, YOU WORRIED THAT YOUR FOOD WOULD RUN OUT BEFORE YOU GOT MONEY TO BUY MORE.: NEVER TRUE

## 2024-03-15 ASSESSMENT — PATIENT HEALTH QUESTIONNAIRE - PHQ9
SUM OF ALL RESPONSES TO PHQ QUESTIONS 1-9: 0
2. FEELING DOWN, DEPRESSED OR HOPELESS: 0
SUM OF ALL RESPONSES TO PHQ9 QUESTIONS 1 & 2: 0
1. LITTLE INTEREST OR PLEASURE IN DOING THINGS: 0

## 2024-03-15 NOTE — PROGRESS NOTES
issues     Chronic issues are generally well-controlled.                   Berto Santizo MD

## 2024-03-20 ENCOUNTER — TELEPHONE (OUTPATIENT)
Dept: FAMILY MEDICINE CLINIC | Age: 79
End: 2024-03-20

## 2024-03-21 ENCOUNTER — OFFICE VISIT (OUTPATIENT)
Dept: CARDIOLOGY CLINIC | Age: 79
End: 2024-03-21
Payer: MEDICARE

## 2024-03-21 VITALS
OXYGEN SATURATION: 95 % | DIASTOLIC BLOOD PRESSURE: 68 MMHG | BODY MASS INDEX: 40.43 KG/M2 | HEART RATE: 54 BPM | HEIGHT: 64 IN | WEIGHT: 236.8 LBS | SYSTOLIC BLOOD PRESSURE: 126 MMHG

## 2024-03-21 DIAGNOSIS — I10 HYPERTENSION, UNSPECIFIED TYPE: Primary | ICD-10-CM

## 2024-03-21 PROCEDURE — 1123F ACP DISCUSS/DSCN MKR DOCD: CPT | Performed by: INTERNAL MEDICINE

## 2024-03-21 PROCEDURE — G8427 DOCREV CUR MEDS BY ELIG CLIN: HCPCS | Performed by: INTERNAL MEDICINE

## 2024-03-21 PROCEDURE — G8417 CALC BMI ABV UP PARAM F/U: HCPCS | Performed by: INTERNAL MEDICINE

## 2024-03-21 PROCEDURE — G8484 FLU IMMUNIZE NO ADMIN: HCPCS | Performed by: INTERNAL MEDICINE

## 2024-03-21 PROCEDURE — 93000 ELECTROCARDIOGRAM COMPLETE: CPT | Performed by: INTERNAL MEDICINE

## 2024-03-21 PROCEDURE — 3078F DIAST BP <80 MM HG: CPT | Performed by: INTERNAL MEDICINE

## 2024-03-21 PROCEDURE — 99214 OFFICE O/P EST MOD 30 MIN: CPT | Performed by: INTERNAL MEDICINE

## 2024-03-21 PROCEDURE — 1036F TOBACCO NON-USER: CPT | Performed by: INTERNAL MEDICINE

## 2024-03-21 PROCEDURE — 3074F SYST BP LT 130 MM HG: CPT | Performed by: INTERNAL MEDICINE

## 2024-03-21 NOTE — PROGRESS NOTES
3/21/2024    Berto Santizo MD  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  He was previously following with Dr. Barclay.  Currently, reports intermittent palpitations that come and go. Episodes are described as \"racing\" and skipping that last a couple of minutes before gradually resolving. He reports associated shortness of breath but no near syncope or syncope. He denies any chest pain or limiting exertional dyspnea with activity like 30 minutes walking on a treadmill at 2.5 miles per hour 2-3x/week. He does get dizzy with position changes. He denies any prior history of atrial fibrillation.     2023: Patient here for cardiac clearance.  He is tentatively scheduled for right knee replacement surgery on 2023.  He has been less active due to ongoing right knee pain.  He denies any chest pain with his daily activities.  He has dyspnea with more physical degrees of activity as well as worsening right knee pain.  He was previously having more palpitations but it has significantly improved as of late.  Recent event monitor showed runs of PSVT but no A-fib.  No near-syncope or syncope.  Reports compliance with medications.    3/21/2024: Patient here for follow-up of symptomatic PACs and PSVT.  Currently, he reports feeling reasonably well.  He denies any worsening of palpitations.  He says \"they are about the same\".  He remains active exercising on a treadmill at 2.5 mph 3 days a week without any chest pain or limiting exertional dyspnea.  No near-syncope or syncope.  He reports compliance with medications most of the time.    Current medications:   Current Outpatient Medications   Medication Sig Dispense Refill    finasteride (PROSCAR) 5 MG tablet Take 1

## 2024-03-26 RX ORDER — ALLOPURINOL 100 MG/1
TABLET ORAL
Qty: 30 TABLET | Refills: 5 | Status: SHIPPED | OUTPATIENT
Start: 2024-03-26

## 2024-03-26 NOTE — TELEPHONE ENCOUNTER
Comments:     Last Office Visit (last PCP visit):   3/15/2024    Next Visit Date:  Future Appointments   Date Time Provider Department Center   9/16/2024  9:30 AM Berto Santizo MD [facility]   3/27/2025 10:20 AM Jeronimo Martinez DO [facility]       **If hasn't been seen in over a year OR hasn't followed up according to last diabetes/ADHD visit, make appointment for patient before sending refill to provider.    Rx requested:  Requested Prescriptions     Pending Prescriptions Disp Refills    allopurinol (ZYLOPRIM) 100 MG tablet [Pharmacy Med Name: allopurinol 100 mg tablet] 30 tablet 5     Sig: TAKE 1 TABLET BY MOUTH DAILY

## 2024-04-03 ENCOUNTER — TELEPHONE (OUTPATIENT)
Dept: FAMILY MEDICINE CLINIC | Age: 79
End: 2024-04-03

## 2024-04-03 NOTE — TELEPHONE ENCOUNTER
Called to complete medicare AWV. No answer, left a message. If calls back please feel free to put on my schedule.

## 2024-04-12 ENCOUNTER — TELEMEDICINE (OUTPATIENT)
Dept: FAMILY MEDICINE CLINIC | Age: 79
End: 2024-04-12

## 2024-04-12 DIAGNOSIS — I10 ESSENTIAL HYPERTENSION: ICD-10-CM

## 2024-04-12 DIAGNOSIS — G47.33 OSA (OBSTRUCTIVE SLEEP APNEA): ICD-10-CM

## 2024-04-12 DIAGNOSIS — E79.0 HYPERURICEMIA: ICD-10-CM

## 2024-04-12 DIAGNOSIS — Z00.00 MEDICARE ANNUAL WELLNESS VISIT, SUBSEQUENT: Primary | ICD-10-CM

## 2024-04-12 DIAGNOSIS — E78.5 HYPERLIPIDEMIA, UNSPECIFIED HYPERLIPIDEMIA TYPE: ICD-10-CM

## 2024-04-12 DIAGNOSIS — N18.30 STAGE 3 CHRONIC KIDNEY DISEASE, UNSPECIFIED WHETHER STAGE 3A OR 3B CKD (HCC): ICD-10-CM

## 2024-04-12 DIAGNOSIS — I47.10 PSVT (PAROXYSMAL SUPRAVENTRICULAR TACHYCARDIA) (HCC): ICD-10-CM

## 2024-04-12 DIAGNOSIS — R73.03 PREDIABETES: ICD-10-CM

## 2024-04-12 ASSESSMENT — PATIENT HEALTH QUESTIONNAIRE - PHQ9
SUM OF ALL RESPONSES TO PHQ9 QUESTIONS 1 & 2: 1
SUM OF ALL RESPONSES TO PHQ QUESTIONS 1-9: 1
2. FEELING DOWN, DEPRESSED OR HOPELESS: NOT AT ALL
SUM OF ALL RESPONSES TO PHQ QUESTIONS 1-9: 1
SUM OF ALL RESPONSES TO PHQ QUESTIONS 1-9: 1
1. LITTLE INTEREST OR PLEASURE IN DOING THINGS: SEVERAL DAYS
SUM OF ALL RESPONSES TO PHQ QUESTIONS 1-9: 1

## 2024-04-12 ASSESSMENT — LIFESTYLE VARIABLES
HOW OFTEN DO YOU HAVE A DRINK CONTAINING ALCOHOL: NEVER
HOW MANY STANDARD DRINKS CONTAINING ALCOHOL DO YOU HAVE ON A TYPICAL DAY: PATIENT DOES NOT DRINK

## 2024-04-12 NOTE — PROGRESS NOTES
service, which includes applicable co-pays. This Virtual Visit was conducted with patient's (and/or legal guardian's) consent. Patient identification was verified, and a caregiver was present when appropriate.   The patient was located at Home: [address] [place] [address] [address]  [place] [address]  Provider was located at Facility (Appt Dept): [address]  [place] [address]  Confirm you are appropriately licensed, registered, or certified to deliver care in the state where the patient is located as indicated above. If you are not or unsure, please re-schedule the visit: Yes, I confirm.

## 2024-04-16 RX ORDER — POLYETHYLENE GLYCOL 3350, SODIUM CHLORIDE, SODIUM BICARBONATE, POTASSIUM CHLORIDE 420; 11.2; 5.72; 1.48 G/4L; G/4L; G/4L; G/4L
4000 POWDER, FOR SOLUTION ORAL ONCE
Qty: 4000 ML | Refills: 0 | Status: SHIPPED | OUTPATIENT
Start: 2024-04-16 | End: 2024-04-16

## 2024-04-23 ENCOUNTER — PREP FOR PROCEDURE (OUTPATIENT)
Dept: GASTROENTEROLOGY | Age: 79
End: 2024-04-23

## 2024-04-23 RX ORDER — SODIUM CHLORIDE 0.9 % (FLUSH) 0.9 %
5-40 SYRINGE (ML) INJECTION EVERY 12 HOURS SCHEDULED
Status: CANCELLED | OUTPATIENT
Start: 2024-04-23

## 2024-04-23 RX ORDER — SODIUM CHLORIDE 0.9 % (FLUSH) 0.9 %
5-40 SYRINGE (ML) INJECTION PRN
Status: CANCELLED | OUTPATIENT
Start: 2024-04-23

## 2024-04-23 RX ORDER — SODIUM CHLORIDE 9 MG/ML
INJECTION, SOLUTION INTRAVENOUS CONTINUOUS
Status: CANCELLED | OUTPATIENT
Start: 2024-04-23

## 2024-04-23 RX ORDER — SODIUM CHLORIDE 9 MG/ML
INJECTION, SOLUTION INTRAVENOUS PRN
Status: CANCELLED | OUTPATIENT
Start: 2024-04-23

## 2024-05-03 ENCOUNTER — ANESTHESIA EVENT (OUTPATIENT)
Dept: ENDOSCOPY | Age: 79
End: 2024-05-03
Payer: MEDICARE

## 2024-05-03 NOTE — ANESTHESIA PRE PROCEDURE
Department of Anesthesiology  Preprocedure Note       Name:  Daniel Rangel Jr.   Age:  79 y.o.  :  1945                                          MRN:  [id number]         Date:  5/3/2024      Surgeon: Surgeon(s):  Cassandra Gordillo MD    Procedure: Procedure(s):  COLORECTAL CANCER SCREENING, NOT HIGH RISK    Medications prior to admission:   Prior to Admission medications    Medication Sig Start Date End Date Taking? Authorizing Provider   allopurinol (ZYLOPRIM) 100 MG tablet TAKE 1 TABLET BY MOUTH DAILY 3/26/24   Berto Santizo MD   finasteride (PROSCAR) 5 MG tablet Take 1 tablet by mouth daily 3/15/24   Berto Santizo MD   terazosin (HYTRIN) 10 MG capsule Take 1 capsule by mouth nightly 3/15/24   Berto Santizo MD   ezetimibe-simvastatin (VYTORIN) 10-20 MG per tablet TAKE 1 TABLET BY MOUTH NIGHTLY 24   Berto Santizo MD   hydroCHLOROthiazide (HYDRODIURIL) 25 MG tablet TAKE 1 TABLET BY MOUTH DAILY 23   Berto Santizo MD   metoprolol tartrate (LOPRESSOR) 50 MG tablet TAKE 1 TABLET BY MOUTH TWICE DAILY 23   Berto Santizo MD   Multiple Vitamins-Minerals (MULTI COMPLETE) CAPS Take by mouth    Jarvis Auguste MD   omeprazole (PRILOSEC) 10 MG delayed release capsule Take 1 capsule by mouth Takes twice a week    Jarvis Auguste MD   ibuprofen (ADVIL;MOTRIN) 200 MG tablet Take 1 tablet by mouth every 6 hours as needed for Pain    Jarvis Auguste MD   docusate sodium (COLACE) 100 MG capsule Take 1 capsule by mouth 2 times daily    Jarvis Auguste MD   Coenzyme Q10 (CO Q 10 PO) Take  by mouth.    Jarvis Auguste MD   aspirin 325 MG tablet Take 1 tablet by mouth daily    Jarvis Auguste MD       Current medications:    No current facility-administered medications for this encounter.     Current Outpatient Medications   Medication Sig Dispense Refill    allopurinol (ZYLOPRIM) 100 MG tablet TAKE 1 TABLET BY MOUTH DAILY 30 tablet 5    finasteride (PROSCAR) 5 MG tablet Take 1

## 2024-05-06 ENCOUNTER — HOSPITAL ENCOUNTER (OUTPATIENT)
Age: 79
Setting detail: OUTPATIENT SURGERY
Discharge: HOME | End: 2024-05-06
Attending: SPECIALIST | Admitting: SPECIALIST
Payer: MEDICARE

## 2024-05-06 ENCOUNTER — ANESTHESIA (OUTPATIENT)
Dept: ENDOSCOPY | Age: 79
End: 2024-05-06
Payer: MEDICARE

## 2024-05-06 VITALS
SYSTOLIC BLOOD PRESSURE: 156 MMHG | DIASTOLIC BLOOD PRESSURE: 85 MMHG | BODY MASS INDEX: 39.09 KG/M2 | TEMPERATURE: 97.5 F | HEIGHT: 64 IN | WEIGHT: 229 LBS | OXYGEN SATURATION: 92 % | RESPIRATION RATE: 18 BRPM | HEART RATE: 52 BPM

## 2024-05-06 DIAGNOSIS — Z12.11 COLON CANCER SCREENING: ICD-10-CM

## 2024-05-06 PROCEDURE — 3609027000 HC COLONOSCOPY: Performed by: SPECIALIST

## 2024-05-06 PROCEDURE — 2500000003 HC RX 250 WO HCPCS

## 2024-05-06 PROCEDURE — 7100000011 HC PHASE II RECOVERY - ADDTL 15 MIN: Performed by: SPECIALIST

## 2024-05-06 PROCEDURE — 3700000000 HC ANESTHESIA ATTENDED CARE: Performed by: SPECIALIST

## 2024-05-06 PROCEDURE — 2580000003 HC RX 258: Performed by: SPECIALIST

## 2024-05-06 PROCEDURE — 2580000003 HC RX 258

## 2024-05-06 PROCEDURE — 7100000010 HC PHASE II RECOVERY - FIRST 15 MIN: Performed by: SPECIALIST

## 2024-05-06 PROCEDURE — 6370000000 HC RX 637 (ALT 250 FOR IP): Performed by: SPECIALIST

## 2024-05-06 PROCEDURE — 88305 TISSUE EXAM BY PATHOLOGIST: CPT

## 2024-05-06 PROCEDURE — 2709999900 HC NON-CHARGEABLE SUPPLY: Performed by: SPECIALIST

## 2024-05-06 PROCEDURE — 3700000001 HC ADD 15 MINUTES (ANESTHESIA): Performed by: SPECIALIST

## 2024-05-06 PROCEDURE — 6360000002 HC RX W HCPCS

## 2024-05-06 RX ORDER — SODIUM CHLORIDE 9 MG/ML
INJECTION, SOLUTION INTRAVENOUS CONTINUOUS
Status: DISCONTINUED | OUTPATIENT
Start: 2024-05-06 | End: 2024-05-06 | Stop reason: HOSPADM

## 2024-05-06 RX ORDER — LIDOCAINE HYDROCHLORIDE 20 MG/ML
INJECTION, SOLUTION INFILTRATION; PERINEURAL PRN
Status: DISCONTINUED | OUTPATIENT
Start: 2024-05-06 | End: 2024-05-06 | Stop reason: SDUPTHER

## 2024-05-06 RX ORDER — SODIUM CHLORIDE 0.9 % (FLUSH) 0.9 %
5-40 SYRINGE (ML) INJECTION EVERY 12 HOURS SCHEDULED
Status: DISCONTINUED | OUTPATIENT
Start: 2024-05-06 | End: 2024-05-06 | Stop reason: HOSPADM

## 2024-05-06 RX ORDER — SODIUM CHLORIDE 9 MG/ML
INJECTION, SOLUTION INTRAVENOUS
Status: COMPLETED
Start: 2024-05-06 | End: 2024-05-06

## 2024-05-06 RX ORDER — PROPOFOL 10 MG/ML
INJECTION, EMULSION INTRAVENOUS PRN
Status: DISCONTINUED | OUTPATIENT
Start: 2024-05-06 | End: 2024-05-06 | Stop reason: SDUPTHER

## 2024-05-06 RX ORDER — SIMETHICONE 40MG/0.6ML
SUSPENSION, DROPS(FINAL DOSAGE FORM)(ML) ORAL PRN
Status: DISCONTINUED | OUTPATIENT
Start: 2024-05-06 | End: 2024-05-06 | Stop reason: ALTCHOICE

## 2024-05-06 RX ORDER — SODIUM CHLORIDE 9 MG/ML
INJECTION, SOLUTION INTRAVENOUS PRN
Status: DISCONTINUED | OUTPATIENT
Start: 2024-05-06 | End: 2024-05-06 | Stop reason: HOSPADM

## 2024-05-06 RX ORDER — SODIUM CHLORIDE 0.9 % (FLUSH) 0.9 %
5-40 SYRINGE (ML) INJECTION PRN
Status: DISCONTINUED | OUTPATIENT
Start: 2024-05-06 | End: 2024-05-06 | Stop reason: HOSPADM

## 2024-05-06 RX ADMIN — LIDOCAINE HYDROCHLORIDE 3 ML: 20 INJECTION, SOLUTION INFILTRATION; PERINEURAL at 10:40

## 2024-05-06 RX ADMIN — PROPOFOL 50 MG: 10 INJECTION, EMULSION INTRAVENOUS at 10:51

## 2024-05-06 RX ADMIN — PROPOFOL 20 MG: 10 INJECTION, EMULSION INTRAVENOUS at 10:56

## 2024-05-06 RX ADMIN — SODIUM CHLORIDE: 9 INJECTION, SOLUTION INTRAVENOUS at 09:27

## 2024-05-06 RX ADMIN — SODIUM CHLORIDE: 9 INJECTION, SOLUTION INTRAVENOUS at 10:08

## 2024-05-06 RX ADMIN — PROPOFOL 100 MG: 10 INJECTION, EMULSION INTRAVENOUS at 10:40

## 2024-05-06 RX ADMIN — PROPOFOL 50 MG: 10 INJECTION, EMULSION INTRAVENOUS at 10:45

## 2024-05-06 ASSESSMENT — PAIN - FUNCTIONAL ASSESSMENT: PAIN_FUNCTIONAL_ASSESSMENT: 0-10

## 2024-05-06 NOTE — H&P
Patient Name: Daniel Rangel Jr.  : 1945  MRN: [id number]  DATE: 24      ENDOSCOPY  History and Physical    Procedure:    [] Diagnostic Colonoscopy       [x] Screening Colonoscopy  [] EGD      [] ERCP      [] EUS       [] Other    [x] Previous office notes/History and Physical reviewed from the patients chart. Please see EMR for further details of HPI. I have examined the patient's status immediately prior to the procedure and:      Indications/HPI:    []Abdominal Pain  []Cancer- GI/Lung  []Fhx of colon CA/polyps  []History of Polyps  []Phillips’s   []Melena  []Abnormal Imaging  []Dysphagia    []Persistent Pneumonia  []Anemia  []Food Impaction  []History of Polyps  []GI Bleed  []Pulmonary nodule/Mass  []Change in bowel habits []Heartburn/Reflux  []Rectal Bleed (BRBPR)  []Chest Pain - Non Cardiac []Heme (+) Stoo  l[]Ulcers  []Constipation  []Hemoptysis   []Varices  []Diarrhea  []Hypoxemia  []Nausea/Vomiting  []Screening   []Crohns/Colitis  []Other:     Anesthesia:   [x] MAC [] Moderate Sedation   [] General   [] None     ROS: 12 pt Review of Symptoms was negative unless mentioned above    Medications:   Prior to Admission medications    Medication Sig Start Date End Date Taking? Authorizing Provider   allopurinol (ZYLOPRIM) 100 MG tablet TAKE 1 TABLET BY MOUTH DAILY 3/26/24   Berto Santizo MD   finasteride (PROSCAR) 5 MG tablet Take 1 tablet by mouth daily 3/15/24   Berto Santizo MD   terazosin (HYTRIN) 10 MG capsule Take 1 capsule by mouth nightly 3/15/24   Berto Santizo MD   ezetimibe-simvastatin (VYTORIN) 10-20 MG per tablet TAKE 1 TABLET BY MOUTH NIGHTLY 24   Berto Santizo MD   hydroCHLOROthiazide (HYDRODIURIL) 25 MG tablet TAKE 1 TABLET BY MOUTH DAILY 23   Berto Santizo MD   metoprolol tartrate (LOPRESSOR) 50 MG tablet TAKE 1 TABLET BY MOUTH TWICE DAILY 23   Berto Santizo MD   Multiple Vitamins-Minerals (MULTI COMPLETE) CAPS Take by mouth    Provider, MD Jarvis   omeprazole

## 2024-05-06 NOTE — ANESTHESIA POSTPROCEDURE EVALUATION
Department of Anesthesiology  Postprocedure Note    Patient: Daniel Rangel Jr.  MRN: [id number]  YOB: 1945  Date of evaluation: 5/6/2024    Procedure Summary       Date: 05/06/24 Room / Location: [facility] OR 01 / [facility]    Anesthesia Start: 1037 Anesthesia Stop: 1100    Procedure: COLORECTAL CANCER SCREENING, NOT HIGH RISK with polypectomy Diagnosis:       Colon cancer screening      (Colon cancer screening [Z12.11])    Surgeons: Cassandra Gordillo MD Responsible Provider: Angie Pitst APRN - CRNA    Anesthesia Type: MAC ASA Status: 3            Anesthesia Type: No value filed.    Toro Phase I: Toro Score: 10    Toro Phase II:      Anesthesia Post Evaluation    Patient location during evaluation: bedside  Patient participation: complete - patient participated  Level of consciousness: awake and awake and alert  Airway patency: patent  Nausea & Vomiting: no nausea and no vomiting  Cardiovascular status: blood pressure returned to baseline and hemodynamically stable  Respiratory status: acceptable  Hydration status: euvolemic  Pain management: adequate        No notable events documented.

## 2024-05-28 RX ORDER — EZETIMIBE AND SIMVASTATIN 10; 20 MG/1; MG/1
TABLET ORAL
Qty: 30 TABLET | Refills: 2 | Status: SHIPPED | OUTPATIENT
Start: 2024-05-28

## 2024-05-28 NOTE — TELEPHONE ENCOUNTER
Comments:     Last Office Visit (last PCP visit):   4/12/2024    Next Visit Date:  Future Appointments   Date Time Provider Department Center   9/16/2024  9:30 AM Berto Santizo MD [facility]   3/27/2025 10:20 AM Jeronimo Martinez DO [facility]       **If hasn't been seen in over a year OR hasn't followed up according to last diabetes/ADHD visit, make appointment for patient before sending refill to provider.    Rx requested:  Requested Prescriptions     Pending Prescriptions Disp Refills    ezetimibe-simvastatin (VYTORIN) 10-20 MG per tablet [Pharmacy Med Name: ezetimibe 10 mg-simvastatin 20 mg tablet] 30 tablet 2     Sig: TAKE 1 TABLET BY MOUTH NIGHTLY

## 2024-06-05 PROBLEM — Z12.11 COLON CANCER SCREENING: Status: RESOLVED | Noted: 2024-05-06 | Resolved: 2024-06-05

## 2024-08-09 ENCOUNTER — TELEPHONE (OUTPATIENT)
Dept: FAMILY MEDICINE CLINIC | Age: 79
End: 2024-08-09

## 2024-08-09 NOTE — TELEPHONE ENCOUNTER
[facility] epic [facility]    Need to find different pharmacy and if able to get filled, will have to stop statin medication for 10 days

## 2024-08-09 NOTE — TELEPHONE ENCOUNTER
Orders Placed This Encounter   Medications    DISCONTD: nirmatrelvir/ritonavir 300/100 (PAXLOVID) 20 x 150 MG & 10 x 100MG TBPK     Sig: Take 3 tablets (two 150 mg nirmatrelvir and one 100 mg ritonavir tablets) by mouth every 12 hours for 5 days.     Dispense:  30 tablet     Refill:  0     Order Specific Question:   Does this patient qualify for COVID-19 antIviral therapy based on criteria for treatment?     Answer:   Yes    nirmatrelvir/ritonavir 300/100 (PAXLOVID) 20 x 150 MG & 10 x 100MG TBPK     Sig: Take 3 tablets (two 150 mg nirmatrelvir and one 100 mg ritonavir tablets) by mouth every 12 hours for 5 days.     Dispense:  30 tablet     Refill:  0     Order Specific Question:   Does this patient qualify for COVID-19 antIviral therapy based on criteria for treatment?     Answer:   Yes       The above med(s) were e-scripted to the patient's pharmacy.   Please advise patient  Berto Santizo MD

## 2024-08-09 NOTE — TELEPHONE ENCOUNTER
Pt's wife calling said pt tested positive for covid yesterday wants top know if the medication can be called in for him to [facility] [phone]

## 2024-08-09 NOTE — TELEPHONE ENCOUNTER
Pt calling requesting that the paxlovid be called into [facility]. [facility] is closing on 8/20 and does not carry the paxlovid anylonger. [facility] does not carry it either. Spoke with pharmacy [facility] and they are just now starting to carry it. Please resend to [facility]

## 2024-08-19 NOTE — TELEPHONE ENCOUNTER
Comments:     Last Office Visit (last PCP visit):   4/12/2024    Next Visit Date:  Future Appointments   Date Time Provider Department Center   9/16/2024  9:30 AM Berto Santizo MD [facility]   3/27/2025 10:20 AM Jeronimo Martinez DO [facility]       **If hasn't been seen in over a year OR hasn't followed up according to last diabetes/ADHD visit, make appointment for patient before sending refill to provider.    Rx requested:  Requested Prescriptions     Pending Prescriptions Disp Refills    metoprolol tartrate (LOPRESSOR) 50 MG tablet [Pharmacy Med Name: metoprolol tartrate 50 mg tablet] 180 tablet 2     Sig: TAKE 1 TABLET BY MOUTH TWICE DAILY    hydroCHLOROthiazide (HYDRODIURIL) 25 MG tablet [Pharmacy Med Name: hydrochlorothiazide 25 mg tablet] 90 tablet 2     Sig: TAKE 1 TABLET BY MOUTH DAILY

## 2024-08-20 RX ORDER — METOPROLOL TARTRATE 50 MG/1
TABLET, FILM COATED ORAL
Qty: 180 TABLET | Refills: 2 | Status: SHIPPED | OUTPATIENT
Start: 2024-08-20

## 2024-08-20 RX ORDER — HYDROCHLOROTHIAZIDE 25 MG/1
TABLET ORAL
Qty: 90 TABLET | Refills: 2 | Status: SHIPPED | OUTPATIENT
Start: 2024-08-20

## 2024-08-26 RX ORDER — EZETIMIBE AND SIMVASTATIN 10; 20 MG/1; MG/1
TABLET ORAL
Qty: 30 TABLET | Refills: 2 | Status: SHIPPED | OUTPATIENT
Start: 2024-08-26

## 2024-08-30 ENCOUNTER — TELEPHONE (OUTPATIENT)
Dept: PRIMARY CARE | Facility: CLINIC | Age: 79
End: 2024-08-30

## 2024-09-16 ENCOUNTER — OFFICE VISIT (OUTPATIENT)
Dept: FAMILY MEDICINE CLINIC | Age: 79
End: 2024-09-16
Payer: MEDICARE

## 2024-09-16 VITALS
HEIGHT: 64 IN | DIASTOLIC BLOOD PRESSURE: 84 MMHG | TEMPERATURE: 97.8 F | OXYGEN SATURATION: 96 % | BODY MASS INDEX: 38.99 KG/M2 | WEIGHT: 228.4 LBS | SYSTOLIC BLOOD PRESSURE: 136 MMHG | HEART RATE: 72 BPM

## 2024-09-16 DIAGNOSIS — E79.0 HYPERURICEMIA: ICD-10-CM

## 2024-09-16 DIAGNOSIS — R73.03 PREDIABETES: ICD-10-CM

## 2024-09-16 DIAGNOSIS — I10 ESSENTIAL HYPERTENSION: Primary | ICD-10-CM

## 2024-09-16 DIAGNOSIS — G47.33 OSA (OBSTRUCTIVE SLEEP APNEA): ICD-10-CM

## 2024-09-16 DIAGNOSIS — N18.30 STAGE 3 CHRONIC KIDNEY DISEASE, UNSPECIFIED WHETHER STAGE 3A OR 3B CKD (HCC): ICD-10-CM

## 2024-09-16 PROCEDURE — 1036F TOBACCO NON-USER: CPT | Performed by: FAMILY MEDICINE

## 2024-09-16 PROCEDURE — 1123F ACP DISCUSS/DSCN MKR DOCD: CPT | Performed by: FAMILY MEDICINE

## 2024-09-16 PROCEDURE — 3075F SYST BP GE 130 - 139MM HG: CPT | Performed by: FAMILY MEDICINE

## 2024-09-16 PROCEDURE — 99214 OFFICE O/P EST MOD 30 MIN: CPT | Performed by: FAMILY MEDICINE

## 2024-09-16 PROCEDURE — 3079F DIAST BP 80-89 MM HG: CPT | Performed by: FAMILY MEDICINE

## 2024-09-16 PROCEDURE — G8417 CALC BMI ABV UP PARAM F/U: HCPCS | Performed by: FAMILY MEDICINE

## 2024-09-16 PROCEDURE — G8427 DOCREV CUR MEDS BY ELIG CLIN: HCPCS | Performed by: FAMILY MEDICINE

## 2024-09-17 LAB
ESTIMATED AVERAGE GLUCOSE: 140 MG/DL
HBA1C MFR BLD: 6.5 % (ref 4–6)

## 2024-09-18 RX ORDER — ALLOPURINOL 100 MG/1
100 TABLET ORAL DAILY
Qty: 30 TABLET | Refills: 5 | Status: SHIPPED | OUTPATIENT
Start: 2024-09-18

## 2024-11-18 RX ORDER — EZETIMIBE AND SIMVASTATIN 10; 20 MG/1; MG/1
TABLET ORAL
Qty: 30 TABLET | Refills: 5 | Status: SHIPPED | OUTPATIENT
Start: 2024-11-18

## 2024-11-18 NOTE — TELEPHONE ENCOUNTER
Comments:     Last Office Visit (last PCP visit):   9/16/2024    Next Visit Date:  Future Appointments   Date Time Provider Department Center   3/17/2025  9:30 AM Berto Santizo MD [facility]   3/27/2025 10:20 AM Jeronimo Martinez DO [facility]       **If hasn't been seen in over a year OR hasn't followed up according to last diabetes/ADHD visit, make appointment for patient before sending refill to provider.    Rx requested:  Requested Prescriptions     Pending Prescriptions Disp Refills    ezetimibe-simvastatin (VYTORIN) 10-20 MG per tablet [Pharmacy Med Name: ezetimibe 10 mg-simvastatin 20 mg tablet] 30 tablet 2     Sig: TAKE 1 TABLET BY MOUTH NIGHTLY

## 2025-03-11 DIAGNOSIS — N40.0 BENIGN PROSTATIC HYPERPLASIA WITHOUT LOWER URINARY TRACT SYMPTOMS: ICD-10-CM

## 2025-03-11 RX ORDER — ALLOPURINOL 100 MG/1
100 TABLET ORAL DAILY
Qty: 30 TABLET | Refills: 3 | Status: SHIPPED | OUTPATIENT
Start: 2025-03-11

## 2025-03-11 RX ORDER — TERAZOSIN 10 MG/1
10 CAPSULE ORAL NIGHTLY
Qty: 90 CAPSULE | Refills: 3 | Status: SHIPPED | OUTPATIENT
Start: 2025-03-11

## 2025-03-11 RX ORDER — FINASTERIDE 5 MG/1
5 TABLET, FILM COATED ORAL DAILY
Qty: 90 TABLET | Refills: 3 | Status: SHIPPED | OUTPATIENT
Start: 2025-03-11

## 2025-03-11 NOTE — TELEPHONE ENCOUNTER
Comments:     Last Office Visit (last PCP visit):   9/16/2024    Next Visit Date:  Future Appointments   Date Time Provider Department Center   3/17/2025  9:30 AM Berto Santizo MD [facility]   3/27/2025 10:20 AM Jeronimo Martinez DO [facility]       **If hasn't been seen in over a year OR hasn't followed up according to last diabetes/ADHD visit, make appointment for patient before sending refill to provider.    Rx requested:  Requested Prescriptions     Pending Prescriptions Disp Refills    finasteride (PROSCAR) 5 MG tablet [Pharmacy Med Name: finasteride 5 mg tablet] 90 tablet 3     Sig: TAKE 1 TABLET BY MOUTH ONCE DAILY    terazosin (HYTRIN) 10 MG capsule [Pharmacy Med Name: terazosin 10 mg capsule] 90 capsule 3     Sig: TAKE 1 CAPSULE BY MOUTH NIGHTLY    allopurinol (ZYLOPRIM) 100 MG tablet [Pharmacy Med Name: allopurinol 100 mg tablet] 30 tablet 3     Sig: TAKE 1 TABLET BY MOUTH ONCE DAILY

## 2025-03-17 ENCOUNTER — OFFICE VISIT (OUTPATIENT)
Dept: FAMILY MEDICINE CLINIC | Age: 80
End: 2025-03-17
Payer: MEDICARE

## 2025-03-17 VITALS
TEMPERATURE: 97.7 F | HEART RATE: 41 BPM | HEIGHT: 64 IN | SYSTOLIC BLOOD PRESSURE: 128 MMHG | BODY MASS INDEX: 40.29 KG/M2 | WEIGHT: 236 LBS | OXYGEN SATURATION: 96 % | DIASTOLIC BLOOD PRESSURE: 80 MMHG

## 2025-03-17 DIAGNOSIS — E79.0 HYPERURICEMIA: ICD-10-CM

## 2025-03-17 DIAGNOSIS — E66.813 OBESITY, CLASS 3: ICD-10-CM

## 2025-03-17 DIAGNOSIS — Z12.5 SCREENING PSA (PROSTATE SPECIFIC ANTIGEN): ICD-10-CM

## 2025-03-17 DIAGNOSIS — I10 ESSENTIAL HYPERTENSION: ICD-10-CM

## 2025-03-17 DIAGNOSIS — E79.0 HYPERURICEMIA: Primary | ICD-10-CM

## 2025-03-17 DIAGNOSIS — G47.33 OSA (OBSTRUCTIVE SLEEP APNEA): ICD-10-CM

## 2025-03-17 DIAGNOSIS — N18.30 STAGE 3 CHRONIC KIDNEY DISEASE, UNSPECIFIED WHETHER STAGE 3A OR 3B CKD (HCC): ICD-10-CM

## 2025-03-17 DIAGNOSIS — R73.03 PREDIABETES: ICD-10-CM

## 2025-03-17 LAB
ALBUMIN SERPL-MCNC: 4.2 G/DL (ref 3.5–4.6)
ALP SERPL-CCNC: 66 U/L (ref 35–104)
ALT SERPL-CCNC: 33 U/L (ref 0–41)
ANION GAP SERPL CALCULATED.3IONS-SCNC: 13 MEQ/L (ref 9–15)
AST SERPL-CCNC: 29 U/L (ref 0–40)
BILIRUB SERPL-MCNC: 0.4 MG/DL (ref 0.2–0.7)
BUN SERPL-MCNC: 27 MG/DL (ref 8–23)
CALCIUM SERPL-MCNC: 9.2 MG/DL (ref 8.5–9.9)
CHLORIDE SERPL-SCNC: 102 MEQ/L (ref 95–107)
CHOLEST SERPL-MCNC: 139 MG/DL (ref 0–199)
CO2 SERPL-SCNC: 25 MEQ/L (ref 20–31)
CREAT SERPL-MCNC: 1.22 MG/DL (ref 0.7–1.2)
ERYTHROCYTE [DISTWIDTH] IN BLOOD BY AUTOMATED COUNT: 13.1 % (ref 11.5–14.5)
GLOBULIN SER CALC-MCNC: 2.7 G/DL (ref 2.3–3.5)
GLUCOSE SERPL-MCNC: 141 MG/DL (ref 70–99)
HCT VFR BLD AUTO: 45.1 % (ref 42–52)
HDLC SERPL-MCNC: 40 MG/DL (ref 40–59)
HGB BLD-MCNC: 15.6 G/DL (ref 14–18)
LDLC SERPL CALC-MCNC: 45 MG/DL (ref 0–129)
MCH RBC QN AUTO: 31.9 PG (ref 27–31.3)
MCHC RBC AUTO-ENTMCNC: 34.6 % (ref 33–37)
MCV RBC AUTO: 92.2 FL (ref 79–92.2)
PLATELET # BLD AUTO: 171 K/UL (ref 130–400)
POTASSIUM SERPL-SCNC: 4.6 MEQ/L (ref 3.4–4.9)
PROT SERPL-MCNC: 6.9 G/DL (ref 6.3–8)
PSA SERPL-MCNC: 0.5 NG/ML (ref 0–4)
RBC # BLD AUTO: 4.89 M/UL (ref 4.7–6.1)
SODIUM SERPL-SCNC: 140 MEQ/L (ref 135–144)
TRIGL SERPL-MCNC: 269 MG/DL (ref 0–150)
URATE SERPL-MCNC: 6.6 MG/DL (ref 3.4–7)
WBC # BLD AUTO: 7.6 K/UL (ref 4.8–10.8)

## 2025-03-17 PROCEDURE — 1160F RVW MEDS BY RX/DR IN RCRD: CPT | Performed by: FAMILY MEDICINE

## 2025-03-17 PROCEDURE — G8427 DOCREV CUR MEDS BY ELIG CLIN: HCPCS | Performed by: FAMILY MEDICINE

## 2025-03-17 PROCEDURE — 99214 OFFICE O/P EST MOD 30 MIN: CPT | Performed by: FAMILY MEDICINE

## 2025-03-17 PROCEDURE — G8417 CALC BMI ABV UP PARAM F/U: HCPCS | Performed by: FAMILY MEDICINE

## 2025-03-17 PROCEDURE — 3079F DIAST BP 80-89 MM HG: CPT | Performed by: FAMILY MEDICINE

## 2025-03-17 PROCEDURE — 1123F ACP DISCUSS/DSCN MKR DOCD: CPT | Performed by: FAMILY MEDICINE

## 2025-03-17 PROCEDURE — 1036F TOBACCO NON-USER: CPT | Performed by: FAMILY MEDICINE

## 2025-03-17 PROCEDURE — 3074F SYST BP LT 130 MM HG: CPT | Performed by: FAMILY MEDICINE

## 2025-03-17 PROCEDURE — 1159F MED LIST DOCD IN RCRD: CPT | Performed by: FAMILY MEDICINE

## 2025-03-17 PROCEDURE — 1126F AMNT PAIN NOTED NONE PRSNT: CPT | Performed by: FAMILY MEDICINE

## 2025-03-17 SDOH — ECONOMIC STABILITY: FOOD INSECURITY: WITHIN THE PAST 12 MONTHS, THE FOOD YOU BOUGHT JUST DIDN'T LAST AND YOU DIDN'T HAVE MONEY TO GET MORE.: NEVER TRUE

## 2025-03-17 SDOH — ECONOMIC STABILITY: FOOD INSECURITY: WITHIN THE PAST 12 MONTHS, YOU WORRIED THAT YOUR FOOD WOULD RUN OUT BEFORE YOU GOT MONEY TO BUY MORE.: NEVER TRUE

## 2025-03-17 ASSESSMENT — PATIENT HEALTH QUESTIONNAIRE - PHQ9
SUM OF ALL RESPONSES TO PHQ QUESTIONS 1-9: 0
SUM OF ALL RESPONSES TO PHQ QUESTIONS 1-9: 0
1. LITTLE INTEREST OR PLEASURE IN DOING THINGS: NOT AT ALL
SUM OF ALL RESPONSES TO PHQ QUESTIONS 1-9: 0
2. FEELING DOWN, DEPRESSED OR HOPELESS: NOT AT ALL
SUM OF ALL RESPONSES TO PHQ QUESTIONS 1-9: 0

## 2025-03-17 NOTE — PROGRESS NOTES
exercise    Physical Exam:  /80 (BP Site: Left Upper Arm)   Pulse (!) 41   Temp 97.7 °F (36.5 °C)   Ht 1.626 m (5' 4\")   Wt 107 kg (236 lb)   SpO2 96%   BMI 40.51 kg/m²     Gen: Well, NAD, Alert, Oriented x 3   HEENT: EOMI, eyes clear, MMM  Skin: without rash or jaundice  Neck: no significant lymphadenopathy or thyromegaly  Lungs: CTA B w/out Rales/Wheezes/Rhonchi, Good respiratory effort   Heart: RRR, S1S2, w/out M/R/G, non-displaced PMI   Abdomen: Soft NT/ND, w/out R/G, w/ +BSx4 , Diastasis recti  Ext: trace BLE edema   Neuro: Neurovascularly intact w/ Sensory/Motor intact UE/LE Bilaterally.  Rectal - deferred - sees urology annually  Psych:generally euthymic    Lab Results   Component Value Date    WBC 7.3 03/15/2024    HGB 15.1 03/15/2024    HCT 45.4 03/15/2024     03/15/2024    CHOL 114 03/15/2024    TRIG 158 (H) 03/15/2024    HDL 39 (L) 03/15/2024    ALT 27 03/15/2024    AST 34 03/15/2024     03/15/2024    K 4.9 03/15/2024     03/15/2024    CREATININE 1.38 (H) 03/15/2024    BUN 22 03/15/2024    CO2 26 03/15/2024    TSH 1.800 01/14/2015    PSA 0.45 03/15/2024    LABA1C 6.5 (H) 09/16/2024           A&P   Diagnosis Orders   1. Hyperuricemia  Uric Acid      2. Stage 3 chronic kidney disease, unspecified whether stage 3a or 3b CKD (HCC)  Lipid Panel    CBC    Comprehensive Metabolic Panel      3. Prediabetes  Hemoglobin A1C      4. Essential hypertension        5. JOSE (obstructive sleep apnea)        6. Screening PSA (prostate specific antigen)  PSA Screening      7. Obesity, class 3 (E66.813)        8. Body mass index [BMI] 40.0-44.9, adult (Z68.41)            Refills given     Continue same regimen     Cardiology follow up ongoing    Note reviewed    Reviewed all chronic issues     Chronic issues are generally well-controlled.                   Berto Santizo MD

## 2025-03-18 ENCOUNTER — RESULTS FOLLOW-UP (OUTPATIENT)
Dept: FAMILY MEDICINE CLINIC | Age: 80
End: 2025-03-18

## 2025-03-18 LAB
ESTIMATED AVERAGE GLUCOSE: 154 MG/DL
HBA1C MFR BLD: 7 % (ref 4–6)

## 2025-03-27 ENCOUNTER — OFFICE VISIT (OUTPATIENT)
Dept: CARDIOLOGY CLINIC | Age: 80
End: 2025-03-27
Payer: MEDICARE

## 2025-03-27 VITALS
WEIGHT: 236 LBS | BODY MASS INDEX: 40.51 KG/M2 | HEART RATE: 76 BPM | DIASTOLIC BLOOD PRESSURE: 68 MMHG | SYSTOLIC BLOOD PRESSURE: 132 MMHG | OXYGEN SATURATION: 97 %

## 2025-03-27 DIAGNOSIS — E78.5 HYPERLIPIDEMIA, UNSPECIFIED HYPERLIPIDEMIA TYPE: ICD-10-CM

## 2025-03-27 DIAGNOSIS — I10 HYPERTENSION, UNSPECIFIED TYPE: ICD-10-CM

## 2025-03-27 DIAGNOSIS — I47.10 PSVT (PAROXYSMAL SUPRAVENTRICULAR TACHYCARDIA): Primary | ICD-10-CM

## 2025-03-27 DIAGNOSIS — R00.2 PALPITATIONS: ICD-10-CM

## 2025-03-27 DIAGNOSIS — R06.09 DOE (DYSPNEA ON EXERTION): ICD-10-CM

## 2025-03-27 PROCEDURE — 3075F SYST BP GE 130 - 139MM HG: CPT | Performed by: INTERNAL MEDICINE

## 2025-03-27 PROCEDURE — 1126F AMNT PAIN NOTED NONE PRSNT: CPT | Performed by: INTERNAL MEDICINE

## 2025-03-27 PROCEDURE — 1036F TOBACCO NON-USER: CPT | Performed by: INTERNAL MEDICINE

## 2025-03-27 PROCEDURE — G8417 CALC BMI ABV UP PARAM F/U: HCPCS | Performed by: INTERNAL MEDICINE

## 2025-03-27 PROCEDURE — 93000 ELECTROCARDIOGRAM COMPLETE: CPT | Performed by: INTERNAL MEDICINE

## 2025-03-27 PROCEDURE — G8427 DOCREV CUR MEDS BY ELIG CLIN: HCPCS | Performed by: INTERNAL MEDICINE

## 2025-03-27 PROCEDURE — 1123F ACP DISCUSS/DSCN MKR DOCD: CPT | Performed by: INTERNAL MEDICINE

## 2025-03-27 PROCEDURE — 1159F MED LIST DOCD IN RCRD: CPT | Performed by: INTERNAL MEDICINE

## 2025-03-27 PROCEDURE — 99214 OFFICE O/P EST MOD 30 MIN: CPT | Performed by: INTERNAL MEDICINE

## 2025-03-27 PROCEDURE — 3078F DIAST BP <80 MM HG: CPT | Performed by: INTERNAL MEDICINE

## 2025-03-27 NOTE — PROGRESS NOTES
him.  Currently, he reports intermittent palpitations over the last couple weeks.  He says he has also noticed several episodes where his heart rate runs \"in the 30s\" when he checks his pulse oximeter.  The episodes typically occur while he is sitting but resolve when he is active.  He denies any chest pain.  No near-syncope or syncope.  He reports compliance with medications.      Current medications:   Current Outpatient Medications   Medication Sig Dispense Refill    finasteride (PROSCAR) 5 MG tablet TAKE 1 TABLET BY MOUTH ONCE DAILY 90 tablet 3    terazosin (HYTRIN) 10 MG capsule TAKE 1 CAPSULE BY MOUTH NIGHTLY 90 capsule 3    allopurinol (ZYLOPRIM) 100 MG tablet TAKE 1 TABLET BY MOUTH ONCE DAILY 30 tablet 3    ezetimibe-simvastatin (VYTORIN) 10-20 MG per tablet TAKE 1 TABLET BY MOUTH NIGHTLY 30 tablet 5    metoprolol tartrate (LOPRESSOR) 50 MG tablet TAKE 1 TABLET BY MOUTH TWICE DAILY 180 tablet 2    hydroCHLOROthiazide (HYDRODIURIL) 25 MG tablet TAKE 1 TABLET BY MOUTH DAILY 90 tablet 2    Multiple Vitamins-Minerals (MULTI COMPLETE) CAPS Take by mouth      omeprazole (PRILOSEC) 10 MG delayed release capsule Take 1 capsule by mouth Takes twice a week      ibuprofen (ADVIL;MOTRIN) 200 MG tablet Take 1 tablet by mouth every 6 hours as needed for Pain      docusate sodium (COLACE) 100 MG capsule Take 1 capsule by mouth 2 times daily      Coenzyme Q10 (CO Q 10 PO) Take  by mouth.      aspirin 325 MG tablet Take 1 tablet by mouth daily       No current facility-administered medications for this visit.       Allergies: Patient has no known allergies.      Review of Systems:  General: Fatigued at times.  Cardiovascular: See HPI. No orthopnea or PND.   Respiratory: Dyspnea is present with moderate degrees of activity.+ JOSE  Gastrointestinal: No melena or hematochezia.   Genitourinary: No hematuria.  + CKD  Hematological: No easy bruising or bleeding.   Vascular: No lower extremity edema or claudication.  Neurological:

## 2025-04-15 ENCOUNTER — HOSPITAL ENCOUNTER (OUTPATIENT)
Age: 80
Discharge: HOME | End: 2025-04-17
Payer: MEDICARE

## 2025-04-15 VITALS
DIASTOLIC BLOOD PRESSURE: 73 MMHG | WEIGHT: 234 LBS | BODY MASS INDEX: 39.95 KG/M2 | SYSTOLIC BLOOD PRESSURE: 162 MMHG | HEIGHT: 64 IN

## 2025-04-15 DIAGNOSIS — R06.09 DOE (DYSPNEA ON EXERTION): ICD-10-CM

## 2025-04-15 DIAGNOSIS — I47.10 PAROXYSMAL SUPRAVENTRICULAR TACHYCARDIA: Primary | ICD-10-CM

## 2025-04-15 DIAGNOSIS — I47.10 PSVT (PAROXYSMAL SUPRAVENTRICULAR TACHYCARDIA): ICD-10-CM

## 2025-04-15 LAB
ECHO AR MAX VEL PISA: 3.7 M/S
ECHO AV AREA PEAK VELOCITY: 2.1 CM2
ECHO AV AREA PEAK VELOCITY: 2.1 CM2
ECHO AV AREA PEAK VELOCITY: 2.2 CM2
ECHO AV AREA PEAK VELOCITY: 2.2 CM2
ECHO AV AREA VTI: 2.4 CM2
ECHO AV AREA/BSA VTI: 1.1 CM2/M2
ECHO AV CUSP MM: 1.8 CM
ECHO AV MEAN GRADIENT: 4 MMHG
ECHO AV MEAN VELOCITY: 1 M/S
ECHO AV PEAK GRADIENT: 10 MMHG
ECHO AV PEAK GRADIENT: 9 MMHG
ECHO AV PEAK VELOCITY: 1.5 M/S
ECHO AV PEAK VELOCITY: 1.6 M/S
ECHO AV REGURGITANT PHT: 560.8 MS
ECHO AV VTI: 34.2 CM
ECHO BSA: 2.19 M2
ECHO EST RA PRESSURE: 10 MMHG
ECHO LA VOL A-L A2C: 42 ML (ref 18–58)
ECHO LA VOL A-L A4C: 52 ML (ref 18–58)
ECHO LA VOL MOD A2C: 39 ML (ref 18–58)
ECHO LA VOL MOD A4C: 49 ML (ref 18–58)
ECHO LA VOLUME AREA LENGTH: 48 ML
ECHO LA VOLUME INDEX A-L A2C: 20 ML/M2 (ref 16–34)
ECHO LA VOLUME INDEX A-L A4C: 25 ML/M2 (ref 16–34)
ECHO LA VOLUME INDEX AREA LENGTH: 23 ML/M2 (ref 16–34)
ECHO LA VOLUME INDEX MOD A2C: 19 ML/M2 (ref 16–34)
ECHO LA VOLUME INDEX MOD A4C: 23 ML/M2 (ref 16–34)
ECHO LV E' LATERAL VELOCITY: 9.46 CM/S
ECHO LV E' SEPTAL VELOCITY: 8.5 CM/S
ECHO LV EDV A2C: 98 ML
ECHO LV EDV A4C: 65 ML
ECHO LV EDV BP: 82 ML (ref 67–155)
ECHO LV EDV INDEX A4C: 31 ML/M2
ECHO LV EDV INDEX BP: 39 ML/M2
ECHO LV EDV NDEX A2C: 47 ML/M2
ECHO LV EF PHYSICIAN: 60 %
ECHO LV EJECTION FRACTION A2C: 77 %
ECHO LV EJECTION FRACTION A4C: 53 %
ECHO LV EJECTION FRACTION BIPLANE: 67 % (ref 55–100)
ECHO LV ESV A2C: 23 ML
ECHO LV ESV A4C: 31 ML
ECHO LV ESV BP: 27 ML (ref 22–58)
ECHO LV ESV INDEX A2C: 11 ML/M2
ECHO LV ESV INDEX A4C: 15 ML/M2
ECHO LV ESV INDEX BP: 13 ML/M2
ECHO LV FRACTIONAL SHORTENING: 28 % (ref 28–44)
ECHO LV INTERNAL DIMENSION DIASTOLE INDEX: 2.39 CM/M2
ECHO LV INTERNAL DIMENSION DIASTOLIC: 5 CM (ref 4.2–5.9)
ECHO LV INTERNAL DIMENSION SYSTOLIC INDEX: 1.72 CM/M2
ECHO LV INTERNAL DIMENSION SYSTOLIC: 3.6 CM
ECHO LV IVSD: 1.1 CM (ref 0.6–1)
ECHO LV IVSS: 1.2 CM
ECHO LV MASS 2D: 220.3 G (ref 88–224)
ECHO LV MASS INDEX 2D: 105.4 G/M2 (ref 49–115)
ECHO LV POSTERIOR WALL DIASTOLIC: 1.2 CM (ref 0.6–1)
ECHO LV POSTERIOR WALL SYSTOLIC: 1.3 CM
ECHO LV RELATIVE WALL THICKNESS RATIO: 0.48
ECHO LVOT AREA: 3.1 CM2
ECHO LVOT AV VTI INDEX: 0.77
ECHO LVOT DIAM: 2 CM
ECHO LVOT MEAN GRADIENT: 2 MMHG
ECHO LVOT PEAK GRADIENT: 4 MMHG
ECHO LVOT PEAK GRADIENT: 5 MMHG
ECHO LVOT PEAK VELOCITY: 1 M/S
ECHO LVOT PEAK VELOCITY: 1.1 M/S
ECHO LVOT STROKE VOLUME INDEX: 39.4 ML/M2
ECHO LVOT SV: 82.3 ML
ECHO LVOT VTI: 26.2 CM
ECHO MV A VELOCITY: 0.74 M/S
ECHO MV AREA PHT: 2.6 CM2
ECHO MV E DECELERATION TIME (DT): 231.6 MS
ECHO MV E VELOCITY: 1.04 M/S
ECHO MV E/A RATIO: 1.41
ECHO MV E/E' LATERAL: 10.99
ECHO MV E/E' RATIO (AVERAGED): 11.61
ECHO MV E/E' SEPTAL: 12.24
ECHO MV PRESSURE HALF TIME (PHT): 84.2 MS
ECHO MV REGURGITANT PEAK GRADIENT: 100 MMHG
ECHO MV REGURGITANT PEAK VELOCITY: 5 M/S
ECHO PV MAX VELOCITY: 1.6 M/S
ECHO PV PEAK GRADIENT: 10 MMHG
ECHO RIGHT VENTRICULAR SYSTOLIC PRESSURE (RVSP): 32 MMHG
ECHO RV INTERNAL DIMENSION: 1.6 CM
ECHO RV TAPSE: 2.2 CM (ref 1.7–?)
ECHO RVOT PEAK GRADIENT: 2 MMHG
ECHO RVOT PEAK VELOCITY: 0.8 M/S
ECHO TV REGURGITANT MAX VELOCITY: 2.33 M/S
ECHO TV REGURGITANT PEAK GRADIENT: 22 MMHG

## 2025-04-15 PROCEDURE — 93306 TTE W/DOPPLER COMPLETE: CPT

## 2025-05-05 NOTE — TELEPHONE ENCOUNTER
Comments:     Last Office Visit (last PCP visit):   3/17/2025    Next Visit Date:  Future Appointments   Date Time Provider Department Center   5/8/2025 12:40 PM Jeronimo Martinez DO [facility]   9/17/2025  9:30 AM Berto Santizo MD [facility]       **If hasn't been seen in over a year OR hasn't followed up according to last diabetes/ADHD visit, make appointment for patient before sending refill to provider.    Rx requested:  Requested Prescriptions     Pending Prescriptions Disp Refills    ezetimibe-simvastatin (VYTORIN) 10-20 MG per tablet [Pharmacy Med Name: ezetimibe 10 mg-simvastatin 20 mg tablet] 30 tablet 5     Sig: TAKE 1 TABLET BY MOUTH NIGHTLY    metoprolol tartrate (LOPRESSOR) 50 MG tablet [Pharmacy Med Name: metoprolol tartrate 50 mg tablet] 180 tablet 5     Sig: TAKE 1 TABLET BY MOUTH TWICE DAILY    hydroCHLOROthiazide (HYDRODIURIL) 25 MG tablet [Pharmacy Med Name: hydrochlorothiazide 25 mg tablet] 90 tablet 5     Sig: TAKE 1 TABLET BY MOUTH DAILY

## 2025-05-06 RX ORDER — EZETIMIBE AND SIMVASTATIN 10; 20 MG/1; MG/1
1 TABLET ORAL NIGHTLY
Qty: 30 TABLET | Refills: 5 | Status: SHIPPED | OUTPATIENT
Start: 2025-05-06

## 2025-05-06 RX ORDER — HYDROCHLOROTHIAZIDE 25 MG/1
25 TABLET ORAL DAILY
Qty: 90 TABLET | Refills: 5 | Status: SHIPPED | OUTPATIENT
Start: 2025-05-06

## 2025-05-06 RX ORDER — METOPROLOL TARTRATE 50 MG
50 TABLET ORAL 2 TIMES DAILY
Qty: 180 TABLET | Refills: 5 | Status: SHIPPED | OUTPATIENT
Start: 2025-05-06 | End: 2025-05-08 | Stop reason: ALTCHOICE

## 2025-05-08 ENCOUNTER — OFFICE VISIT (OUTPATIENT)
Age: 80
End: 2025-05-08
Payer: MEDICARE

## 2025-05-08 VITALS
BODY MASS INDEX: 41.13 KG/M2 | WEIGHT: 239.6 LBS | SYSTOLIC BLOOD PRESSURE: 136 MMHG | OXYGEN SATURATION: 95 % | HEART RATE: 51 BPM | DIASTOLIC BLOOD PRESSURE: 74 MMHG

## 2025-05-08 DIAGNOSIS — E78.5 HYPERLIPIDEMIA, UNSPECIFIED HYPERLIPIDEMIA TYPE: ICD-10-CM

## 2025-05-08 DIAGNOSIS — I47.10 PSVT (PAROXYSMAL SUPRAVENTRICULAR TACHYCARDIA): Primary | ICD-10-CM

## 2025-05-08 PROCEDURE — 1123F ACP DISCUSS/DSCN MKR DOCD: CPT | Performed by: INTERNAL MEDICINE

## 2025-05-08 PROCEDURE — 1159F MED LIST DOCD IN RCRD: CPT | Performed by: INTERNAL MEDICINE

## 2025-05-08 PROCEDURE — 99213 OFFICE O/P EST LOW 20 MIN: CPT | Performed by: INTERNAL MEDICINE

## 2025-05-08 PROCEDURE — 3078F DIAST BP <80 MM HG: CPT | Performed by: INTERNAL MEDICINE

## 2025-05-08 PROCEDURE — G8427 DOCREV CUR MEDS BY ELIG CLIN: HCPCS | Performed by: INTERNAL MEDICINE

## 2025-05-08 PROCEDURE — G8417 CALC BMI ABV UP PARAM F/U: HCPCS | Performed by: INTERNAL MEDICINE

## 2025-05-08 PROCEDURE — 3075F SYST BP GE 130 - 139MM HG: CPT | Performed by: INTERNAL MEDICINE

## 2025-05-08 PROCEDURE — 99214 OFFICE O/P EST MOD 30 MIN: CPT | Performed by: INTERNAL MEDICINE

## 2025-05-08 PROCEDURE — 1036F TOBACCO NON-USER: CPT | Performed by: INTERNAL MEDICINE

## 2025-05-08 PROCEDURE — 1126F AMNT PAIN NOTED NONE PRSNT: CPT | Performed by: INTERNAL MEDICINE

## 2025-05-08 RX ORDER — METOPROLOL TARTRATE 75 MG/1
75 TABLET ORAL 2 TIMES DAILY
Qty: 180 TABLET | Refills: 3 | Status: SHIPPED | OUTPATIENT
Start: 2025-05-08

## 2025-05-08 NOTE — PROGRESS NOTES
2025    Berto Santizo MD  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  He was previously following with Dr. Barclay.  Currently, reports intermittent palpitations that come and go. Episodes are described as \"racing\" and skipping that last a couple of minutes before gradually resolving. He reports associated shortness of breath but no near syncope or syncope. He denies any chest pain or limiting exertional dyspnea with activity like 30 minutes walking on a treadmill at 2.5 miles per hour 2-3x/week. He does get dizzy with position changes. He denies any prior history of atrial fibrillation.     2023: Patient here for cardiac clearance.  He is tentatively scheduled for right knee replacement surgery on 2023.  He has been less active due to ongoing right knee pain.  He denies any chest pain with his daily activities.  He has dyspnea with more physical degrees of activity as well as worsening right knee pain.  He was previously having more palpitations but it has significantly improved as of late.  Recent event monitor showed runs of PSVT but no A-fib.  No near-syncope or syncope.  Reports compliance with medications.    3/21/2024: Patient here for follow-up of symptomatic PACs and PSVT.  Currently, he reports feeling reasonably well.  He denies any worsening of palpitations.  He says \"they are about the same\".  He remains active exercising on a treadmill at 2.5 mph 3 days a week without any chest pain or limiting exertional dyspnea.  No near-syncope or syncope.  He reports compliance with medications most of the time.    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  His ECG today shows frequent bigeminal PVCs which is new for

## 2025-05-08 NOTE — PATIENT INSTRUCTIONS
Increase metoprolol to 75 mg twice a day.   Continue other medications  Follow up in 6 months, sooner if needed.

## 2025-07-01 RX ORDER — ALLOPURINOL 100 MG/1
100 TABLET ORAL DAILY
Qty: 30 TABLET | Refills: 5 | Status: SHIPPED | OUTPATIENT
Start: 2025-07-01

## 2025-07-01 NOTE — TELEPHONE ENCOUNTER
Comments:     Last Office Visit (last PCP visit):   3/17/2025    Next Visit Date:  Future Appointments   Date Time Provider Department Center   9/17/2025  9:30 AM Berto Santizo MD [facility]   11/6/2025 11:00 AM Jeronimo Martinez DO [facility]       **If hasn't been seen in over a year OR hasn't followed up according to last diabetes/ADHD visit, make appointment for patient before sending refill to provider.    Rx requested:  Requested Prescriptions     Pending Prescriptions Disp Refills    allopurinol (ZYLOPRIM) 100 MG tablet [Pharmacy Med Name: allopurinol 100 mg tablet] 30 tablet 3     Sig: TAKE 1 TABLET BY MOUTH ONCE DAILY

## (undated) DEVICE — SINGLE PORT MANIFOLD: Brand: NEPTUNE 2

## (undated) DEVICE — Device: Brand: ENDO SMARTCAP

## (undated) DEVICE — TRAP POLYP BALEEN

## (undated) DEVICE — SNARE ENDOSCP AD L240CM LOOP W10MM SHTH DIA2.4MM RND INSUL

## (undated) DEVICE — ENDO CARRY-ON PROCEDURE KIT: Brand: ENDO CARRY-ON PROCEDURE KIT

## (undated) DEVICE — BRUSH ENDO CLN L90.5IN SHTH DIA1.7MM BRIST DIA5-7MM 2-6MM

## (undated) DEVICE — TUBE SET 96 MM 64 MM H2O PERISTALTIC STD AUX CHANNEL

## (undated) DEVICE — TUBING, SUCTION, 1/4" X 10', STRAIGHT: Brand: MEDLINE